# Patient Record
Sex: MALE | Race: BLACK OR AFRICAN AMERICAN | NOT HISPANIC OR LATINO | Employment: FULL TIME | URBAN - METROPOLITAN AREA
[De-identification: names, ages, dates, MRNs, and addresses within clinical notes are randomized per-mention and may not be internally consistent; named-entity substitution may affect disease eponyms.]

---

## 2017-01-09 ENCOUNTER — TELEPHONE (OUTPATIENT)
Dept: NEUROLOGY | Facility: CLINIC | Age: 42
End: 2017-01-09

## 2017-01-09 RX ORDER — CLOBAZAM 20 MG/1
TABLET ORAL
Qty: 360 TABLET | Refills: 0 | Status: SHIPPED | OUTPATIENT
Start: 2017-01-09 | End: 2017-01-12 | Stop reason: SDUPTHER

## 2017-01-09 RX ORDER — LACOSAMIDE 100 MG/1
TABLET, FILM COATED ORAL
Qty: 360 TABLET | Refills: 0 | Status: SHIPPED | OUTPATIENT
Start: 2017-01-09 | End: 2017-01-23 | Stop reason: SDUPTHER

## 2017-01-09 NOTE — TELEPHONE ENCOUNTER
----- Message from Yair Hernandez sent at 1/9/2017  9:33 AM CST -----  Contact: Self [phone]  Patient is calling to r/s the 1-24 appt, imani call

## 2017-01-13 RX ORDER — CLOBAZAM 20 MG/1
20 TABLET ORAL 2 TIMES DAILY
Qty: 360 TABLET | Refills: 0 | Status: SHIPPED | OUTPATIENT
Start: 2017-01-13 | End: 2017-01-13 | Stop reason: SDUPTHER

## 2017-01-13 NOTE — TELEPHONE ENCOUNTER
Patient is requesting a refill of his Fycompa be sent to [facility] and requests a 90 day supply.

## 2017-01-13 NOTE — TELEPHONE ENCOUNTER
----- Message from Alexandria Marley MA sent at 1/13/2017 10:31 AM CST -----  Contact: Pt [phone]      ----- Message -----     From: Jaida Andres     Sent: 1/13/2017  10:29 AM       To: Bridgett Lombardi Staff    Pt is calling to get a refill on his perampanel (FYCOMPA) 2 mg Tab and cloBAZam (ONFI) 20 mg Tab 360 tablet 0     [facility] ([facility] - [place], [place] - [address]   [address] [facility] [address]  Phone: [phone] Fax: [phone]    [facility] - [place] - [address]  [address]  [address]  [place] [address]  Phone: [phone] Fax: [phone]

## 2017-01-18 ENCOUNTER — TELEPHONE (OUTPATIENT)
Dept: NEUROLOGY | Facility: CLINIC | Age: 42
End: 2017-01-18

## 2017-01-18 RX ORDER — CLOBAZAM 20 MG/1
20 TABLET ORAL 2 TIMES DAILY
Qty: 360 TABLET | Refills: 0 | Status: SHIPPED | OUTPATIENT
Start: 2017-01-18 | End: 2017-01-23 | Stop reason: SDUPTHER

## 2017-01-23 ENCOUNTER — OFFICE VISIT (OUTPATIENT)
Dept: NEUROLOGY | Facility: CLINIC | Age: 42
End: 2017-01-23
Payer: COMMERCIAL

## 2017-01-23 VITALS
SYSTOLIC BLOOD PRESSURE: 122 MMHG | BODY MASS INDEX: 28.29 KG/M2 | HEART RATE: 84 BPM | WEIGHT: 202.81 LBS | DIASTOLIC BLOOD PRESSURE: 76 MMHG

## 2017-01-23 DIAGNOSIS — G40.219 LOCALIZATION-RELATED SYMPTOMATIC EPILEPSY AND EPILEPTIC SYNDROMES WITH COMPLEX PARTIAL SEIZURES, INTRACTABLE, WITHOUT STATUS EPILEPTICUS: ICD-10-CM

## 2017-01-23 PROCEDURE — 99999 PR PBB SHADOW E&M-EST. PATIENT-LVL II: CPT | Mod: PBBFAC,,, | Performed by: PSYCHIATRY & NEUROLOGY

## 2017-01-23 PROCEDURE — 99214 OFFICE O/P EST MOD 30 MIN: CPT | Mod: S$GLB,,, | Performed by: PSYCHIATRY & NEUROLOGY

## 2017-01-23 PROCEDURE — 1159F MED LIST DOCD IN RCRD: CPT | Mod: S$GLB,,, | Performed by: PSYCHIATRY & NEUROLOGY

## 2017-01-23 RX ORDER — LACOSAMIDE 100 MG/1
200 TABLET ORAL 2 TIMES DAILY
Qty: 360 TABLET | Refills: 1 | Status: SHIPPED | OUTPATIENT
Start: 2017-01-23 | End: 2017-03-06 | Stop reason: SDUPTHER

## 2017-01-23 RX ORDER — CLOBAZAM 20 MG/1
40 TABLET ORAL 2 TIMES DAILY
Qty: 360 TABLET | Refills: 1 | Status: SHIPPED | OUTPATIENT
Start: 2017-01-23 | End: 2017-02-22 | Stop reason: SDUPTHER

## 2017-01-23 RX ORDER — LAMOTRIGINE 200 MG/1
600 TABLET ORAL 2 TIMES DAILY
Qty: 540 TABLET | Refills: 1 | Status: SHIPPED | OUTPATIENT
Start: 2017-01-23 | End: 2017-02-21 | Stop reason: SDUPTHER

## 2017-01-23 NOTE — PROGRESS NOTES
Name: Georgette Nagel  MRN: [id number]   CSN: [id number]      Date: 01/23/2017    HISTORY OF PRESENT ILLNESS (HPI)  The patient is a 41 y.o. yo [facility]       2016/04/19   Since the last visit the patient has experienced one seizures (12 Jan 2017) .  Meds were increased last time and he is tolerating the new dosages well.  There was no obvious provocative reason.  He has a good strategy for compliance.  He had no concurrent medical problem and had not taken benadryl or other proconvulsive medication.      Results for GEORGETTE NAGEL (MRN [id number]) as of 1/23/2017 14:26   Ref. Range 4/1/2015 13:20 8/5/2015 14:05 8/21/2015 11:40   Lamotrigine Lvl Latest Ref Range: 2.0 - 15.0 ug/mL 13.4  17.3 (H)   MRI PREVIOUS Unknown  Rpt    Lacosamide Latest Ref Range: 1.0 - 10.0 mcg/mL 8.0         Current History:  pt has had the following episodes:  2015/4/8 - Pt had just finished a walk.  Eye blinking and stereotypic hand movements.  No generalized convulsions were noted. Didn't lose consciousness.  Lasted 30s to 1 minute.  2015/6/6 - Pt was in his room.  Came to his dad to give him his phone to record what was happening.  Said he has the feeling that he's about to have a seizure.  Says there is no aura per se.  Says a thought that he's about to have a seizure pops into his head, which usually precedes his events.  Said his speech got markedly worse during the event.  Eye blinking lasted 30s to 1 minute and resolved spontaneously.  Also had stereotypic hand movements and eye blinking.  Maintained consciousness throughout episode.  Pt had no memory of the seizure, which was relayed by his dad.    2015/6/8 - Pt was running around a track.  Said he overexerted himself over the last three laps.  Said he had another event immediately after exercise.  Again, this episode was brief (30s-1min) and was characterized by walking in circles, eye blinking, and stereotypic hand motions of rubbing his fingers. Had no intraictal recall.         Previous History (2015/4/1)   The patient was referred for consultation by Dr. Guerrero due to want to transfer care here due to long distance, patient is living [facility].  The patient was accompanied by his mother and father who provided some of the history.      Seizure surgery 2008 for epilepsy. Since then, less frequent and not as strong, still have seizure twice a month with current medication dosage. He is compliant with medication, tolerates medication well, no side effect noticed. On current regime for about 5 years, change to onfi 2-3 years ago in place of keppra.       Epilepsy History  ED visits: last year, feel someone talking to him  Episodes of SE  Change in meds    Seizure Seminology  Seizure Type 1  Classification:   Aura - feel it is coming, his mind told him that seizure is coming  Ictus  - Nonconv -  - Conv - whole body jerking more on the right side, after a couple of jerk, he is out, first started has tongue biting and incontinence  - Duration - 2 minutes  Post-ictal  - Symptoms: can not talk, sleepiness no headache  - Duration: 5-10 minutes  Age of onset : 6 year old  Current Seizure Frequency -  Initially once week,  Last Seizure: 10 years ago    Seizure Type 2: after seizure  Classification:   Aura - feel it is coming, but not as strong as before  Ictus  - Nonconv -  - Conv - blinking and body jerk more on the arm.   - Duration - 30 sec  Post-ictal  - Symptoms: no sleepiness  - Duration: back to baseline after ictus right away  Age of onset : 29  Current Seizure Frequency -  Last Seizure: a couple weeks ago    sz per month 2012 2013 2014 2015 2016 2017   Doug         Feb         Mar         Apr    1     May         Deny    2     Jul     1    Aug         Sep     1    Oct         Nov         Dec         Tot           Seizure Triggers:   Sleep Deprivation - None  Other medicaitons - None  Psych/stress - excitement can trigger jorge athletic event, when feel frustrated  Photic stimulation -  None  Hyperventilation - None  Medical Problems - None  Sensory Stimulation (light, sound, etc) - None  Missed dose of meds - Denies missing meds since last visit though has missed meds in the past which were closely followed by seizures.   Computer use may trigger- sometimes  Exercise/overexertion  may trigger- sometimes    AED Treatments  Present regimen  clobezam (Onfi or Frizium, CLB):  40 mg bid  lacosamide (Vimpat, LCS) :  200 mg bid  lamotrigine (Lamictal, LTG)   600 mg bid  Results for GEORGETTE MONCADA (MRN [id number]) as of 12/16/2015 14:24   Ref. Range 4/1/2015 13:20 8/5/2015 14:05 8/21/2015 11:40   Lamotrigine Lvl Latest Range: 2.0-15.0 ug/mL 13.4  17.3 (H)     Ativan PRN    Prior treatments  carbamazepine (Tegretol, CBZ):  tried in the past  ethosuximide (Zarontin, ESM):  tried in the past  felbamate (felbatol, FBM):   tried in the past  gabapentin (Neurontin, GPN):  tried in the past  levetiracetam (Keppra, LEV):   tried in the past  phenytoin (Dilantin, PHT):   tried in the past  primidone (Mysoline, PRM):   tried in the past  topiramate (Topamax, TPM):   tried in the past  valproic acid (Depakote, VPA):  tried in the past   clorazepate (Tranxene, CLZ):   Stopped previous visits.     Not tried  acetazolamide (Diamox, AZM)  amantadine  eslicarbazine (Aptiom, ESL)  methsuximide (Celontin, MSM)  methyphenytoin (Mesantion, MHT)  oxcarbazepine (Trileptal OXC)  perampanel (Fycompa, FCP)   phenobarbital (Pb)  pregabalin (Lyrica, PGB)  retigabine (Potiga, RTG)  rufinamide (Banzel, RUF)  tiagabine (Gabatril,  TGB)  viagabatrin, (Sabril, VGB)  vagal nerve stimulator (VNS)  zonisamide (Zonegran, ZNA)  Benzodiazepines  diazepam - rectal (Diastatl)  diazepam - oral (Valium, DZ)  clonazepam (Klonopin, CZP)  Brain Stimulation  Vagal Nerve Stimulation-n/a  DBS- n/a    Compliance method  Memory - yes  Mom or Spouse - Yes  Pill Box - no  Henrique calendar - no  Turn over medication bottle - no  Phone alarm - no    Seizure  Evaluation  EEG Routine -   EEG Ambulatory -   EEG\Video Monitoring - 2014  MRI/MRA - 2015  CT/CTA Scan -   PET Scan -   Neuropsychological evaluation -   DEXA Scan  Had continuous EEG in 2014 no find anything abnormal.  MRI brain 2015: no new changes.     Potential Epilepsy Risk Factors:   Pregnancy/Labor/Delivery - full term uncomplicated pregnancy labor and vaginal delivery  Febrile seizures - none  Head injury  - none  CNS infection - none     Stroke - none  Family Hx of Sz - none    PAST MEDICAL HISTORY:   Epilepsy     PAST SURGICAL HISTORY: epilepsy surgery 2008    FAMILY HISTORY:   Family History   Problem Relation Age of Onset    Diabetes Father     Cancer Father     Diabetes Sister     Cancer Paternal Aunt     Diabetes Paternal Uncle     Cancer Paternal Grandmother     Diabetes Paternal Grandfather     Cancer Paternal Grandfather            SOCIAL HISTORY:   Social History     Social History    Marital status: Single     Spouse name: N/A    Number of children: N/A    Years of education: N/A     Occupational History    Not on file.     Social History Main Topics    Smoking status: Never Smoker    Smokeless tobacco: Not on file    Alcohol use No    Drug use: No    Sexual activity: Not on file     Other Topics Concern    Not on file     Social History Narrative        SUBSTANCE USE:  Social History     Social History Main Topics    Smoking status: Never Smoker    Smokeless tobacco: Not on file    Alcohol use No    Drug use: No    Sexual activity: Not on file      Social History   Substance Use Topics    Smoking status: Never Smoker    Smokeless tobacco: Not on file    Alcohol use No        ALLERGIES: Review of patient's allergies indicates no known allergies.     Visit Vitals    /76    Pulse 84    Wt 92 kg (202 lb 13.2 oz)    BMI 28.29 kg/m2       Higher Cortical Function:    Patient is a well developed, pleasant, well groomed individual appearing their stated age  Oriented  "- intact to person, place and time and followed two step instruction correctly.    Spell WORLD - Patients response: forward - WORLD; backwards -   Subtraction of serial 7s from 100 - 3 steps performed correct  Memory - Patient recalled 3 of 3 objects after 5 minutes  Fund of knowledge was appropriate.    R-L Orientation - Intact - Impaired  Language - Speech showed impaired verbal fluency and expression.  Some perseveration of speech.  Agnosias, agraphesthesia, or astereognosis - not present.   Extinction with double simultaneous stimulation:        Proximal-distal stimulation - Not present        Right-left stimulation - Not present  Cranial Nerves II - XII:    EOMs were intact with normal smooth and a mild end gaze nystagmus.    PERRLA. D/C   Funduscopic exam - disc were flat with normal A/V ratio and no exudates or hemorrhages. Visual fields were full to confrontation.    Motor - facial movement was symmetrical and normal.    Facial sensory - Light touch and pin prick sensations were normal.    Hearing was normal to finger rub.  Palate moved well and was symmetrical with normal palatal and oral sensation.    Tongue movement was full & the patient could say "la la la" and "Ka Ka Ka" without  difficulty. Patient repeated Congregation and Pentecostal without difficulty. Normal power and bulk was found in the massiter and rotator muscles of the neck.  Motor: Power, bulk and tone were normal in all extremities.  Sensory: Light touch, pin prick, vibration and position senses were normal in all extremities.    Coordination:       Rapid alternating movements and rapid finger tapping - normal.       Finger to nose - nl.       Arm roll - symmetrical.    Gait:  Station, gait and tandem walking were done without difficulty and Romberg was negative.  Frontal release signs  Sign Left Right   Glabellar Mild    Snout absent absent   Root absent absent   Suck absent absent   Palmomental absent absent          Deep tendon reflexes:  "   Reflex L R   Bicpets 2+ 2+   Tricepts 2+ 2+   Brachio-radialis 2+ 2+   Knee 2+ 2+   Ankle 2+ 2+   Babinski No No     Tremor: resting, postural, intentional - none    Pulses    Carotids - strong without bruits    Peripheral - strong and symmetrical      IMPRESSION  1. Complex partial  2. GTC  3. Post Left frontal lobe resection  4. He has infrequent seizures and improved with last dosage change    DISPOSITION:   Obtain records from Dr Rawls at [facility]  2, Continue clobazam 80 mg per day   3. Continue lacosamide (Vimpat, LCS) : 200 mg 1 BID  4. Continue lamotrigine (Lamictal, LTG) 600 mg BID (3 tab BID)   5.  Continue Perampanel 2 mg but increase to 3 tabs QD   6.  RTC in 6 months

## 2017-01-23 NOTE — LETTER
January 23, 2017      Tad Tillman MD  [address]  [facility] [place] [address]           [facility]  [address]  [facility] [address]  Phone: [phone]  Fax: [phone]          Patient: Nicholas Nagel   MR Number: [id number]   YOB: 1975   Date of Visit: 1/23/2017       Dear Dr. Tad Tillman:    Thank you for referring Nicholas Nagel to me for evaluation. Attached you will find relevant portions of my assessment and plan of care.    If you have questions, please do not hesitate to call me. I look forward to following Nicholas Nagel along with you.    Sincerely,    MARIANA Urias MD    Enclosure  CC:  No Recipients    If you would like to receive this communication electronically, please contact [email or web address] or [phone] to request more information on [employer] Link access.    For providers and/or their staff who would like to refer a patient to [facility], please contact us through our one-stop-shop provider referral line, [facility], at [phone].    If you feel you have received this communication in error or would no longer like to receive these types of communications, please e-mail [email or web address]

## 2017-01-31 DIAGNOSIS — G40.219 LOCALIZATION-RELATED EPILEPSY WITH COMPLEX PARTIAL SEIZURES WITH INTRACTABLE EPILEPSY: ICD-10-CM

## 2017-02-02 ENCOUNTER — TELEPHONE (OUTPATIENT)
Dept: NEUROLOGY | Facility: CLINIC | Age: 42
End: 2017-02-02

## 2017-02-02 RX ORDER — LORAZEPAM 2 MG/1
TABLET ORAL
Qty: 10 TABLET | Refills: 0 | Status: SHIPPED | OUTPATIENT
Start: 2017-02-02 | End: 2017-06-27 | Stop reason: SDUPTHER

## 2017-02-02 NOTE — TELEPHONE ENCOUNTER
Called in a refill of Patient's Ativan to [facility] per order of Dr Urias.

## 2017-02-14 ENCOUNTER — TELEPHONE (OUTPATIENT)
Dept: NEUROLOGY | Facility: CLINIC | Age: 42
End: 2017-02-14

## 2017-02-14 NOTE — TELEPHONE ENCOUNTER
Prior Auth sent by [employer] that Patient's Onfi has been approved from 2/14/17-2/14/2018.

## 2017-02-15 NOTE — TELEPHONE ENCOUNTER
----- Message from Marilee Calloway sent at 2/15/2017 11:56 AM CST -----  Contact: [employer]  Rylie called wondering if we have gotten authorization from [facility] for this patient.    [phone]

## 2017-02-15 NOTE — TELEPHONE ENCOUNTER
Called and spoke to [facility] support.Letter from [employer] faxed to them.

## 2017-02-21 DIAGNOSIS — G40.219 LOCALIZATION-RELATED SYMPTOMATIC EPILEPSY AND EPILEPTIC SYNDROMES WITH COMPLEX PARTIAL SEIZURES, INTRACTABLE, WITHOUT STATUS EPILEPTICUS: ICD-10-CM

## 2017-02-22 RX ORDER — LAMOTRIGINE 200 MG/1
TABLET ORAL
Qty: 540 TABLET | Refills: 0 | Status: SHIPPED | OUTPATIENT
Start: 2017-02-22 | End: 2017-05-23 | Stop reason: SDUPTHER

## 2017-02-22 NOTE — TELEPHONE ENCOUNTER
Patient's father called back.He is speaking of Onfi,not Lamictal.He is requesting Onfi be sent to [facility] for Patient.

## 2017-02-22 NOTE — TELEPHONE ENCOUNTER
Returned Father's call.Informed him that Dr Urias has sent a refill with 540 tablets to [facility].He has Nurse's direct line to call if there is any problems.

## 2017-02-23 ENCOUNTER — TELEPHONE (OUTPATIENT)
Dept: NEUROLOGY | Facility: CLINIC | Age: 42
End: 2017-02-23

## 2017-02-23 RX ORDER — CLOBAZAM 20 MG/1
40 TABLET ORAL 2 TIMES DAILY
Qty: 360 TABLET | Refills: 1 | Status: SHIPPED | OUTPATIENT
Start: 2017-02-23 | End: 2017-07-07 | Stop reason: SDUPTHER

## 2017-03-09 RX ORDER — LACOSAMIDE 100 MG/1
TABLET, FILM COATED ORAL
Qty: 360 TABLET | Refills: 0 | Status: SHIPPED | OUTPATIENT
Start: 2017-03-09 | End: 2017-05-23 | Stop reason: SDUPTHER

## 2017-03-22 ENCOUNTER — TELEPHONE (OUTPATIENT)
Dept: NEUROLOGY | Facility: CLINIC | Age: 42
End: 2017-03-22

## 2017-03-29 ENCOUNTER — TELEPHONE (OUTPATIENT)
Dept: NEUROLOGY | Facility: CLINIC | Age: 42
End: 2017-03-29

## 2017-03-29 NOTE — TELEPHONE ENCOUNTER
After receiving refill request for ativan from pharmacy, I discussed this w/Dr. Clinton. He is very wary of reordering the ativan. Mr. Nagel' father states the patient rarely takes the medicine...only before planned exciting functions because they feel like the extra excitement will cause a seizure. He agreed to 0.5 mg po prn w/10 tablets only for 2 months. Mr. Nagel' father understands and agrees to call if needed. Prescription was faxed as above.

## 2017-05-23 DIAGNOSIS — G40.219 LOCALIZATION-RELATED SYMPTOMATIC EPILEPSY AND EPILEPTIC SYNDROMES WITH COMPLEX PARTIAL SEIZURES, INTRACTABLE, WITHOUT STATUS EPILEPTICUS: ICD-10-CM

## 2017-05-23 RX ORDER — LAMOTRIGINE 200 MG/1
TABLET ORAL
Qty: 540 TABLET | Refills: 0 | Status: SHIPPED | OUTPATIENT
Start: 2017-05-23 | End: 2018-01-26 | Stop reason: SDUPTHER

## 2017-05-23 RX ORDER — LACOSAMIDE 100 MG/1
TABLET, FILM COATED ORAL
Qty: 360 TABLET | Refills: 0 | Status: SHIPPED | OUTPATIENT
Start: 2017-05-23 | End: 2017-06-01 | Stop reason: SDUPTHER

## 2017-05-26 ENCOUNTER — TELEPHONE (OUTPATIENT)
Dept: NEUROLOGY | Facility: CLINIC | Age: 42
End: 2017-05-26

## 2017-06-01 RX ORDER — LACOSAMIDE 100 MG/1
1 TABLET ORAL 2 TIMES DAILY
Qty: 360 TABLET | Refills: 0 | Status: SHIPPED | OUTPATIENT
Start: 2017-06-01 | End: 2017-07-13 | Stop reason: SDUPTHER

## 2017-06-01 RX ORDER — LACOSAMIDE 100 MG/1
1 TABLET ORAL 2 TIMES DAILY
Qty: 360 TABLET | Refills: 0 | Status: CANCELLED | OUTPATIENT
Start: 2017-06-01

## 2017-06-06 ENCOUNTER — TELEPHONE (OUTPATIENT)
Dept: NEUROLOGY | Facility: CLINIC | Age: 42
End: 2017-06-06

## 2017-06-06 NOTE — TELEPHONE ENCOUNTER
----- Message from Jaida Andres sent at 6/6/2017 10:02 AM CDT -----  Contact: Patient [phone]  Patient states that the pharmacy below has sent over several request for a refill on his lacosamide (VIMPAT) 100 mg Tab, patient is requesting a call back to schedule his July appt. Please call patient         [facility] - [place] - [address]  [address]  [address]  [place] [address]  Phone: [phone] Fax: [phone]

## 2017-06-06 NOTE — TELEPHONE ENCOUNTER
Called in Vimpat 200 mg by mouth twice daily per order of Dr Urias.Informed Patient's father of above and that he would be receiving the 200 mg tablets.

## 2017-06-27 ENCOUNTER — TELEPHONE (OUTPATIENT)
Dept: NEUROLOGY | Facility: CLINIC | Age: 42
End: 2017-06-27

## 2017-06-27 DIAGNOSIS — G40.219 LOCALIZATION-RELATED EPILEPSY WITH COMPLEX PARTIAL SEIZURES WITH INTRACTABLE EPILEPSY: ICD-10-CM

## 2017-06-27 NOTE — TELEPHONE ENCOUNTER
called in per Dr. Urias the ativan 0.5mg one tab prn for 10 total. he is not to have refill for 3 months per Dr. Urias

## 2017-06-28 RX ORDER — LORAZEPAM 2 MG/1
2 TABLET ORAL DAILY PRN
Qty: 10 TABLET | Refills: 0 | Status: SHIPPED | OUTPATIENT
Start: 2017-06-28 | End: 2018-01-26

## 2017-07-10 RX ORDER — CLOBAZAM 20 MG/1
TABLET ORAL
Qty: 360 TABLET | Refills: 0 | Status: SHIPPED | OUTPATIENT
Start: 2017-07-10 | End: 2017-07-13 | Stop reason: SDUPTHER

## 2017-07-13 ENCOUNTER — OFFICE VISIT (OUTPATIENT)
Dept: NEUROLOGY | Facility: CLINIC | Age: 42
End: 2017-07-13
Payer: COMMERCIAL

## 2017-07-13 VITALS
HEIGHT: 71 IN | HEART RATE: 91 BPM | SYSTOLIC BLOOD PRESSURE: 129 MMHG | WEIGHT: 205.5 LBS | DIASTOLIC BLOOD PRESSURE: 83 MMHG | BODY MASS INDEX: 28.77 KG/M2

## 2017-07-13 DIAGNOSIS — F07.0 TEMPORAL LOBECTOMY BEHAVIOR SYNDROME: Primary | ICD-10-CM

## 2017-07-13 DIAGNOSIS — G40.219 LOCALIZATION-RELATED SYMPTOMATIC EPILEPSY AND EPILEPTIC SYNDROMES WITH COMPLEX PARTIAL SEIZURES, INTRACTABLE, WITHOUT STATUS EPILEPTICUS: ICD-10-CM

## 2017-07-13 PROCEDURE — 99214 OFFICE O/P EST MOD 30 MIN: CPT | Mod: S$GLB,,, | Performed by: PSYCHIATRY & NEUROLOGY

## 2017-07-13 PROCEDURE — 99999 PR PBB SHADOW E&M-EST. PATIENT-LVL III: CPT | Mod: PBBFAC,,, | Performed by: PSYCHIATRY & NEUROLOGY

## 2017-07-13 RX ORDER — CLOBAZAM 20 MG/1
20 TABLET ORAL 2 TIMES DAILY
Qty: 360 TABLET | Refills: 1 | Status: SHIPPED | OUTPATIENT
Start: 2017-07-13 | End: 2018-01-26 | Stop reason: SDUPTHER

## 2017-07-13 RX ORDER — LORAZEPAM 1 MG/1
1 TABLET ORAL EVERY 12 HOURS PRN
Qty: 15 TABLET | Refills: 1 | Status: SHIPPED | OUTPATIENT
Start: 2017-07-13 | End: 2017-09-27 | Stop reason: SDUPTHER

## 2017-07-13 RX ORDER — LACOSAMIDE 200 MG/1
200 TABLET, FILM COATED ORAL 2 TIMES DAILY
Qty: 180 TABLET | Refills: 1 | Status: SHIPPED | OUTPATIENT
Start: 2017-07-13 | End: 2018-01-26 | Stop reason: SDUPTHER

## 2017-07-13 RX ORDER — LACOSAMIDE 100 MG/1
1 TABLET ORAL 2 TIMES DAILY
Qty: 360 TABLET | Refills: 0 | Status: SHIPPED | OUTPATIENT
Start: 2017-07-13 | End: 2018-01-26

## 2017-07-13 RX ORDER — LORAZEPAM 0.5 MG/1
TABLET ORAL
Refills: 0 | COMMUNITY
Start: 2017-06-27 | End: 2017-07-13 | Stop reason: SDUPTHER

## 2017-07-13 RX ORDER — LACOSAMIDE 200 MG/1
TABLET, FILM COATED ORAL
Refills: 0 | COMMUNITY
Start: 2017-06-06 | End: 2017-07-13 | Stop reason: SDUPTHER

## 2017-07-13 RX ORDER — TERBINAFINE HYDROCHLORIDE 250 MG/1
TABLET ORAL
Refills: 0 | COMMUNITY
Start: 2017-04-25 | End: 2018-01-26

## 2017-07-13 NOTE — LETTER
July 13, 2017      Tad Tillman MD  [address] [facility] [place] [address]           [facility] - Neurology Epilepsy  [address]  [facility] [address]  Phone: [phone]  Fax: [phone]          Patient: Nicholas Nagel   MR Number: [id number]   YOB: 1975   Date of Visit: 7/13/2017       Dear Dr. Tad Tillman:    Thank you for referring Nicholas Nagel to me for evaluation. Attached you will find relevant portions of my assessment and plan of care.    If you have questions, please do not hesitate to call me. I look forward to following Nicholas Nagel along with you.    Sincerely,    MARIANA Urias MD    Enclosure  CC:  No Recipients    If you would like to receive this communication electronically, please contact [email or web address] or [phone] to request more information on [employer] Link access.    For providers and/or their staff who would like to refer a patient to [facility], please contact us through our one-stop-shop provider referral line, [facility], at [phone].    If you feel you have received this communication in error or would no longer like to receive these types of communications, please e-mail [email or web address]

## 2017-07-13 NOTE — PROGRESS NOTES
Name: Georgette Nagel  MRN: [id number]   CSN: [id number]      Date: 07/13/2017    HISTORY OF PRESENT ILLNESS (HPI)  [facility] 41 [facility]     2017/07/13  In the past 6 months the patient has experienced five seizures (about 1 per month).   The patient was having difficulty expressing himself - speech is halting and disconnected.  He has been taking ativan prior to social event or watching sports to prevent seizure.  He has been taking ativan about 2 times a month.      2016/04/19 and 2017/01/23   Since the last visit the patient has experienced one seizures (12 Jan 2017) .  Meds were increased last time and he is tolerating the new dosages well.  There was no obvious provocative reason.  He has a good strategy for compliance.  He had no concurrent medical problem and had not taken benadryl or other proconvulsive medication.      Results for GEORGETTE NAGEL (MRN [id number]) as of 1/23/2017 14:26   Ref. Range 4/1/2015 13:20 8/5/2015 14:05 8/21/2015 11:40   Lamotrigine Lvl Latest Ref Range: 2.0 - 15.0 ug/mL 13.4  17.3 (H)   MRI PREVIOUS Unknown  Rpt    Lacosamide Latest Ref Range: 1.0 - 10.0 mcg/mL 8.0           pt has had the following episodes:  2015/4/8 - Pt had just finished a walk.  Eye blinking and stereotypic hand movements.  No generalized convulsions were noted. Didn't lose consciousness.  Lasted 30s to 1 minute.  2015/6/6 - Pt was in his room.  Came to his dad to give him his phone to record what was happening.  Said he has the feeling that he's about to have a seizure.  Says there is no aura per se.  Says a thought that he's about to have a seizure pops into his head, which usually precedes his events.  Said his speech got markedly worse during the event.  Eye blinking lasted 30s to 1 minute and resolved spontaneously.  Also had stereotypic hand movements and eye blinking.  Maintained consciousness throughout episode.  Pt had no memory of the seizure, which was relayed by his  dad.    2015/6/8 - Pt was running around a track.  Said he overexerted himself over the last three laps.  Said he had another event immediately after exercise.  Again, this episode was brief (30s-1min) and was characterized by walking in circles, eye blinking, and stereotypic hand motions of rubbing his fingers. Had no intraictal recall.      Previous History (2015/4/1)   The patient was referred for consultation by Dr. Guerrero due to want to transfer care here due to long distance[facility]  The patient was accompanied by his mother and father who provided some of the history.      Seizure surgery 2008 for epilepsy. Since then, less frequent and not as strong, still have seizure twice a month with current medication dosage. He is compliant with medication, tolerates medication well, no side effect noticed. On current regime for about 5 years, change to onfi 2-3 years ago in place of keppra.       Epilepsy History  ED visits: last year, feel someone talking to him  Episodes of SE  Change in meds    Seizure Seminology  Seizure Type 1  Classification:   Aura - feel it is coming, his mind told him that seizure is coming  Ictus  - Nonconv -  - Conv - whole body jerking more on the right side, after a couple of jerk, he is out, first started has tongue biting and incontinence  - Duration - 2 minutes  Post-ictal  - Symptoms: can not talk, sleepiness no headache  - Duration: 5-10 minutes  Age of onset : 6 year old  Current Seizure Frequency -  Initially once week,  Last Seizure: 10 years ago    Seizure Type 2: after seizure  Classification:   Aura - feel it is coming, but not as strong as before  Ictus  - Nonconv -  - Conv - blinking and body jerk more on the arm.   - Duration - 30 sec  Post-ictal  - Symptoms: no sleepiness  - Duration: back to baseline after ictus right away  Age of onset : 29  Current Seizure Frequency -  Last Seizure: a couple weeks ago    sz per month 2012 2013 2014 2015 2016 2017    Doug         Feb         Mar         Apr    1     May         Deny    2     Jul     1    Aug         Sep     1    Oct         Nov         Dec         Tot           Seizure Triggers:   Sleep Deprivation - None  Other medicaitons - None  Psych/stress - excitement can trigger jorge athletic event, when feel frustrated  Photic stimulation - None  Hyperventilation - None  Medical Problems - None  Sensory Stimulation (light, sound, etc) - None  Missed dose of meds - Denies missing meds since last visit though has missed meds in the past which were closely followed by seizures.   Computer use may trigger- sometimes  Exercise/overexertion  may trigger- sometimes    AED Treatments  Present regimen  clobezam (Onfi or Frizium, CLB):  40 mg bid  lacosamide (Vimpat, LCS) :  200 mg bid  lamotrigine (Lamictal, LTG)   600 mg bid    Results for GEORGETTE MONCADA (MRN [id number]) as of 12/16/2015 14:24   Ref. Range 4/1/2015 13:20 8/5/2015 14:05 8/21/2015 11:40   Lamotrigine Lvl Latest Range: 2.0-15.0 ug/mL 13.4  17.3 (H)     Ativan PRN    Prior treatments  carbamazepine (Tegretol, CBZ):  tried in the past  ethosuximide (Zarontin, ESM):  tried in the past  felbamate (felbatol, FBM):   tried in the past  gabapentin (Neurontin, GPN):  tried in the past  levetiracetam (Keppra, LEV):   tried in the past  phenytoin (Dilantin, PHT):   tried in the past  primidone (Mysoline, PRM):   tried in the past  topiramate (Topamax, TPM):   tried in the past  valproic acid (Depakote, VPA):  tried in the past   clorazepate (Tranxene, CLZ):   Stopped previous visits.     Not tried  acetazolamide (Diamox, AZM)  amantadine  eslicarbazine (Aptiom, ESL)  methsuximide (Celontin, MSM)  methyphenytoin (Mesantion, MHT)  oxcarbazepine (Trileptal OXC)  perampanel (Fycompa, FCP)   phenobarbital (Pb)  pregabalin (Lyrica, PGB)  retigabine (Potiga, RTG)  rufinamide (Banzel, RUF)  tiagabine (Gabatril,  TGB)  viagabatrin, (Sabril, VGB)  vagal nerve stimulator  (VNS)  zonisamide (Zonegran, ZNA)  Benzodiazepines  diazepam - rectal (Diastatl)  diazepam - oral (Valium, DZ)  clonazepam (Klonopin, CZP)  Brain Stimulation  Vagal Nerve Stimulation-n/a  DBS- n/a    Compliance method  Memory - yes  Mom or Spouse - Yes  Pill Box - no  Henrique calendar - no  Turn over medication bottle - no  Phone alarm - no    Seizure Evaluation  EEG Routine -   EEG Ambulatory -   EEG\Video Monitoring - 2014  MRI/MRA - 2015  CT/CTA Scan -   PET Scan -   Neuropsychological evaluation -   DEXA Scan  Had continuous EEG in 2014 no find anything abnormal.  MRI brain 2015: no new changes.     Potential Epilepsy Risk Factors:   Pregnancy/Labor/Delivery - full term uncomplicated pregnancy labor and vaginal delivery  Febrile seizures - none  Head injury  - none  CNS infection - none     Stroke - none  Family Hx of Sz - none    PAST MEDICAL HISTORY:   Epilepsy     PAST SURGICAL HISTORY: epilepsy surgery 2008    FAMILY HISTORY:   Family History   Problem Relation Age of Onset    Diabetes Father     Cancer Father     Diabetes Sister     Cancer Paternal Aunt     Diabetes Paternal Uncle     Cancer Paternal Grandmother     Diabetes Paternal Grandfather     Cancer Paternal Grandfather            SOCIAL HISTORY:   Social History     Social History    Marital status: Single     Spouse name: N/A    Number of children: N/A    Years of education: N/A     Occupational History    Not on file.     Social History Main Topics    Smoking status: Never Smoker    Smokeless tobacco: Not on file    Alcohol use No    Drug use: No    Sexual activity: Not on file     Other Topics Concern    Not on file     Social History Narrative    No narrative on file        SUBSTANCE USE:  Social History     Social History Main Topics    Smoking status: Never Smoker    Smokeless tobacco: Not on file    Alcohol use No    Drug use: No    Sexual activity: Not on file      Social History   Substance Use Topics    Smoking  "status: Never Smoker    Smokeless tobacco: Not on file    Alcohol use No        ALLERGIES: Review of patient's allergies indicates no known allergies.     /83   Pulse 91   Ht 5' 11" (1.803 m)   Wt 93.2 kg (205 lb 7.5 oz)   BMI 28.66 kg/m²     Higher Cortical Function:    Patient is a well developed, pleasant, well groomed individual appearing their stated age  Oriented - intact to person, place and time and followed two step instruction correctly.    Spell WORLD - Patients response: forward - WORLD; backwards -   Subtraction of serial 7s from 100 - 3 steps performed correct  Memory - Patient recalled 3 of 3 objects after 5 minutes  Fund of knowledge was appropriate.    R-L Orientation - Intact - Impaired  Language - Speech showed impaired verbal fluency and expression.  Some perseveration of speech.  Agnosias, agraphesthesia, or astereognosis - not present.   Extinction with double simultaneous stimulation:        Proximal-distal stimulation - Not present        Right-left stimulation - Not present  Cranial Nerves II - XII:    EOMs were intact with normal smooth and a mild end gaze nystagmus.    PERRLA. D/C   Funduscopic exam - disc were flat with normal A/V ratio and no exudates or hemorrhages. Visual fields were full to confrontation.    Motor - facial movement was symmetrical and normal.    Facial sensory - Light touch and pin prick sensations were normal.    Hearing was normal to finger rub.  Palate moved well and was symmetrical with normal palatal and oral sensation.    Tongue movement was full & the patient could say "la la la" and "Ka Ka Ka" without  difficulty. Patient repeated Anabaptism and Jewish without difficulty. Normal power and bulk was found in the massiter and rotator muscles of the neck.  Motor: Power, bulk and tone were normal in all extremities.  Sensory: Light touch, pin prick, vibration and position senses were normal in all extremities.    Coordination:       Rapid alternating " movements and rapid finger tapping - normal.       Finger to nose - nl.       Arm roll - symmetrical.    Gait:  Station, gait and tandem walking were done without difficulty and Romberg was negative.  Frontal release signs  Sign Left Right   Glabellar Mild    Snout absent absent   Root absent absent   Suck absent absent   Palmomental absent absent          Deep tendon reflexes:    Reflex L R   Bicpets 2+ 2+   Tricepts 2+ 2+   Brachio-radialis 2+ 2+   Knee 2+ 2+   Ankle 2+ 2+   Babinski No No     Tremor: resting, postural, intentional - none    Pulses    Carotids - strong without bruits    Peripheral - strong and symmetrical      IMPRESSION  1. Complex partial  2. GTC  3. Post Left frontal lobe resection  4. He has infrequent seizures and improved with last dosage change    DISPOSITION:   1. Continue clobazam 80 mg per day   2. Continue lacosamide (Vimpat, LCS) : 200 mg 1 BID  3. Continue lamotrigine (Lamictal, LTG) 600 mg BID (3 tab BID)   4.  Continue Perampanel 2 mg but increase to 3 tabs QD   5. Blood levels of clobazam, lacosamide, lamictal and perampanel today - doses may be adjusted based on the blood level results.  6. I discussed VNS with patient and his Dad.  Literature was provided to patient.  We will discuss this further at the next visit  6.  RTC in 2-3 months

## 2017-09-27 NOTE — TELEPHONE ENCOUNTER
Patient's father called requesting a refill on Patient's Ativan.He states that the prescription was not given to him on last visit-and Patient uses medication as needed.Please advise.

## 2017-10-09 ENCOUNTER — TELEPHONE (OUTPATIENT)
Dept: NEUROLOGY | Facility: CLINIC | Age: 42
End: 2017-10-09

## 2017-10-09 RX ORDER — LORAZEPAM 1 MG/1
1 TABLET ORAL EVERY 12 HOURS PRN
Qty: 15 TABLET | Refills: 0 | Status: SHIPPED | OUTPATIENT
Start: 2017-10-09 | End: 2019-08-29 | Stop reason: SDUPTHER

## 2017-11-27 DIAGNOSIS — G40.219 LOCALIZATION-RELATED SYMPTOMATIC EPILEPSY AND EPILEPTIC SYNDROMES WITH COMPLEX PARTIAL SEIZURES, INTRACTABLE, WITHOUT STATUS EPILEPTICUS: ICD-10-CM

## 2017-11-27 RX ORDER — LACOSAMIDE 100 MG/1
1 TABLET ORAL 2 TIMES DAILY
Qty: 360 TABLET | Refills: 0 | Status: CANCELLED | OUTPATIENT
Start: 2017-11-27

## 2017-11-27 RX ORDER — CLOBAZAM 20 MG/1
20 TABLET ORAL 2 TIMES DAILY
Qty: 360 TABLET | Refills: 1 | Status: CANCELLED | OUTPATIENT
Start: 2017-11-27

## 2017-11-27 NOTE — TELEPHONE ENCOUNTER
Spoke to Harshad with [facility] and refilld Onfi 80mg and Vimpat 200mg as requested. 3 month supply with 1 additional refill per Dr Urias.

## 2017-11-27 NOTE — TELEPHONE ENCOUNTER
----- Message from Kenji Workman sent at 11/27/2017  9:50 AM CST -----  Contact: Self @ [phone]  Pt is asking for refill on VIMPAT 200 mg Tab and cloBAZam (ONFI) 20 mg Tab    [facility] - [place] - [address] AT [facility]  [address]  [facility] [address]  Phone: [phone] Fax: [phone] (PT SAID PHONE NUMBER  [phone])

## 2017-12-06 RX ORDER — LACOSAMIDE 200 MG/1
200 TABLET, FILM COATED ORAL 2 TIMES DAILY
Qty: 180 TABLET | Refills: 1 | Status: CANCELLED | OUTPATIENT
Start: 2017-12-06

## 2017-12-06 NOTE — TELEPHONE ENCOUNTER
Called in Vimpat per clinic notes of Dr Urias to [facility].Informed father.

## 2017-12-11 ENCOUNTER — TELEPHONE (OUTPATIENT)
Dept: NEUROLOGY | Facility: CLINIC | Age: 42
End: 2017-12-11

## 2017-12-11 NOTE — TELEPHONE ENCOUNTER
Spoke to Patient's father and informed him that there is no contra-indication that Patient should not take the flu shot.As always,instructed him to notify clinic for any questions,needs,or concerns.

## 2018-01-17 RX ORDER — PERAMPANEL 2 MG/1
TABLET ORAL
Qty: 90 TABLET | Refills: 0 | Status: SHIPPED | OUTPATIENT
Start: 2018-01-17 | End: 2018-01-18 | Stop reason: SDUPTHER

## 2018-01-19 RX ORDER — PERAMPANEL 2 MG/1
TABLET ORAL
Qty: 90 TABLET | Refills: 0 | Status: SHIPPED | OUTPATIENT
Start: 2018-01-19 | End: 2018-01-26 | Stop reason: SDUPTHER

## 2018-01-26 ENCOUNTER — OFFICE VISIT (OUTPATIENT)
Dept: NEUROLOGY | Facility: CLINIC | Age: 43
End: 2018-01-26
Payer: COMMERCIAL

## 2018-01-26 VITALS
DIASTOLIC BLOOD PRESSURE: 69 MMHG | HEIGHT: 71 IN | SYSTOLIC BLOOD PRESSURE: 111 MMHG | HEART RATE: 72 BPM | BODY MASS INDEX: 28.95 KG/M2 | WEIGHT: 206.81 LBS

## 2018-01-26 DIAGNOSIS — F07.0 TEMPORAL LOBECTOMY BEHAVIOR SYNDROME: Primary | ICD-10-CM

## 2018-01-26 DIAGNOSIS — G40.219 LOCALIZATION-RELATED SYMPTOMATIC EPILEPSY AND EPILEPTIC SYNDROMES WITH COMPLEX PARTIAL SEIZURES, INTRACTABLE, WITHOUT STATUS EPILEPTICUS: ICD-10-CM

## 2018-01-26 PROCEDURE — 99214 OFFICE O/P EST MOD 30 MIN: CPT | Mod: S$GLB,,, | Performed by: PSYCHIATRY & NEUROLOGY

## 2018-01-26 PROCEDURE — 99999 PR PBB SHADOW E&M-EST. PATIENT-LVL II: CPT | Mod: PBBFAC,,, | Performed by: PSYCHIATRY & NEUROLOGY

## 2018-01-26 RX ORDER — CLOBAZAM 20 MG/1
40 TABLET ORAL 2 TIMES DAILY
Qty: 360 TABLET | Refills: 2 | Status: SHIPPED | OUTPATIENT
Start: 2018-01-26 | End: 2018-09-13 | Stop reason: SDUPTHER

## 2018-01-26 RX ORDER — LACOSAMIDE 200 MG/1
300 TABLET, FILM COATED ORAL 2 TIMES DAILY
Qty: 270 TABLET | Refills: 2 | Status: SHIPPED | OUTPATIENT
Start: 2018-01-26 | End: 2018-02-26 | Stop reason: SDUPTHER

## 2018-01-26 RX ORDER — LAMOTRIGINE 200 MG/1
300 TABLET ORAL 2 TIMES DAILY
Qty: 270 TABLET | Refills: 3 | Status: SHIPPED | OUTPATIENT
Start: 2018-01-26 | End: 2018-09-13 | Stop reason: SDUPTHER

## 2018-01-26 NOTE — LETTER
January 26, 2018      Tad Tillman MD  [address] [facility] [address] [place] [address]           [facility] - Neurology Epilepsy  [address]  [facility] [address]  Phone: [phone]  Fax: [phone]          Patient: Nicholas Nagel   MR Number: [id number]   YOB: 1975   Date of Visit: 1/26/2018       Dear Dr. Tad Tillman:    Thank you for referring Nicholas Nagel to me for evaluation. Attached you will find relevant portions of my assessment and plan of care.    If you have questions, please do not hesitate to call me. I look forward to following Nicholas Nagel along with you.    Sincerely,    MARIANA Urias MD    Enclosure  CC:  No Recipients    If you would like to receive this communication electronically, please contact [email or web address] or [phone] to request more information on [facility] access.    For providers and/or their staff who would like to refer a patient to [facility], please contact us through our one-stop-shop provider referral line, [facility], at [phone].    If you feel you have received this communication in error or would no longer like to receive these types of communications, please e-mail [email or web address]

## 2018-01-26 NOTE — PROGRESS NOTES
Name: Georgette Nagel  MRN: [id number]   CSN: [id number]      Date: 01/26/2018    HISTORY OF PRESENT ILLNESS (HPI)  The [facility] 42 [facility]   2018/01/26  The patient reports 3 sz since the last visit.  The most recent may have resulted from missed dose.  No triggers evident for the other 2 sz.  The patient reports he has an aura but can not describe what he perceives.      2017/07/13  In the past 6 months the patient has experienced five seizures (about 1 per month).   The patient was having difficulty expressing himself - speech is halting and disconnected.  He has been taking ativan prior to social event or watching sports to prevent seizure.  He has been taking ativan about 2 times a month.      2016/04/19 and 2017/01/23   Since the last visit the patient has experienced one seizures (12 Jan 2017) .  Meds were increased last time and he is tolerating the new dosages well.  There was no obvious provocative reason.  He has a good strategy for compliance.  He had no concurrent medical problem and had not taken benadryl or other proconvulsive medication.      Results for GEORGETTE NAGEL (MRN [id number]) as of 1/23/2017 14:26   Ref. Range 4/1/2015 13:20 8/5/2015 14:05 8/21/2015 11:40   Lamotrigine Lvl Latest Ref Range: 2.0 - 15.0 ug/mL 13.4  17.3 (H)   MRI PREVIOUS Unknown  Rpt    Lacosamide Latest Ref Range: 1.0 - 10.0 mcg/mL 8.0           pt has had the following episodes:  2015/4/8 - Pt had just finished a walk.  Eye blinking and stereotypic hand movements.  No generalized convulsions were noted. Didn't lose consciousness.  Lasted 30s to 1 minute.  2015/6/6 - Pt was in his room.  Came to his dad to give him his phone to record what was happening.  Said he has the feeling that he's about to have a seizure.  Says there is no aura per se.  Says a thought that he's about to have a seizure pops into his head, which usually precedes his events.  Said his speech got markedly worse during the event.  Eye  blinking lasted 30s to 1 minute and resolved spontaneously.  Also had stereotypic hand movements and eye blinking.  Maintained consciousness throughout episode.  Pt had no memory of the seizure, which was relayed by his dad.    2015/6/8 - Pt was running around a track.  Said he overexerted himself over the last three laps.  Said he had another event immediately after exercise.  Again, this episode was brief (30s-1min) and was characterized by walking in circles, eye blinking, and stereotypic hand motions of rubbing his fingers. Had no intraictal recall.      Previous History (2015/4/1)   The patient was referred for consultation by Dr. Guerrero due to want to transfer care here due to long distance, patient is living [facility].  The patient was accompanied by his mother and father who provided some of the history.      Seizure surgery 2008 for epilepsy. Since then, less frequent and not as strong, still have seizure twice a month with current medication dosage. He is compliant with medication, tolerates medication well, no side effect noticed. On current regime for about 5 years, change to onfi 2-3 years ago in place of keppra.       Epilepsy History  ED visits: last year, feel someone talking to him  Episodes of SE  Change in meds    Seizure Seminology  Seizure Type 1  Classification:   Aura - feel it is coming, his mind told him that seizure is coming  Ictus  - Nonconv -  - Conv - whole body jerking more on the right side, after a couple of jerk, he is out, first started has tongue biting and incontinence  - Duration - 2 minutes  Post-ictal  - Symptoms: can not talk, sleepiness no headache  - Duration: 5-10 minutes  Age of onset : 6 year old  Current Seizure Frequency -  Initially once week,  Last Seizure: 10 years ago    Seizure Type 2: after seizure  Classification:   Aura - feel it is coming, but not as strong as before  Ictus  - Nonconv -  - Conv - blinking and body jerk more on the arm.   - Duration - 30  sec  Post-ictal  - Symptoms: no sleepiness  - Duration: back to baseline after ictus right away  Age of onset : 29  Current Seizure Frequency -  Last Seizure: a couple weeks ago    sz per month 2012 2013 2014 2015 2016 2017   Doug         Feb         Mar         Apr    1     May         Deny    2     Jul     1    Aug         Sep     1    Oct         Nov         Dec         Tot           Seizure Triggers:   Sleep Deprivation - None  Other medicaitons - None  Psych/stress - excitement can trigger jorge athletic event, when feel frustrated  Photic stimulation - None  Hyperventilation - None  Medical Problems - None  Sensory Stimulation (light, sound, etc) - None  Missed dose of meds - Denies missing meds since last visit though has missed meds in the past which were closely followed by seizures.   Computer use may trigger- sometimes  Exercise/overexertion  may trigger- sometimes    AED Treatments  Present regimen  clobezam (Onfi or Frizium, CLB):  40 mg bid  lacosamide (Vimpat, LCS) :  200 mg bid  lamotrigine (Lamictal, LTG)   600 mg bid    Results for GEORGETTE MONCADA (MRN [id number]) as of 12/16/2015 14:24   Ref. Range 4/1/2015 13:20 8/5/2015 14:05 8/21/2015 11:40   Lamotrigine Lvl Latest Range: 2.0-15.0 ug/mL 13.4  17.3 (H)     Ativan PRN    Prior treatments  carbamazepine (Tegretol, CBZ):  tried in the past  ethosuximide (Zarontin, ESM):  tried in the past  felbamate (felbatol, FBM):   tried in the past  gabapentin (Neurontin, GPN):  tried in the past  levetiracetam (Keppra, LEV):   tried in the past  phenytoin (Dilantin, PHT):   tried in the past  primidone (Mysoline, PRM):   tried in the past  topiramate (Topamax, TPM):   tried in the past  valproic acid (Depakote, VPA):  tried in the past   clorazepate (Tranxene, CLZ):   Stopped previous visits.     Not tried  acetazolamide (Diamox, AZM)  amantadine  eslicarbazine (Aptiom, ESL)  methsuximide (Celontin, MSM)  methyphenytoin (Mesantion, MHT)  oxcarbazepine (Trileptal  OXC)  perampanel (Fycompa, FCP)   phenobarbital (Pb)  pregabalin (Lyrica, PGB)  retigabine (Potiga, RTG)  rufinamide (Banzel, RUF)  tiagabine (Gabatril,  TGB)  viagabatrin, (Sabril, VGB)  vagal nerve stimulator (VNS)  zonisamide (Zonegran, ZNA)  Benzodiazepines  diazepam - rectal (Diastatl)  diazepam - oral (Valium, DZ)  clonazepam (Klonopin, CZP)  Brain Stimulation  Vagal Nerve Stimulation-n/a  DBS- n/a    Compliance method  Memory - yes  Mom or Spouse - Yes  Pill Box - no  Henrique calendar - no  Turn over medication bottle - no  Phone alarm - no    Seizure Evaluation  EEG Routine -   EEG Ambulatory -   EEG\Video Monitoring - 2014  MRI/MRA - 2015  CT/CTA Scan -   PET Scan -   Neuropsychological evaluation -   DEXA Scan  Had continuous EEG in 2014 no find anything abnormal.  MRI brain 2015: no new changes.     Potential Epilepsy Risk Factors:   Pregnancy/Labor/Delivery - full term uncomplicated pregnancy labor and vaginal delivery  Febrile seizures - none  Head injury  - none  CNS infection - none     Stroke - none  Family Hx of Sz - none    PAST MEDICAL HISTORY:   Epilepsy     PAST SURGICAL HISTORY: epilepsy surgery 2008    FAMILY HISTORY:   Family History   Problem Relation Age of Onset    Diabetes Father     Cancer Father     Diabetes Sister     Cancer Paternal Aunt     Diabetes Paternal Uncle     Cancer Paternal Grandmother     Diabetes Paternal Grandfather     Cancer Paternal Grandfather            SOCIAL HISTORY:   Social History     Social History    Marital status: Single     Spouse name: N/A    Number of children: N/A    Years of education: N/A     Occupational History    Not on file.     Social History Main Topics    Smoking status: Never Smoker    Smokeless tobacco: Not on file    Alcohol use No    Drug use: No    Sexual activity: Not on file     Other Topics Concern    Not on file     Social History Narrative    No narrative on file        SUBSTANCE USE:  Social History     Social History  "Main Topics    Smoking status: Never Smoker    Smokeless tobacco: Not on file    Alcohol use No    Drug use: No    Sexual activity: Not on file      Social History   Substance Use Topics    Smoking status: Never Smoker    Smokeless tobacco: Not on file    Alcohol use No        ALLERGIES: Patient has no known allergies.     /69   Pulse 72   Ht 5' 11" (1.803 m)   Wt 93.8 kg (206 lb 12.7 oz)   BMI 28.84 kg/m²     Higher Cortical Function:    Patient is a well developed, pleasant, well groomed individual appearing their stated age  Oriented - intact to person, place and time and followed two step instruction correctly.    Spell WORLD - Patients response: forward - WORLD; backwards -   Subtraction of serial 7s from 100 - 3 steps performed correct  Memory - Patient recalled 3 of 3 objects after 5 minutes  Fund of knowledge was appropriate.    R-L Orientation - Intact - Impaired  Language - Speech showed impaired verbal fluency and expression.  Some perseveration of speech.  Agnosias, agraphesthesia, or astereognosis - not present.   Extinction with double simultaneous stimulation:        Proximal-distal stimulation - Not present        Right-left stimulation - Not present  Cranial Nerves II - XII:    EOMs were intact with normal smooth and a mild end gaze nystagmus.    PERRLA. D/C   Funduscopic exam - disc were flat with normal A/V ratio and no exudates or hemorrhages. Visual fields were full to confrontation.    Motor - facial movement was symmetrical and normal.    Facial sensory - Light touch and pin prick sensations were normal.    Hearing was normal to finger rub.  Palate moved well and was symmetrical with normal palatal and oral sensation.    Tongue movement was full & the patient could say "la la la" and "Ka Ka Ka" without  difficulty. Patient repeated Christian and Pentec[employer]al without difficulty. Normal power and bulk was found in the massiter and rotator muscles of the neck.  Motor: Power, bulk " and tone were normal in all extremities.  Sensory: Light touch, pin prick, vibration and position senses were normal in all extremities.    Coordination:       Rapid alternating movements and rapid finger tapping - normal.       Finger to nose - nl.       Arm roll - symmetrical.    Gait:  Station, gait and tandem walking were done without difficulty and Romberg was negative.  Frontal release signs  Sign Left Right   Glabellar Mild    Snout absent absent   Root absent absent   Suck absent absent   Palmomental absent absent          Deep tendon reflexes:    Reflex L R   Bicpets 2+ 2+   Tricepts 2+ 2+   Brachio-radialis 2+ 2+   Knee 2+ 2+   Ankle 2+ 2+   Babinski No No     Tremor: resting, postural, intentional - none    Pulses    Carotids - strong without bruits    Peripheral - strong and symmetrical      IMPRESSION  1. Complex partial  2. GTC  3. Post Left temporal lobe resection  4. He has infrequent seizures and improved with last dosage change    DISPOSITION:   1. Continue clobazam 80 mg per day   2. Continue lacosamide (Vimpat, LCS) : 200 mg 1 BID  3. Continue lamotrigine (Lamictal, LTG) 600 mg BID (3 tab BID)   4.  Continue Perampanel 2 mg but increase to 3 tabs QD   5. I discussed VNS with patient and his Dad.  Literature was provided to patient.  We will discuss this further at the next visit  6.  RTC in 4 months

## 2018-02-22 ENCOUNTER — TELEPHONE (OUTPATIENT)
Dept: NEUROLOGY | Facility: CLINIC | Age: 43
End: 2018-02-22

## 2018-02-26 RX ORDER — LACOSAMIDE 200 MG/1
300 TABLET, FILM COATED ORAL 2 TIMES DAILY
Qty: 270 TABLET | Refills: 2 | Status: SHIPPED | OUTPATIENT
Start: 2018-02-26 | End: 2018-09-13 | Stop reason: SDUPTHER

## 2018-02-27 NOTE — TELEPHONE ENCOUNTER
Patient's Onfi does not require a Prior Authorization.Notice faxed to [facility]'s mail order Pharmacy as well as face sheet.

## 2018-03-23 ENCOUNTER — TELEPHONE (OUTPATIENT)
Dept: NEUROLOGY | Facility: CLINIC | Age: 43
End: 2018-03-23

## 2018-03-23 NOTE — TELEPHONE ENCOUNTER
----- Message from Keli Rome RN sent at 3/23/2018  9:19 AM CDT -----  Can you schedule for Bridgett in May or around then?

## 2018-05-30 ENCOUNTER — TELEPHONE (OUTPATIENT)
Dept: NEUROLOGY | Facility: CLINIC | Age: 43
End: 2018-05-30

## 2018-05-30 NOTE — TELEPHONE ENCOUNTER
----- Message from Yair Hernandez sent at 5/30/2018  9:45 AM CDT -----    Patient is calling to inform the staff to be on the lookout for the refill request coming from [facility] for the (cloBAZam (ONFI) 20 mg Tab  )

## 2018-07-06 ENCOUNTER — TELEPHONE (OUTPATIENT)
Dept: NEUROLOGY | Facility: CLINIC | Age: 43
End: 2018-07-06

## 2018-07-06 NOTE — TELEPHONE ENCOUNTER
----- Message from Kenji Workman sent at 7/6/2018 10:51 AM CDT -----  Needs Advice    Reason for call: Pt states [facility] is faxing over refill request lamoTRIgine (LAMICTAL) 200 MG tablet     Communication Preference: [phone]  Additional Information:

## 2018-07-30 ENCOUNTER — TELEPHONE (OUTPATIENT)
Dept: NEUROLOGY | Facility: CLINIC | Age: 43
End: 2018-07-30

## 2018-07-30 NOTE — TELEPHONE ENCOUNTER
----- Message from Kenji Workman sent at 7/30/2018  9:09 AM CDT -----  Rx Refill/Request     Is this a Refill or New Rx: Refill   Rx Name and Strength:  perampanel (FYCOMPA) 2 mg Tab  Preferred Pharmacy with phone number: See below  Communication Preference: [phone]  Additional Information: Pt states he needs this completed asap bc he's running out of the medication    ,  [facility] [address] [place] [address], [place] - [address] [facility]  [address] [facility] [address]  Phone: [phone] Fax: [phone]

## 2018-09-13 ENCOUNTER — OFFICE VISIT (OUTPATIENT)
Dept: NEUROLOGY | Facility: CLINIC | Age: 43
End: 2018-09-13
Payer: COMMERCIAL

## 2018-09-13 VITALS
BODY MASS INDEX: 28.7 KG/M2 | HEART RATE: 78 BPM | WEIGHT: 205 LBS | SYSTOLIC BLOOD PRESSURE: 114 MMHG | DIASTOLIC BLOOD PRESSURE: 73 MMHG | HEIGHT: 71 IN

## 2018-09-13 DIAGNOSIS — F07.0 TEMPORAL LOBECTOMY BEHAVIOR SYNDROME: Primary | ICD-10-CM

## 2018-09-13 DIAGNOSIS — G40.219 LOCALIZATION-RELATED SYMPTOMATIC EPILEPSY AND EPILEPTIC SYNDROMES WITH COMPLEX PARTIAL SEIZURES, INTRACTABLE, WITHOUT STATUS EPILEPTICUS: ICD-10-CM

## 2018-09-13 DIAGNOSIS — J30.89 ENVIRONMENTAL AND SEASONAL ALLERGIES: ICD-10-CM

## 2018-09-13 PROCEDURE — 99999 PR PBB SHADOW E&M-EST. PATIENT-LVL III: CPT | Mod: PBBFAC,,, | Performed by: PSYCHIATRY & NEUROLOGY

## 2018-09-13 PROCEDURE — 99214 OFFICE O/P EST MOD 30 MIN: CPT | Mod: S$GLB,,, | Performed by: PSYCHIATRY & NEUROLOGY

## 2018-09-13 RX ORDER — CLOBAZAM 20 MG/1
40 TABLET ORAL 2 TIMES DAILY
Qty: 360 TABLET | Refills: 2 | Status: SHIPPED | OUTPATIENT
Start: 2018-09-13 | End: 2018-11-30 | Stop reason: SDUPTHER

## 2018-09-13 RX ORDER — LACOSAMIDE 200 MG/1
300 TABLET, FILM COATED ORAL 2 TIMES DAILY
Qty: 270 TABLET | Refills: 2 | Status: SHIPPED | OUTPATIENT
Start: 2018-09-13 | End: 2018-11-30 | Stop reason: SDUPTHER

## 2018-09-13 RX ORDER — LAMOTRIGINE 200 MG/1
300 TABLET ORAL 2 TIMES DAILY
Qty: 270 TABLET | Refills: 3 | Status: SHIPPED | OUTPATIENT
Start: 2018-09-13 | End: 2019-08-29 | Stop reason: SDUPTHER

## 2018-09-13 NOTE — PROGRESS NOTES
Name: Georgette Nagel  MRN: [id number]   CSN: [id number]      Date: 09/13/2018    HISTORY OF PRESENT ILLNESS (HPI)  The [facility] is [facility] 42 [facility]     2018/09/13  Patient reports 3 mild CPS since the last visit.  Last visit I gave him a schedule to increase lacosamide in two steps to 200 mg 1½ BID.  When he reached this dose he became dizzy and reduced the morning dose to 1 tab with resolution of the dizzinesss.  Last visit I discussed VNS treatment and today the patient wants to proceed with implanting the device.  He has no other medical problems except allergies.  He does take loratadine which works on the H1 reception similar to Benadryl but has not been been implicated in lowering seizure threshhold.      2018/01/26  The patient reports 3 sz since the last visit.  The most recent may have resulted from missed dose.  No triggers evident for the other 2 sz.  The patient reports he has an aura but can not describe what he perceives.      2017/07/13  In the past 6 months the patient has experienced five seizures (about 1 per month).   The patient was having difficulty expressing himself - speech is halting and disconnected.  He has been taking ativan prior to social event or watching sports to prevent seizure.  He has been taking ativan about 2 times a month.      2016/04/19 and 2017/01/23   Since the last visit the patient has experienced one seizures (12 Jan 2017) .  Meds were increased last time and he is tolerating the new dosages well.  There was no obvious provocative reason.  He has a good strategy for compliance.  He had no concurrent medical problem and had not taken benadryl or other proconvulsive medication.      Results for GEORGETTE NAGEL (MRN [id number]) as of 1/23/2017 14:26   Ref. Range 4/1/2015 13:20 8/5/2015 14:05 8/21/2015 11:40   Lamotrigine Lvl Latest Ref Range: 2.0 - 15.0 ug/mL 13.4  17.3 (H)   MRI PREVIOUS Unknown  Rpt    Lacosamide Latest Ref Range: 1.0 - 10.0 mcg/mL 8.0            pt has had the following episodes:  2015/4/8 - Pt had just finished a walk.  Eye blinking and stereotypic hand movements.  No generalized convulsions were noted. Didn't lose consciousness.  Lasted 30s to 1 minute.  2015/6/6 - Pt was in his room.  Came to his dad to give him his phone to record what was happening.  Said he has the feeling that he's about to have a seizure.  Says there is no aura per se.  Says a thought that he's about to have a seizure pops into his head, which usually precedes his events.  Said his speech got markedly worse during the event.  Eye blinking lasted 30s to 1 minute and resolved spontaneously.  Also had stereotypic hand movements and eye blinking.  Maintained consciousness throughout episode.  Pt had no memory of the seizure, which was relayed by his dad.    2015/6/8 - Pt was running around a track.  Said he overexerted himself over the last three laps.  Said he had another event immediately after exercise.  Again, this episode was brief (30s-1min) and was characterized by walking in circles, eye blinking, and stereotypic hand motions of rubbing his fingers. Had no intraictal recall.      Previous History (2015/4/1)   The patient was referred for consultation by Dr. Guerrero due to want to transfer care here due to long distance, patient is living [facility].  The patient was accompanied by his mother and father who provided some of the history.      Seizure surgery 2008 for epilepsy. Since then, less frequent and not as strong, still have seizure twice a month with current medication dosage. He is compliant with medication, tolerates medication well, no side effect noticed. On current regime for about 5 years, change to onfi 2-3 years ago in place of keppra.       Epilepsy History  ED visits: last year, feel someone talking to him  Episodes of SE  Change in meds    Seizure Seminology  Seizure Type 1  Classification:   Aura - feel it is coming, his mind told him that seizure is  coming  Ictus  - Nonconv -  - Conv - whole body jerking more on the right side, after a couple of jerk, he is out, first started has tongue biting and incontinence  - Duration - 2 minutes  Post-ictal  - Symptoms: can not talk, sleepiness no headache  - Duration: 5-10 minutes  Age of onset : 6 year old  Current Seizure Frequency -  Initially once week,  Last Seizure: 10 years ago    Seizure Type 2: after seizure  Classification:   Aura - feel it is coming, but not as strong as before  Ictus  - Nonconv -  - Conv - blinking and body jerk more on the arm.   - Duration - 30 sec  Post-ictal  - Symptoms: no sleepiness  - Duration: back to baseline after ictus right away  Age of onset : 29  Current Seizure Frequency -  Last Seizure: a couple weeks ago    sz per month 2012 2013 2014 2015 2016 2017   Doug         Feb         Mar         Apr    1     May         Deny    2     Jul     1    Aug         Sep     1    Oct         Nov         Dec         Tot           Seizure Triggers:   Sleep Deprivation - None  Other medicaitons - None  Psych/stress - excitement can trigger jorge athletic event, when feel frustrated  Photic stimulation - None  Hyperventilation - None  Medical Problems - None  Sensory Stimulation (light, sound, etc) - None  Missed dose of meds - Denies missing meds since last visit though has missed meds in the past which were closely followed by seizures.   Computer use may trigger- sometimes  Exercise/overexertion  may trigger- sometimes    AED Treatments  Present regimen  clobezam (Onfi or Frizium, CLB):  40 mg bid  lacosamide (Vimpat, LCS) :  200 mg bid  lamotrigine (Lamictal, LTG)   600 mg bid    Results for GEORGETTE MONCADA (MRN [id number]) as of 12/16/2015 14:24   Ref. Range 4/1/2015 13:20 8/5/2015 14:05 8/21/2015 11:40   Lamotrigine Lvl Latest Range: 2.0-15.0 ug/mL 13.4  17.3 (H)     Ativan PRN    Prior treatments  carbamazepine (Tegretol, CBZ):  tried in the past  ethosuximide (Zarontin, ESM):  tried in the  past  felbamate (felbatol, FBM):   tried in the past  gabapentin (Neurontin, GPN):  tried in the past  levetiracetam (Keppra, LEV):   tried in the past  phenytoin (Dilantin, PHT):   tried in the past  primidone (Mysoline, PRM):   tried in the past  topiramate (Topamax, TPM):   tried in the past  valproic acid (Depakote, VPA):  tried in the past   clorazepate (Tranxene, CLZ):   Stopped previous visits.     Not tried  acetazolamide (Diamox, AZM)  amantadine  eslicarbazine (Aptiom, ESL)  methsuximide (Celontin, MSM)  methyphenytoin (Mesantion, MHT)  oxcarbazepine (Trileptal OXC)  perampanel (Fycompa, FCP)   phenobarbital (Pb)  pregabalin (Lyrica, PGB)  retigabine (Potiga, RTG)  rufinamide (Banzel, RUF)  tiagabine (Gabatril,  TGB)  viagabatrin, (Sabril, VGB)  vagal nerve stimulator (VNS)  zonisamide (Zonegran, ZNA)  Benzodiazepines  diazepam - rectal (Diastatl)  diazepam - oral (Valium, DZ)  clonazepam (Klonopin, CZP)  Brain Stimulation  Vagal Nerve Stimulation-n/a  DBS- n/a    Compliance method  Memory - yes  Mom or Spouse - Yes  Pill Box - no  Henrique calendar - no  Turn over medication bottle - no  Phone alarm - no    Seizure Evaluation  EEG Routine -   EEG Ambulatory -   EEG\Video Monitoring - 2014  MRI/MRA - 2015  CT/CTA Scan -   PET Scan -   Neuropsychological evaluation -   DEXA Scan  Had continuous EEG in 2014 no find anything abnormal.  MRI brain 2015: no new changes.     Potential Epilepsy Risk Factors:   Pregnancy/Labor/Delivery - full term uncomplicated pregnancy labor and vaginal delivery  Febrile seizures - none  Head injury  - none  CNS infection - none     Stroke - none  Family Hx of Sz - none    PAST MEDICAL HISTORY:   Epilepsy     PAST SURGICAL HISTORY: epilepsy surgery 2008    FAMILY HISTORY:   Family History   Problem Relation Age of Onset    Diabetes Father     Cancer Father     Diabetes Sister     Cancer Paternal Aunt     Diabetes Paternal Uncle     Cancer Paternal Grandmother     Diabetes  "Paternal Grandfather     Cancer Paternal Grandfather            SOCIAL HISTORY:   Social History     Socioeconomic History    Marital status: Single     Spouse name: Not on file    Number of children: Not on file    Years of education: Not on file    Highest education level: Not on file   Social Needs    Financial resource strain: Not on file    Food insecurity - worry: Not on file    Food insecurity - inability: Not on file    Transportation needs - medical: Not on file    Transportation needs - non-medical: Not on file   Occupational History    Not on file   Tobacco Use    Smoking status: Never Smoker   Substance and Sexual Activity    Alcohol use: No    Drug use: No    Sexual activity: Not on file   Other Topics Concern    Not on file   Social History Narrative    Not on file        SUBSTANCE USE:  Social History     Tobacco Use    Smoking status: Never Smoker   Substance and Sexual Activity    Alcohol use: No    Drug use: No    Sexual activity: Not on file      Social History     Tobacco Use    Smoking status: Never Smoker   Substance Use Topics    Alcohol use: No        ALLERGIES: Patient has no known allergies.     /73   Pulse 78   Ht 5' 11" (1.803 m)   Wt 93 kg (205 lb 0.4 oz)   BMI 28.60 kg/m²     Higher Cortical Function:    Patient is a well developed, pleasant, well groomed individual appearing their stated age  Oriented - intact to person, place and time and followed two step instruction correctly.    Spell WORLD - Patients response: forward - WORLD; backwards -   Subtraction of serial 7s from 100 - 3 steps performed correct  Memory - Patient recalled 3 of 3 objects after 5 minutes  Fund of knowledge was appropriate.    R-L Orientation - Intact - Impaired  Language - Speech showed impaired verbal fluency and expression.  Some perseveration of speech.  Agnosias, agraphesthesia, or astereognosis - not present.   Extinction with double simultaneous stimulation:        " "Proximal-distal stimulation - Not present        Right-left stimulation - Not present  Cranial Nerves II - XII:    EOMs were intact with normal smooth and a mild end gaze nystagmus.    PERRLA. D/C   Funduscopic exam - disc were flat with normal A/V ratio and no exudates or hemorrhages. Visual fields were full to confrontation.    Motor - facial movement was symmetrical and normal.    Facial sensory - Light touch and pin prick sensations were normal.    Hearing was normal to finger rub.  Palate moved well and was symmetrical with normal palatal and oral sensation.    Tongue movement was full & the patient could say "la la la" and "Ka Ka Ka" without  difficulty. Patient repeated Shinto and Restorationism without difficulty. Normal power and bulk was found in the massiter and rotator muscles of the neck.  Motor: Power, bulk and tone were normal in all extremities.  Sensory: Light touch, pin prick, vibration and position senses were normal in all extremities.    Coordination:       Rapid alternating movements and rapid finger tapping - normal.       Finger to nose - nl.       Arm roll - symmetrical.    Gait:  Station, gait and tandem walking were done without difficulty and Romberg was negative.  Frontal release signs  Sign Left Right   Glabellar Mild    Snout absent absent   Root absent absent   Suck absent absent   Palmomental absent absent          Deep tendon reflexes:    Reflex L R   Bicpets 2+ 2+   Tricepts 2+ 2+   Brachio-radialis 2+ 2+   Knee 2+ 2+   Ankle 2+ 2+   Babinski No No     Tremor: resting, postural, intentional - none    Pulses    Carotids - strong without bruits    Peripheral - strong and symmetrical      IMPRESSION  1. Complex partial  2. GTC  3. Post Left temporal lobe resection  4. He has infrequent seizures and improved with last dosage change    DISPOSITION:   1. Continue clobazam 80 mg per day   2. Continue lacosamide (Vimpat, LCS) : 200 mg 1 BID  3. Continue lamotrigine (Lamictal, LTG) 600 mg BID " (3 tab BID)   4.  Continue Perampanel 2 mg but increase to 3 tabs QD   5. I discussed VNS with patient and his Dad.  The patient is a good candidate for this therapy and I will refer pt to Dr Saldana.   6.  RTC after the VNS implanted.

## 2018-09-18 ENCOUNTER — TELEPHONE (OUTPATIENT)
Dept: NEUROLOGY | Facility: CLINIC | Age: 43
End: 2018-09-18

## 2018-09-18 RX ORDER — LORAZEPAM 1 MG/1
TABLET ORAL
Qty: 15 TABLET | Refills: 0 | OUTPATIENT
Start: 2018-09-18

## 2018-09-18 NOTE — TELEPHONE ENCOUNTER
----- Message from Radha Roca sent at 9/18/2018  9:51 AM CDT -----  Contact: pt @ [phone]  Calling regarding medication: Ativan, says that he just received a call from the pharmacy saying that the medication was denied. Please call.

## 2018-09-26 ENCOUNTER — TELEPHONE (OUTPATIENT)
Dept: NEUROLOGY | Facility: CLINIC | Age: 43
End: 2018-09-26

## 2018-11-07 ENCOUNTER — TELEPHONE (OUTPATIENT)
Dept: NEUROSURGERY | Facility: CLINIC | Age: 43
End: 2018-11-07

## 2018-11-07 NOTE — TELEPHONE ENCOUNTER
JANETTM informing the Pt of the rescheduling of his appointment to 11/30/18 due to change in  Schedule

## 2018-11-30 NOTE — TELEPHONE ENCOUNTER
----- Message from Rohini Castro sent at 11/30/2018 11:31 AM CST -----  Contact: Susan Duron    [phone]  Calling to request a refill for cloBAZam (ONFI) 20 mg Tab and VIMPAT 200 mg Tab tablet.    [facility] - [address] AT [facility]             [phone] (Phone)            [phone] (Fax)

## 2018-12-04 ENCOUNTER — TELEPHONE (OUTPATIENT)
Dept: NEUROLOGY | Facility: CLINIC | Age: 43
End: 2018-12-04

## 2018-12-04 RX ORDER — CLOBAZAM 20 MG/1
40 TABLET ORAL 2 TIMES DAILY
Qty: 360 TABLET | Refills: 2 | Status: SHIPPED | OUTPATIENT
Start: 2018-12-04 | End: 2019-08-29 | Stop reason: SDUPTHER

## 2018-12-04 RX ORDER — LACOSAMIDE 200 MG/1
300 TABLET, FILM COATED ORAL 2 TIMES DAILY
Qty: 270 TABLET | Refills: 2 | Status: SHIPPED | OUTPATIENT
Start: 2018-12-04 | End: 2019-08-29 | Stop reason: SDUPTHER

## 2019-01-04 ENCOUNTER — OFFICE VISIT (OUTPATIENT)
Dept: NEUROSURGERY | Facility: CLINIC | Age: 44
End: 2019-01-04
Attending: NEUROLOGICAL SURGERY
Payer: COMMERCIAL

## 2019-01-04 VITALS
DIASTOLIC BLOOD PRESSURE: 79 MMHG | HEART RATE: 84 BPM | BODY MASS INDEX: 28.15 KG/M2 | TEMPERATURE: 97 F | WEIGHT: 201.81 LBS | SYSTOLIC BLOOD PRESSURE: 131 MMHG

## 2019-01-04 DIAGNOSIS — G40.219 LOCALIZATION-RELATED SYMPTOMATIC EPILEPSY AND EPILEPTIC SYNDROMES WITH COMPLEX PARTIAL SEIZURES, INTRACTABLE, WITHOUT STATUS EPILEPTICUS: Primary | ICD-10-CM

## 2019-01-04 DIAGNOSIS — F07.0 TEMPORAL LOBECTOMY BEHAVIOR SYNDROME: ICD-10-CM

## 2019-01-04 PROCEDURE — 99205 PR OFFICE/OUTPT VISIT, NEW, LEVL V, 60-74 MIN: ICD-10-PCS | Mod: S$GLB,,, | Performed by: PHYSICIAN ASSISTANT

## 2019-01-04 PROCEDURE — 99999 PR PBB SHADOW E&M-EST. PATIENT-LVL III: CPT | Mod: PBBFAC,,, | Performed by: PHYSICIAN ASSISTANT

## 2019-01-04 PROCEDURE — 99205 OFFICE O/P NEW HI 60 MIN: CPT | Mod: S$GLB,,, | Performed by: PHYSICIAN ASSISTANT

## 2019-01-04 PROCEDURE — 3008F PR BODY MASS INDEX (BMI) DOCUMENTED: ICD-10-PCS | Mod: S$GLB,,, | Performed by: PHYSICIAN ASSISTANT

## 2019-01-04 PROCEDURE — 3008F BODY MASS INDEX DOCD: CPT | Mod: S$GLB,,, | Performed by: PHYSICIAN ASSISTANT

## 2019-01-04 PROCEDURE — 99999 PR PBB SHADOW E&M-EST. PATIENT-LVL III: ICD-10-PCS | Mod: PBBFAC,,, | Performed by: PHYSICIAN ASSISTANT

## 2019-01-04 NOTE — PROGRESS NOTES
[facility] - [facility] 7th Fl  Neurosurgery    SUBJECTIVE:     History of Present Illness:  Nicholas Nagel is a 43 y.o. male with complex partial epilepsy who presents as a referral from Dr. Urias for surgical consultation for vagal nerve stimulator placement. He is s/p left temporal lobe resection. Increased seizure frequency of CPS noted in Dr. Urias's note in September.    EEG Results:  Had continuous EEG in 2014 nothing abnormal.    Current medications:  clobezam (Onfi or Frizium, CLB):  40 mg bid  lacosamide (Vimpat, LCS) :  200 mg bid  lamotrigine (Lamictal, LTG)   600 mg bid    Failed medications:  carbamazepine (Tegretol, CBZ):        tried in the past  ethosuximide (Zarontin, ESM):           tried in the past  felbamate (felbatol, FBM):                  tried in the past  gabapentin (Neurontin, GPN):            tried in the past  levetiracetam (Keppra, LEV):                         tried in the past  phenytoin (Dilantin, PHT):                  tried in the past  primidone (Mysoline, PRM):               tried in the past  topiramate (Topamax, TPM):              tried in the past  valproic acid (Depakote, VPA):          tried in the past   clorazepate (Tranxene, CLZ):            Stopped previous visits.         Review of patient's allergies indicates:  No Known Allergies    Past Medical History:   Diagnosis Date    Epilepsy 1981       Past Surgical History:   Procedure Laterality Date    epilepsy surgery Left 2008        Family History   Problem Relation Age of Onset    Diabetes Father     Cancer Father     Diabetes Sister     Cancer Paternal Aunt     Diabetes Paternal Uncle     Cancer Paternal Grandmother     Diabetes Paternal Grandfather     Cancer Paternal Grandfather        Social History     Socioeconomic History    Marital status: Single     Spouse name: Not on file    Number of children: Not on file    Years of education: Not on file    Highest education level: Not on file    Social Needs    Financial resource strain: Not on file    Food insecurity - worry: Not on file    Food insecurity - inability: Not on file    Transportation needs - medical: Not on file    Transportation needs - non-medical: Not on file   Occupational History    Not on file   Tobacco Use    Smoking status: Never Smoker   Substance and Sexual Activity    Alcohol use: No    Drug use: No    Sexual activity: Not on file   Other Topics Concern    Not on file   Social History Narrative    Not on file       Review of Systems:  Constitutional: no fever, chills or night sweats. No changes in weight   Eyes: no visual changes   ENT: no nasal congestion or sore throat   Respiratory: no cough or shortness of breath   Cardiovascular: no chest pain or palpitations   Gastrointestinal: no nausea or vomiting   Genitourinary: no hematuria or dysuria   Integument/Breast: no rash or pruritis   Hematologic/Lymphatic: no easy bruising or lymphadenopathy   Musculoskeletal: no arthralgias or myalgias.   Neurological: no seizures or tremors   Behavioral/Psych: no auditory or visual hallucinations   Endocrine: no heat or cold intolerance     OBJECTIVE:     Vital Signs (Most Recent):  Vitals:    01/04/19 0946   BP: 131/79   Pulse: 84   Temp: 97.4 °F (36.3 °C)   TempSrc: Oral   Weight: 91.5 kg (201 lb 12.8 oz)       Physical Exam:  General: well developed, well nourished, no distress.   Head: normocephalic, atraumatic  Neurologic: Alert and oriented. Thought content appropriate.  GCS: Motor: 6/Verbal: 5/Eyes: 4 GCS Total: 15  Mental Status: Awake, Alert, Oriented x 4  Language: No aphasia  Speech: No dysarthria  Cranial nerves: face symmetric, tongue midline, CN II-XII grossly intact.   Eyes: pupils equal, round, reactive to light with accomodation, EOMI.  Pulmonary: normal respirations, no signs of respiratory distress  Abdomen: soft, non-distended, not tender to palpation  Sensory: intact to light touch throughout    Motor  Strength: Moves all extremities spontaneously with good tone.  Full strength upper and lower extremities. No abnormal movements seen.     DTR's: 2 + and symmetric in UE and LE  Pronator Drift: no drift noted  Finger-to-nose: Intact bilaterally  Babinski: absent  Pulses: 2+ and symmetric radial and dorsalis pedis.  Skin: Skin is warm, dry and intact.    Diagnostic Results:  None.    ASSESSMENT/PLAN:     Nicholas Nagel is a 43 y.o. male who presents for surgical consultation for vagal nerve stimulator placement. Risks, benefits, indications, and alternatives were discussed with the patient. He and his family wish to further discuss the procedure with Dr. Saldana before proceeding with surgery. Will schedule.      Kim Escamilla PA-C  Neurosurgery

## 2019-01-04 NOTE — LETTER
January 8, 2019      MARIANA Urias MD  [address]  [facility] [address]           [facility]  [address]  [facility] [address]  Phone: [phone]          Patient: Nicholas Nagel   MR Number: [id number]   YOB: 1975   Date of Visit: 1/4/2019       Dear Dr. MARIANA Urias:    Thank you for referring Nicholas Nagel to me for evaluation. Attached you will find relevant portions of my assessment and plan of care.    If you have questions, please do not hesitate to call me. I look forward to following Nicholas Nagel along with you.    Sincerely,    Kim Escamilla PA-C    Enclosure  CC:  No Recipients    If you would like to receive this communication electronically, please contact [email or web address] or [phone] to request more information on [facility] [employer] access.    For providers and/or their staff who would like to refer a patient to [facility], please contact us through our one-stop-shop provider referral line, [facility], at [phone].    If you feel you have received this communication in error or would no longer like to receive these types of communications, please e-mail [email or web address]

## 2019-01-11 ENCOUNTER — TELEPHONE (OUTPATIENT)
Dept: NEUROSURGERY | Facility: CLINIC | Age: 44
End: 2019-01-11

## 2019-01-11 DIAGNOSIS — G40.219 LOCALIZATION-RELATED (FOCAL) (PARTIAL) SYMPTOMATIC EPILEPSY AND EPILEPTIC SYNDROMES WITH COMPLEX PARTIAL SEIZURES, INTRACTABLE, WITHOUT STATUS EPILEPTICUS: Primary | ICD-10-CM

## 2019-01-14 ENCOUNTER — ANESTHESIA EVENT (OUTPATIENT)
Dept: SURGERY | Facility: HOSPITAL | Age: 44
End: 2019-01-14
Payer: COMMERCIAL

## 2019-01-14 ENCOUNTER — TELEPHONE (OUTPATIENT)
Dept: PREADMISSION TESTING | Facility: HOSPITAL | Age: 44
End: 2019-01-14

## 2019-01-14 DIAGNOSIS — Z01.818 PREOPERATIVE TESTING: Primary | ICD-10-CM

## 2019-01-14 RX ORDER — MULTIVITAMIN
1 TABLET ORAL DAILY
COMMUNITY

## 2019-01-14 NOTE — PRE ADMISSION SCREENING
Anesthesia Assessment: Preoperative EQUATION    Planned Procedure: Procedure(s) (LRB):  INSERTION, NEUROSTIMULATOR, VAGAL (Left)  Requested Anesthesia Type:General  Surgeon: Teddy Saldana MD  Service: Neurosurgery  Known or anticipated Date of Surgery:2/21/2019    Surgeon notes: reviewed    Electronic QUestionnaire Assessment completed via nurse interview with patient.    NO AQ    Triage considerations:     The patient has no apparent active cardiac condition (No unstable coronary Syndrome such as severe unstable angina or recent [<1 month] myocardial infarction, decompensated CHF, severe valvular   disease or significant arrhythmia)    Previous anesthesia records:No problems and Not available    [facility] months [facility]   Subspecialty notes: Neurology, NEUROSURGERY    Other important co-morbidities: EPILEPSY     Tests already available:  No recent tests.            Instructions given. (See in Nurse's note)    Optimization:  Anesthesia Preop Clinic Assessment not  Indicated   Plan:    Testing:  Hemoglobin, BMP and hematocrit       Patient  has previously scheduled Medical Appointment:2/19 Dr. Saldana    Navigation: Tests Scheduled. TBD                          Results will be tracked by Preop Clinic.                 1/14 Discussed case with Dr. Mendoza for risk classification, treat as Maxime 1, and for preop testing recommended(bmp and h/h).

## 2019-01-14 NOTE — TELEPHONE ENCOUNTER
----- Message from Lesly Razo RN sent at 1/14/2019 11:01 AM CST -----  Please schedule labs. Try 2/19 same day as Dr. Les murdockt. Patient lives out of town. Thanks!

## 2019-01-14 NOTE — ANESTHESIA PREPROCEDURE EVALUATION
Anesthesia Assessment: Preoperative EQUATION     Planned Procedure: Procedure(s) (LRB):  INSERTION, NEUROSTIMULATOR, VAGAL (Left)  Requested Anesthesia Type:General  Surgeon: Teddy Saldana MD  Service: Neurosurgery  Known or anticipated Date of Surgery:2/21/2019     Surgeon notes: reviewed     Electronic QUestionnaire Assessment completed via nurse interview with patient.    NO AQ     Triage considerations:      The patient has no apparent active cardiac condition (No unstable coronary Syndrome such as severe unstable angina or recent [<1 month] myocardial infarction, decompensated CHF, severe valvular   disease or significant arrhythmia)     Previous anesthesia records:No problems and Not available     Last PCP note: 6-12 months ago , within [facility]   Subspecialty notes: Neurology, NEUROSURGERY     Other important co-morbidities: EPILEPSY     Tests already available:  No recent tests.                            Instructions given. (See in Nurse's note)     Optimization:  Anesthesia Preop Clinic Assessment not  Indicated   Plan:               Testing:  Hemoglobin, BMP and hematocrit                                                   Patient  has previously scheduled Medical Appointment:2/19 Dr. Saldana     Navigation: Tests Scheduled. TBD                                                Results will be tracked by Preop Clinic.                           1/14 Discussed case with Dr. Mendoza for risk classification, treat as Maxime 1, and for preop testing recommended(bmp and h/h).           Electronically signed by Lesly Razo RN at 1/14/2019 10:47 AM       Pre-admit on 2/21/2019            Detailed Report    2/19 Labs resulted and noted.  Lesly Razo RN BSN  Preop Center                                                                                                                      01/14/2019  Nicholas Nagel is a 43 y.o., male.    Anesthesia Evaluation    I have reviewed the Patient Summary Reports.     I  have reviewed the Medications.     Review of Systems  Anesthesia Hx:  No problems with previous Anesthesia  History of prior surgery of interest to airway management or planning: Previous anesthesia: General Denies Family Hx of Anesthesia complications.   Denies Personal Hx of Anesthesia complications.   Social:  Non-Smoker, No Alcohol Use    Hematology/Oncology:  Hematology Normal   Oncology Normal     EENT/Dental:   Nasal Symptoms: (envionmental and seasonal allergies)   Cardiovascular:    Denies Angina.  Functional Capacity 4 METS, can climb 2 flights of stairs    Pulmonary:  Pulmonary Normal  Denies Shortness of breath.  Denies Recent URI.    Renal/:  Renal/ Normal     Hepatic/GI:  Hepatic/GI Normal    Musculoskeletal:  Musculoskeletal General/Symptoms: Functional capacity is ambulatory without assistance.    Neurological:   Seizures Complex partial seizures. Appx 1 per month - usually triggered by stress or physical overexertion     - Pt has expressive aphasia post left temporal lobectomy (2007) Neuro Symptoms Temporal lobectomy behavior syndrome. Difficulty in expressing selfSeizure Disorder, Chronic Epilepsy 7/2017 Seizure  Medication levels within therapeutic range.   Endocrine:  Endocrine Normal    Psych:  Psychiatric Normal           Physical Exam  General:  Well nourished    Airway/Jaw/Neck:  Airway Findings: Mouth Opening: Normal Tongue: Normal  General Airway Assessment: Adult  Mallampati: II  TM Distance: Normal, at least 6 cm        Eyes/Ears/Nose:  EYES/EARS/NOSE FINDINGS: Normal   Dental:  DENTAL FINDINGS: Normal    Heart/Vascular:  Heart Findings: Rate: Normal  Rhythm: Regular Rhythm        Mental Status:  Mental Status Findings:  Cooperative, Alert and Oriented         Anesthesia Plan  Type of Anesthesia, risks & benefits discussed:  Anesthesia Type:  general  Patient's Preference:   Intra-op Monitoring Plan: standard ASA monitors  Intra-op Monitoring Plan Comments:   Post Op Pain Control  Plan:   Post Op Pain Control Plan Comments:   Induction:   IV  Beta Blocker:  Patient is not currently on a Beta-Blocker (No further documentation required).       Informed Consent: Patient understands risks and agrees with Anesthesia plan.  Questions answered. Anesthesia consent signed with patient.  ASA Score: 3     Day of Surgery Review of History & Physical:    H&P update referred to the surgeon.         Ready For Surgery From Anesthesia Perspective.

## 2019-01-14 NOTE — PRE-PROCEDURE INSTRUCTIONS
Patient stated that has not had any problems with anesthesia in the past. Will need labs prior to surgery.  Our  will call to set up this appt. Preop instructions given. Hold asa, asa containing products, nsaids, vitamins and supplements one week prior to surgery.   Shower the night before surgery and the morning of surgery with an antibacterial soap( hibiclens or dial antibacterial soap).  Nothing on the skin once shower. Do not apply any deodorant, lotion, powder, perfume,or aftershave.  No jewelry going to surgery. May have solid foods, gum, and hard candy until 8 hours before surgery/procedure time.  May have clear liquids( water, gatorade, powerade or apple juice) until 2 hours prior to surgery/procedure time.  No red or orange drinks. If in doubt , drink water. Nothing to drink 2 hours before arrival time for surgery/procedure. If you are told to take medication in the morning of surgery, it may be taken with a sip of water. If your doctor tells you something different pertaining to when to stop eating or drinking, follow your doctor's instructions. Call for changes in status or questions.  Verbalizes understanding.

## 2019-01-16 ENCOUNTER — TELEPHONE (OUTPATIENT)
Dept: NEUROLOGY | Facility: CLINIC | Age: 44
End: 2019-01-16

## 2019-01-16 NOTE — TELEPHONE ENCOUNTER
Left message for pt explaining we will see him sometime in March following his Post op with Dr Saldana

## 2019-01-16 NOTE — TELEPHONE ENCOUNTER
----- Message from Rohini Castro sent at 1/16/2019  9:48 AM CST -----  Contact: self @ [phone]  Pt is calling to inform Dr Urias that Dr Saldana scheduled his VNS surgery on 2-21-19.  Pt would like to know how his f/u appts will be schedule.  Pls call.

## 2019-01-30 ENCOUNTER — TELEPHONE (OUTPATIENT)
Dept: NEUROLOGY | Facility: CLINIC | Age: 44
End: 2019-01-30

## 2019-02-07 ENCOUNTER — TELEPHONE (OUTPATIENT)
Dept: NEUROLOGY | Facility: CLINIC | Age: 44
End: 2019-02-07

## 2019-02-07 RX ORDER — LACOSAMIDE 200 MG/1
200 TABLET, FILM COATED ORAL 2 TIMES DAILY
Qty: 270 TABLET | Refills: 2 | Status: CANCELLED | OUTPATIENT
Start: 2019-02-07

## 2019-02-07 RX ORDER — CLOBAZAM 20 MG/1
40 TABLET ORAL 2 TIMES DAILY
Qty: 360 TABLET | Refills: 2 | Status: CANCELLED | OUTPATIENT
Start: 2019-02-07

## 2019-02-07 NOTE — TELEPHONE ENCOUNTER
----- Message from Rohini Castro sent at 2/7/2019 11:21 AM CST -----  Contact: self @ [phone]  Pt would like to speak with someone about updating his medication list.  Pls call.

## 2019-02-19 ENCOUNTER — LAB VISIT (OUTPATIENT)
Dept: LAB | Facility: HOSPITAL | Age: 44
End: 2019-02-19
Attending: ANESTHESIOLOGY
Payer: COMMERCIAL

## 2019-02-19 ENCOUNTER — OFFICE VISIT (OUTPATIENT)
Dept: NEUROSURGERY | Facility: CLINIC | Age: 44
End: 2019-02-19
Payer: COMMERCIAL

## 2019-02-19 VITALS — SYSTOLIC BLOOD PRESSURE: 131 MMHG | HEART RATE: 86 BPM | DIASTOLIC BLOOD PRESSURE: 81 MMHG | TEMPERATURE: 98 F

## 2019-02-19 DIAGNOSIS — G40.219 LOCALIZATION-RELATED SYMPTOMATIC EPILEPSY AND EPILEPTIC SYNDROMES WITH COMPLEX PARTIAL SEIZURES, INTRACTABLE, WITHOUT STATUS EPILEPTICUS: Primary | ICD-10-CM

## 2019-02-19 DIAGNOSIS — F07.0 TEMPORAL LOBECTOMY BEHAVIOR SYNDROME: ICD-10-CM

## 2019-02-19 DIAGNOSIS — Z01.818 PREOPERATIVE TESTING: ICD-10-CM

## 2019-02-19 LAB
ANION GAP SERPL CALC-SCNC: 9 MMOL/L
BUN SERPL-MCNC: 12 MG/DL
CALCIUM SERPL-MCNC: 10.2 MG/DL
CHLORIDE SERPL-SCNC: 103 MMOL/L
CO2 SERPL-SCNC: 32 MMOL/L
CREAT SERPL-MCNC: 1 MG/DL
EST. GFR  (AFRICAN AMERICAN): >60 ML/MIN/1.73 M^2
EST. GFR  (NON AFRICAN AMERICAN): >60 ML/MIN/1.73 M^2
GLUCOSE SERPL-MCNC: 89 MG/DL
HCT VFR BLD AUTO: 49.1 %
HGB BLD-MCNC: 15.9 G/DL
POTASSIUM SERPL-SCNC: 4.5 MMOL/L
SODIUM SERPL-SCNC: 144 MMOL/L

## 2019-02-19 PROCEDURE — 99214 OFFICE O/P EST MOD 30 MIN: CPT | Mod: S$GLB,,, | Performed by: NEUROLOGICAL SURGERY

## 2019-02-19 PROCEDURE — 99999 PR PBB SHADOW E&M-EST. PATIENT-LVL II: CPT | Mod: PBBFAC,,, | Performed by: NEUROLOGICAL SURGERY

## 2019-02-19 PROCEDURE — 80048 BASIC METABOLIC PNL TOTAL CA: CPT

## 2019-02-19 PROCEDURE — 36415 COLL VENOUS BLD VENIPUNCTURE: CPT

## 2019-02-19 PROCEDURE — 99214 PR OFFICE/OUTPT VISIT, EST, LEVL IV, 30-39 MIN: ICD-10-PCS | Mod: S$GLB,,, | Performed by: NEUROLOGICAL SURGERY

## 2019-02-19 PROCEDURE — 99999 PR PBB SHADOW E&M-EST. PATIENT-LVL II: ICD-10-PCS | Mod: PBBFAC,,, | Performed by: NEUROLOGICAL SURGERY

## 2019-02-19 PROCEDURE — 85018 HEMOGLOBIN: CPT

## 2019-02-19 PROCEDURE — 85014 HEMATOCRIT: CPT

## 2019-02-19 RX ORDER — LORATADINE 10 MG/1
10 TABLET ORAL
COMMUNITY
Start: 2018-08-20 | End: 2020-06-10

## 2019-02-19 RX ORDER — CLOBAZAM 20 MG/1
20 TABLET ORAL
COMMUNITY
End: 2019-03-27

## 2019-02-19 RX ORDER — LACOSAMIDE 200 MG/1
200 TABLET ORAL
COMMUNITY
End: 2019-03-27

## 2019-02-19 RX ORDER — LAMOTRIGINE 200 MG/1
200 TABLET ORAL
COMMUNITY
End: 2019-03-27

## 2019-02-19 RX ORDER — LORAZEPAM 1 MG/1
1 TABLET ORAL
COMMUNITY
End: 2019-03-27

## 2019-02-19 NOTE — PROGRESS NOTES
Subjective:    I, Danielle Frey, attest that this documentation has been prepared under the direction and in the presence of YVETTE Saldana MD.     Patient ID: Nicholas Nagel is a 43 y.o. male.    Chief Complaint: No chief complaint on file.    HPI   Pt is a 43 y.o. male who presents with history of intractable epilepsy that had a previous left temporal lobectomy. Pt has failed 9 previous medications and is here to see us for planned placement of VNS, dated 2/21/2019. Pt states that he endures about 1 seizure/ month. Pt being followed by Dr. Urias.     Review of Systems   Constitutional: Negative for chills, diaphoresis, fatigue and fever.   HENT: Negative for congestion, rhinorrhea, sinus pressure, sneezing, sore throat and trouble swallowing.    Eyes: Negative.  Negative for visual disturbance.   Respiratory: Negative for cough, choking, chest tightness and shortness of breath.    Cardiovascular: Negative for chest pain.   Gastrointestinal: Negative for abdominal pain, diarrhea, nausea and vomiting.   Endocrine: Negative.    Genitourinary: Negative for dysuria.   Skin: Negative for color change, pallor, rash and wound.   Neurological: Positive for seizures. Negative for syncope.   Hematological: Does not bruise/bleed easily.   Psychiatric/Behavioral: Negative for confusion.       Objective:      Physical Exam:  Nursing note and vitals reviewed.    Constitutional: He appears well-developed.     Eyes: Pupils are equal, round, and reactive to light. Conjunctivae and EOM are normal.     Cardiovascular: Normal rate, regular rhythm, normal pulses and intact distal pulses.     Abdominal: Soft.     Psych/Behavior: He is alert. He is oriented to person, place, and time. He has a normal mood and affect.     Musculoskeletal: Gait is normal.        Neck: Range of motion is full. There is no tenderness. Muscle strength is 5/5. Tone is normal.        Back: Range of motion is full. There is no tenderness. Muscle strength is  5/5. Tone is normal.        Right Upper Extremities: Range of motion is full. There is no tenderness. Muscle strength is 5/5. Tone is normal.        Left Upper Extremities: Range of motion is full. There is no tenderness. Muscle strength is 5/5. Tone is normal.       Right Lower Extremities: Range of motion is full. There is no tenderness. Muscle strength is 5/5. Tone is normal.        Left Lower Extremities: Range of motion is full. There is no tenderness. Muscle strength is 5/5. Tone is normal.     Neurological:        Coordination: He has a normal Romberg Test, normal finger to nose coordination, normal heel to shin coordination and normal tandem walking coordination.        DTRs: DTRs are normal. Tricep reflexes are 2+ on the right side and 2+ on the left side. Bicep reflexes are 2+ on the right side and 2+ on the left side. Brachioradialis reflexes are 2+ on the right side and 2+ on the left side. Patellar reflexes are 2+ on the right side and 2+ on the left side. Achilles reflexes are 2+ on the right side and 2+ on the left side.        Cranial nerves: Cranial nerve(s) II, III, IV, V, VI, VII, VIII, IX, X, XI and XII are intact.       Pt is awake, alert, and appropriate.  Left temporal incision well healed.   No focal deficits.   No drift, no lag.  No dysmetria.     Imaging:   MRI Brain, date 2015 shows pretty significant left sided temporal lobectomy. Some gliosis around resection bed, but no other lesions seen.     IYVETTE MD, personally reviewed the imaging and interpreted independent of the radiology report.    Assessment/Plan:   Pt with hx of intractable epilepsy that has failed conservative management so far and has failed surgical temporal lobectomy. I think it is reasonable to try VNS. Family understands this is not a cure.    I have discussed the risks/benefits, indications, and alternatives for the proposed procedure in detail. I have answered all of their questions and patient wishes to proceed  with surgery. We will schedule patient.     I, YVETTE Saldana MD, personally performed the services described in this documentation. All medical record entries made by the scribe, Danielle Frey, were at my direction and in my presence.  I have reviewed the chart and agree that the record reflects my personal performance and is accurate and complete.

## 2019-02-21 ENCOUNTER — ANESTHESIA (OUTPATIENT)
Dept: SURGERY | Facility: HOSPITAL | Age: 44
End: 2019-02-21
Payer: COMMERCIAL

## 2019-02-21 ENCOUNTER — HOSPITAL ENCOUNTER (OUTPATIENT)
Facility: HOSPITAL | Age: 44
Discharge: HOME | End: 2019-02-21
Attending: NEUROLOGICAL SURGERY | Admitting: NEUROLOGICAL SURGERY
Payer: COMMERCIAL

## 2019-02-21 VITALS
SYSTOLIC BLOOD PRESSURE: 133 MMHG | RESPIRATION RATE: 20 BRPM | BODY MASS INDEX: 27.86 KG/M2 | HEART RATE: 82 BPM | TEMPERATURE: 98 F | DIASTOLIC BLOOD PRESSURE: 88 MMHG | WEIGHT: 199 LBS | OXYGEN SATURATION: 100 % | HEIGHT: 71 IN

## 2019-02-21 DIAGNOSIS — G40.909 EPILEPSY: Primary | ICD-10-CM

## 2019-02-21 LAB
ABO + RH BLD: NORMAL
ANION GAP SERPL CALC-SCNC: 12 MMOL/L
BASOPHILS # BLD AUTO: 0.03 K/UL
BASOPHILS NFR BLD: 0.7 %
BLD GP AB SCN CELLS X3 SERPL QL: NORMAL
BUN SERPL-MCNC: 13 MG/DL
CALCIUM SERPL-MCNC: 10.1 MG/DL
CHLORIDE SERPL-SCNC: 104 MMOL/L
CO2 SERPL-SCNC: 26 MMOL/L
CREAT SERPL-MCNC: 1 MG/DL
DIFFERENTIAL METHOD: ABNORMAL
EOSINOPHIL # BLD AUTO: 0.1 K/UL
EOSINOPHIL NFR BLD: 1.3 %
ERYTHROCYTE [DISTWIDTH] IN BLOOD BY AUTOMATED COUNT: 12.7 %
EST. GFR  (AFRICAN AMERICAN): >60 ML/MIN/1.73 M^2
EST. GFR  (NON AFRICAN AMERICAN): >60 ML/MIN/1.73 M^2
GLUCOSE SERPL-MCNC: 88 MG/DL
HCT VFR BLD AUTO: 46.4 %
HGB BLD-MCNC: 15.6 G/DL
IMM GRANULOCYTES # BLD AUTO: 0.03 K/UL
IMM GRANULOCYTES NFR BLD AUTO: 0.7 %
INR PPP: 1.1
LYMPHOCYTES # BLD AUTO: 1.2 K/UL
LYMPHOCYTES NFR BLD: 26.4 %
MCH RBC QN AUTO: 30.5 PG
MCHC RBC AUTO-ENTMCNC: 33.6 G/DL
MCV RBC AUTO: 91 FL
MONOCYTES # BLD AUTO: 0.3 K/UL
MONOCYTES NFR BLD: 7.1 %
NEUTROPHILS # BLD AUTO: 2.9 K/UL
NEUTROPHILS NFR BLD: 63.8 %
NRBC BLD-RTO: 0 /100 WBC
PLATELET # BLD AUTO: 191 K/UL
PMV BLD AUTO: 10.7 FL
POTASSIUM SERPL-SCNC: 4.3 MMOL/L
PROTHROMBIN TIME: 11.4 SEC
RBC # BLD AUTO: 5.12 M/UL
SODIUM SERPL-SCNC: 142 MMOL/L
WBC # BLD AUTO: 4.51 K/UL

## 2019-02-21 PROCEDURE — 63600175 PHARM REV CODE 636 W HCPCS: Performed by: NURSE ANESTHETIST, CERTIFIED REGISTERED

## 2019-02-21 PROCEDURE — 64568 PR IMPLANTATION, NEUROSTIM ELECT ARRAY & PULSE GEN, OPEN, CRANIAL NERVE: ICD-10-PCS | Mod: LT,,, | Performed by: NEUROLOGICAL SURGERY

## 2019-02-21 PROCEDURE — 80048 BASIC METABOLIC PNL TOTAL CA: CPT

## 2019-02-21 PROCEDURE — 71000039 HC RECOVERY, EACH ADD'L HOUR: Performed by: NEUROLOGICAL SURGERY

## 2019-02-21 PROCEDURE — 64568 OPN IMPLTJ CRNL NRV NEA&PG: CPT | Mod: LT,,, | Performed by: NEUROLOGICAL SURGERY

## 2019-02-21 PROCEDURE — 85025 COMPLETE CBC W/AUTO DIFF WBC: CPT

## 2019-02-21 PROCEDURE — 36000707: Performed by: NEUROLOGICAL SURGERY

## 2019-02-21 PROCEDURE — 85610 PROTHROMBIN TIME: CPT

## 2019-02-21 PROCEDURE — 27201423 OPTIME MED/SURG SUP & DEVICES STERILE SUPPLY: Performed by: NEUROLOGICAL SURGERY

## 2019-02-21 PROCEDURE — 25000003 PHARM REV CODE 250: Performed by: NURSE ANESTHETIST, CERTIFIED REGISTERED

## 2019-02-21 PROCEDURE — 94761 N-INVAS EAR/PLS OXIMETRY MLT: CPT

## 2019-02-21 PROCEDURE — 71000016 HC POSTOP RECOV ADDL HR: Performed by: NEUROLOGICAL SURGERY

## 2019-02-21 PROCEDURE — 63600175 PHARM REV CODE 636 W HCPCS: Performed by: ANESTHESIOLOGY

## 2019-02-21 PROCEDURE — 37000009 HC ANESTHESIA EA ADD 15 MINS: Performed by: NEUROLOGICAL SURGERY

## 2019-02-21 PROCEDURE — 37000008 HC ANESTHESIA 1ST 15 MINUTES: Performed by: NEUROLOGICAL SURGERY

## 2019-02-21 PROCEDURE — 63600175 PHARM REV CODE 636 W HCPCS: Performed by: STUDENT IN AN ORGANIZED HEALTH CARE EDUCATION/TRAINING PROGRAM

## 2019-02-21 PROCEDURE — D9220A PRA ANESTHESIA: Mod: ,,, | Performed by: ANESTHESIOLOGY

## 2019-02-21 PROCEDURE — 25000003 PHARM REV CODE 250: Performed by: NEUROLOGICAL SURGERY

## 2019-02-21 PROCEDURE — 71000015 HC POSTOP RECOV 1ST HR: Performed by: NEUROLOGICAL SURGERY

## 2019-02-21 PROCEDURE — 71000033 HC RECOVERY, INTIAL HOUR: Performed by: NEUROLOGICAL SURGERY

## 2019-02-21 PROCEDURE — 36000706: Performed by: NEUROLOGICAL SURGERY

## 2019-02-21 PROCEDURE — C1778 LEAD, NEUROSTIMULATOR: HCPCS | Performed by: NEUROLOGICAL SURGERY

## 2019-02-21 PROCEDURE — 86901 BLOOD TYPING SEROLOGIC RH(D): CPT

## 2019-02-21 PROCEDURE — 25000003 PHARM REV CODE 250: Performed by: STUDENT IN AN ORGANIZED HEALTH CARE EDUCATION/TRAINING PROGRAM

## 2019-02-21 PROCEDURE — D9220A PRA ANESTHESIA: ICD-10-PCS | Mod: ,,, | Performed by: ANESTHESIOLOGY

## 2019-02-21 PROCEDURE — C1767 GENERATOR, NEURO NON-RECHARG: HCPCS | Performed by: NEUROLOGICAL SURGERY

## 2019-02-21 DEVICE — LEAD PERENNIALFLEX 2MM 43CM: Type: IMPLANTABLE DEVICE | Site: NECK | Status: FUNCTIONAL

## 2019-02-21 DEVICE — GENERATOR ASPIRE VNS: Type: IMPLANTABLE DEVICE | Site: NECK | Status: FUNCTIONAL

## 2019-02-21 RX ORDER — HYDROCODONE BITARTRATE AND ACETAMINOPHEN 5; 325 MG/1; MG/1
1 TABLET ORAL EVERY 6 HOURS PRN
Qty: 30 TABLET | Refills: 0 | Status: SHIPPED | OUTPATIENT
Start: 2019-02-21 | End: 2020-06-10

## 2019-02-21 RX ORDER — ACETAMINOPHEN 10 MG/ML
INJECTION, SOLUTION INTRAVENOUS
Status: DISCONTINUED | OUTPATIENT
Start: 2019-02-21 | End: 2019-02-21

## 2019-02-21 RX ORDER — HYDROCODONE BITARTRATE AND ACETAMINOPHEN 5; 325 MG/1; MG/1
1 TABLET ORAL EVERY 4 HOURS PRN
Status: DISCONTINUED | OUTPATIENT
Start: 2019-02-21 | End: 2019-02-21 | Stop reason: HOSPADM

## 2019-02-21 RX ORDER — ONDANSETRON 2 MG/ML
4 INJECTION INTRAMUSCULAR; INTRAVENOUS DAILY PRN
Status: DISCONTINUED | OUTPATIENT
Start: 2019-02-21 | End: 2019-02-21 | Stop reason: HOSPADM

## 2019-02-21 RX ORDER — SODIUM CHLORIDE 9 MG/ML
INJECTION, SOLUTION INTRAVENOUS CONTINUOUS PRN
Status: DISCONTINUED | OUTPATIENT
Start: 2019-02-21 | End: 2019-02-21

## 2019-02-21 RX ORDER — SODIUM CHLORIDE 0.9 % (FLUSH) 0.9 %
3 SYRINGE (ML) INJECTION
Status: DISCONTINUED | OUTPATIENT
Start: 2019-02-21 | End: 2019-02-21 | Stop reason: HOSPADM

## 2019-02-21 RX ORDER — BACITRACIN 50000 [IU]/1
INJECTION, POWDER, FOR SOLUTION INTRAMUSCULAR
Status: DISCONTINUED | OUTPATIENT
Start: 2019-02-21 | End: 2019-02-21 | Stop reason: HOSPADM

## 2019-02-21 RX ORDER — BUPIVACAINE HYDROCHLORIDE AND EPINEPHRINE 5; 5 MG/ML; UG/ML
INJECTION, SOLUTION EPIDURAL; INTRACAUDAL; PERINEURAL
Status: DISCONTINUED | OUTPATIENT
Start: 2019-02-21 | End: 2019-02-21 | Stop reason: HOSPADM

## 2019-02-21 RX ORDER — MUPIROCIN 20 MG/G
1 OINTMENT TOPICAL
Status: DISCONTINUED | OUTPATIENT
Start: 2019-02-21 | End: 2019-02-21 | Stop reason: HOSPADM

## 2019-02-21 RX ORDER — HYDROMORPHONE HYDROCHLORIDE 2 MG/ML
INJECTION, SOLUTION INTRAMUSCULAR; INTRAVENOUS; SUBCUTANEOUS
Status: DISCONTINUED | OUTPATIENT
Start: 2019-02-21 | End: 2019-02-21

## 2019-02-21 RX ORDER — LORAZEPAM 2 MG/ML
2 INJECTION INTRAMUSCULAR
Status: DISCONTINUED | OUTPATIENT
Start: 2019-02-21 | End: 2019-02-21 | Stop reason: HOSPADM

## 2019-02-21 RX ORDER — CEPHALEXIN 500 MG/1
500 CAPSULE ORAL EVERY 8 HOURS
Qty: 21 CAPSULE | Refills: 0 | Status: SHIPPED | OUTPATIENT
Start: 2019-02-21 | End: 2019-02-28

## 2019-02-21 RX ORDER — ROCURONIUM BROMIDE 10 MG/ML
INJECTION, SOLUTION INTRAVENOUS
Status: DISCONTINUED | OUTPATIENT
Start: 2019-02-21 | End: 2019-02-21

## 2019-02-21 RX ORDER — LIDOCAINE HYDROCHLORIDE AND EPINEPHRINE 10; 10 MG/ML; UG/ML
INJECTION, SOLUTION INFILTRATION; PERINEURAL
Status: DISCONTINUED | OUTPATIENT
Start: 2019-02-21 | End: 2019-02-21 | Stop reason: HOSPADM

## 2019-02-21 RX ORDER — FENTANYL CITRATE 50 UG/ML
INJECTION, SOLUTION INTRAMUSCULAR; INTRAVENOUS
Status: DISCONTINUED | OUTPATIENT
Start: 2019-02-21 | End: 2019-02-21

## 2019-02-21 RX ORDER — LORAZEPAM 2 MG/ML
INJECTION INTRAMUSCULAR
Status: COMPLETED
Start: 2019-02-21 | End: 2019-02-21

## 2019-02-21 RX ORDER — NEOSTIGMINE METHYLSULFATE 1 MG/ML
INJECTION, SOLUTION INTRAVENOUS
Status: DISCONTINUED | OUTPATIENT
Start: 2019-02-21 | End: 2019-02-21

## 2019-02-21 RX ORDER — PROPOFOL 10 MG/ML
VIAL (ML) INTRAVENOUS
Status: DISCONTINUED | OUTPATIENT
Start: 2019-02-21 | End: 2019-02-21

## 2019-02-21 RX ORDER — GLYCOPYRROLATE 0.2 MG/ML
INJECTION INTRAMUSCULAR; INTRAVENOUS
Status: DISCONTINUED | OUTPATIENT
Start: 2019-02-21 | End: 2019-02-21

## 2019-02-21 RX ORDER — DEXAMETHASONE SODIUM PHOSPHATE 4 MG/ML
INJECTION, SOLUTION INTRA-ARTICULAR; INTRALESIONAL; INTRAMUSCULAR; INTRAVENOUS; SOFT TISSUE
Status: DISCONTINUED | OUTPATIENT
Start: 2019-02-21 | End: 2019-02-21

## 2019-02-21 RX ORDER — FENTANYL CITRATE 50 UG/ML
25 INJECTION, SOLUTION INTRAMUSCULAR; INTRAVENOUS EVERY 5 MIN PRN
Status: COMPLETED | OUTPATIENT
Start: 2019-02-21 | End: 2019-02-21

## 2019-02-21 RX ORDER — MUPIROCIN 20 MG/G
OINTMENT TOPICAL
Status: DISCONTINUED | OUTPATIENT
Start: 2019-02-21 | End: 2019-02-21 | Stop reason: HOSPADM

## 2019-02-21 RX ORDER — LIDOCAINE HCL/PF 100 MG/5ML
SYRINGE (ML) INTRAVENOUS
Status: DISCONTINUED | OUTPATIENT
Start: 2019-02-21 | End: 2019-02-21

## 2019-02-21 RX ORDER — LORAZEPAM 2 MG/ML
INJECTION INTRAMUSCULAR
Status: DISCONTINUED | OUTPATIENT
Start: 2019-02-21 | End: 2019-02-21

## 2019-02-21 RX ORDER — HYDROMORPHONE HYDROCHLORIDE 1 MG/ML
INJECTION, SOLUTION INTRAMUSCULAR; INTRAVENOUS; SUBCUTANEOUS
Status: DISCONTINUED
Start: 2019-02-21 | End: 2019-02-21 | Stop reason: HOSPADM

## 2019-02-21 RX ORDER — CEFAZOLIN SODIUM 1 G/3ML
2 INJECTION, POWDER, FOR SOLUTION INTRAMUSCULAR; INTRAVENOUS
Status: COMPLETED | OUTPATIENT
Start: 2019-02-21 | End: 2019-02-21

## 2019-02-21 RX ADMIN — PROPOFOL 200 MG: 10 INJECTION, EMULSION INTRAVENOUS at 11:02

## 2019-02-21 RX ADMIN — ONDANSETRON 4 MG: 2 INJECTION INTRAMUSCULAR; INTRAVENOUS at 12:02

## 2019-02-21 RX ADMIN — FENTANYL CITRATE 25 MCG: 50 INJECTION INTRAMUSCULAR; INTRAVENOUS at 01:02

## 2019-02-21 RX ADMIN — FENTANYL CITRATE 50 MCG: 50 INJECTION, SOLUTION INTRAMUSCULAR; INTRAVENOUS at 11:02

## 2019-02-21 RX ADMIN — LORAZEPAM 2 MG: 2 INJECTION INTRAMUSCULAR; INTRAVENOUS at 11:02

## 2019-02-21 RX ADMIN — PROPOFOL 50 MG: 10 INJECTION, EMULSION INTRAVENOUS at 11:02

## 2019-02-21 RX ADMIN — SODIUM CHLORIDE, SODIUM GLUCONATE, SODIUM ACETATE, POTASSIUM CHLORIDE, MAGNESIUM CHLORIDE, SODIUM PHOSPHATE, DIBASIC, AND POTASSIUM PHOSPHATE: .53; .5; .37; .037; .03; .012; .00082 INJECTION, SOLUTION INTRAVENOUS at 12:02

## 2019-02-21 RX ADMIN — NEOSTIGMINE METHYLSULFATE 3 MG: 1 INJECTION INTRAVENOUS at 12:02

## 2019-02-21 RX ADMIN — ROCURONIUM BROMIDE 30 MG: 10 INJECTION, SOLUTION INTRAVENOUS at 11:02

## 2019-02-21 RX ADMIN — FENTANYL CITRATE 25 MCG: 50 INJECTION INTRAMUSCULAR; INTRAVENOUS at 02:02

## 2019-02-21 RX ADMIN — ACETAMINOPHEN 1000 MG: 10 INJECTION, SOLUTION INTRAVENOUS at 11:02

## 2019-02-21 RX ADMIN — SODIUM CHLORIDE: 0.9 INJECTION, SOLUTION INTRAVENOUS at 10:02

## 2019-02-21 RX ADMIN — ROCURONIUM BROMIDE 20 MG: 10 INJECTION, SOLUTION INTRAVENOUS at 11:02

## 2019-02-21 RX ADMIN — CEFAZOLIN 2 G: 330 INJECTION, POWDER, FOR SOLUTION INTRAMUSCULAR; INTRAVENOUS at 11:02

## 2019-02-21 RX ADMIN — HYDROMORPHONE HYDROCHLORIDE 0.4 MG: 2 INJECTION, SOLUTION INTRAMUSCULAR; INTRAVENOUS; SUBCUTANEOUS at 01:02

## 2019-02-21 RX ADMIN — DEXAMETHASONE SODIUM PHOSPHATE 8 MG: 4 INJECTION, SOLUTION INTRAMUSCULAR; INTRAVENOUS at 11:02

## 2019-02-21 RX ADMIN — LIDOCAINE HYDROCHLORIDE 100 MG: 20 INJECTION, SOLUTION INTRAVENOUS at 11:02

## 2019-02-21 RX ADMIN — HYDROCODONE BITARTRATE AND ACETAMINOPHEN 1 TABLET: 5; 325 TABLET ORAL at 01:02

## 2019-02-21 RX ADMIN — GLYCOPYRROLATE 0.4 MG: 0.2 INJECTION, SOLUTION INTRAMUSCULAR; INTRAVENOUS at 12:02

## 2019-02-21 NOTE — BRIEF OP NOTE
[facility]  Brief Operative Note    SUMMARY     Surgery Date: 2/21/2019     Surgeon(s) and Role:     * Teddy Saldana MD - Primary     * Jean West MD - Resident - Assisting     * John Ash MD - Resident - Assisting        Pre-op Diagnosis:  Localization-related (focal) (partial) symptomatic epilepsy and epileptic syndromes with complex partial seizures, intractable, without status epilepticus [G40.219]    Post-op Diagnosis:  Post-Op Diagnosis Codes:     * Localization-related (focal) (partial) symptomatic epilepsy and epileptic syndromes with complex partial seizures, intractable, without status epilepticus [G40.219]    Procedure(s) (LRB):  INSERTION, VAGUS NERVE STIMULATOR (Left)    Anesthesia: General    Description of Procedure: vagal nerve stimulator placement    Description of the findings of the procedure: hardware testing all normal    Estimated Blood Loss: * No values recorded between 2/21/2019 11:57 AM and 2/21/2019  1:12 PM *         Specimens:   Specimen (12h ago, onward)    None

## 2019-02-21 NOTE — TRANSFER OF CARE
"Anesthesia Transfer of Care Note    Patient: Nicholas Nagel Jr.    Procedure(s) Performed: Procedure(s) (LRB):  INSERTION, VAGUS NERVE STIMULATOR (Left)    Patient location: PACU    Anesthesia Type: general    Transport from OR: Transported from OR on 6-10 L/min O2 by face mask with adequate spontaneous ventilation    Post pain: adequate analgesia    Post assessment: no apparent anesthetic complications    Post vital signs: stable    Level of consciousness: awake and alert    Nausea/Vomiting: no nausea/vomiting    Complications: none    Transfer of care protocol was followed      Last vitals:   Visit Vitals  /86   Pulse 104   Temp 37.1 °C (98.8 °F) (Oral)   Resp 12   Ht 5' 11" (1.803 m)   Wt 90.3 kg (199 lb)   SpO2 98%   BMI 27.75 kg/m²     "

## 2019-02-21 NOTE — DISCHARGE SUMMARY
[facility]  Discharge Note  Short Stay    Admit Date: 2/21/2019    Discharge Date and Time: No discharge date for patient encounter.     Attending Physician: Teddy Saldana MD     Discharge Provider: John Ash    Diagnoses:  Active Hospital Problems    Diagnosis  POA    *Epilepsy [G40.909]  Yes      Resolved Hospital Problems   No resolved problems to display.       Discharged Condition: stable    Hospital Course: Patient was admitted for an outpatient VNS placement and tolerated the procedure well with no complications.    Final Diagnoses: Same as principal problem.    Disposition: Home or Self Care    Follow up/Patient Instructions:    Medications:  Reconciled Home Medications:      Medication List      START taking these medications    cephALEXin 500 MG capsule  Commonly known as:  KEFLEX  Take 1 capsule (500 mg total) by mouth every 8 (eight) hours. for 7 days     HYDROcodone-acetaminophen 5-325 mg per tablet  Commonly known as:  NORCO  Take 1 tablet by mouth every 6 (six) hours as needed for Pain.        CONTINUE taking these medications    * ONFI 20 mg Tab  Generic drug:  cloBAZam  Take 20 mg by mouth.     * cloBAZam 20 mg Tab  Commonly known as:  ONFI  Take 2 tablets (40 mg total) by mouth 2 (two) times daily.     FLUARIX QUAD 9922-2916 (PF) 60 mcg (15 mcg x 4)/0.5 mL Syrg vaccine  Generic drug:  influenza     * lamoTRIgine 200 MG tablet  Commonly known as:  LAMICTAL  Take 200 mg by mouth.     * lamoTRIgine 200 MG tablet  Commonly known as:  LAMICTAL  Take 1.5 tablets (300 mg total) by mouth 2 (two) times daily.     loratadine 10 mg tablet  Commonly known as:  CLARITIN  Take 10 mg by mouth.     * LORazepam 1 MG tablet  Commonly known as:  ATIVAN  Take 1 mg by mouth.     * LORazepam 1 MG tablet  Commonly known as:  ATIVAN  Take 1 tablet (1 mg total) by mouth every 12 (twelve) hours as needed (seizure).     multivitamin per tablet  Commonly known as:  THERAGRAN  Take 1 tablet by mouth once  daily.     * FYCOMPA 2 mg Tab  Generic drug:  perampanel  Take 2 mg by mouth.     * perampanel 2 mg Tab  Commonly known as:  FYCOMPA  Take 3 tablets (6 mg total) by mouth once daily.     * VIMPAT 200 mg Tab tablet  Generic drug:  lacosamide  Take 200 mg by mouth.     * VIMPAT 200 mg Tab tablet  Generic drug:  lacosamide  Take 1.5 tablets (300 mg total) by mouth 2 (two) times daily.         * This list has 10 medication(s) that are the same as other medications prescribed for you. Read the directions carefully, and ask your doctor or other care provider to review them with you.              Discharge Procedure Orders   Notify your health care provider if you experience any of the following:  persistent dizziness, light-headedness, or visual disturbances     Notify your health care provider if you experience any of the following:  increased confusion or weakness     Notify your health care provider if you experience any of the following:  worsening rash     Notify your health care provider if you experience any of the following:  severe persistent headache     Notify your health care provider if you experience any of the following:  difficulty breathing or increased cough     Notify your health care provider if you experience any of the following:  redness, tenderness, or signs of infection (pain, swelling, redness, odor or green/yellow discharge around incision site)     Notify your health care provider if you experience any of the following:  severe uncontrolled pain     Notify your health care provider if you experience any of the following:  persistent nausea and vomiting or diarrhea     Notify your health care provider if you experience any of the following:  temperature >100.4     No dressing needed     Activity as tolerated     Follow-up Information     [facility] 7th Fl In 2 weeks.    Specialty:  Neurosurgery  Why:  For wound re-check  Contact information:  [address]  [place]  [place] [id number] [phone]  Additional information:  7th Floor                 Discharge Procedure Orders (must include Diet, Follow-up, Activity):   Discharge Procedure Orders (must include Diet, Follow-up, Activity)   Notify your health care provider if you experience any of the following:  persistent dizziness, light-headedness, or visual disturbances     Notify your health care provider if you experience any of the following:  increased confusion or weakness     Notify your health care provider if you experience any of the following:  worsening rash     Notify your health care provider if you experience any of the following:  severe persistent headache     Notify your health care provider if you experience any of the following:  difficulty breathing or increased cough     Notify your health care provider if you experience any of the following:  redness, tenderness, or signs of infection (pain, swelling, redness, odor or green/yellow discharge around incision site)     Notify your health care provider if you experience any of the following:  severe uncontrolled pain     Notify your health care provider if you experience any of the following:  persistent nausea and vomiting or diarrhea     Notify your health care provider if you experience any of the following:  temperature >100.4     No dressing needed     Activity as tolerated

## 2019-02-21 NOTE — H&P
History of Present Illness:  Nicholas Nagel is a 43 y.o. male with complex partial epilepsy who presents as a referral from Dr. Urias for surgical consultation for vagal nerve stimulator placement. He is s/p left temporal lobe resection. Increased seizure frequency of CPS noted in Dr. Urias's note in September.     EEG Results:  Had continuous EEG in 2014 nothing abnormal.     Current medications:  clobezam (Onfi or Frizium, CLB):  40 mg bid  lacosamide (Vimpat, LCS) :  200 mg bid  lamotrigine (Lamictal, LTG)   600 mg bid     Failed medications:  carbamazepine (Tegretol, CBZ):        tried in the past  ethosuximide (Zarontin, ESM):           tried in the past  felbamate (felbatol, FBM):                  tried in the past  gabapentin (Neurontin, GPN):            tried in the past  levetiracetam (Keppra, LEV):                         tried in the past  phenytoin (Dilantin, PHT):                  tried in the past  primidone (Mysoline, PRM):               tried in the past  topiramate (Topamax, TPM):              tried in the past  valproic acid (Depakote, VPA):          tried in the past   clorazepate (Tranxene, CLZ):            Stopped previous visits.            Review of patient's allergies indicates:  No Known Allergies          Past Medical History:   Diagnosis Date    Epilepsy 1981               Past Surgical History:   Procedure Laterality Date    epilepsy surgery Left 2008               Family History   Problem Relation Age of Onset    Diabetes Father      Cancer Father      Diabetes Sister      Cancer Paternal Aunt      Diabetes Paternal Uncle      Cancer Paternal Grandmother      Diabetes Paternal Grandfather      Cancer Paternal Grandfather           Social History               Socioeconomic History    Marital status: Single       Spouse name: Not on file    Number of children: Not on file    Years of education: Not on file    Highest education level: Not on file   Social Needs     Financial resource strain: Not on file    Food insecurity - worry: Not on file    Food insecurity - inability: Not on file    Transportation needs - medical: Not on file    Transportation needs - non-medical: Not on file   Occupational History    Not on file   Tobacco Use    Smoking status: Never Smoker   Substance and Sexual Activity    Alcohol use: No    Drug use: No    Sexual activity: Not on file   Other Topics Concern    Not on file   Social History Narrative    Not on file            Review of Systems:  Constitutional: no fever, chills or night sweats. No changes in weight   Eyes: no visual changes   ENT: no nasal congestion or sore throat   Respiratory: no cough or shortness of breath   Cardiovascular: no chest pain or palpitations   Gastrointestinal: no nausea or vomiting   Genitourinary: no hematuria or dysuria   Integument/Breast: no rash or pruritis   Hematologic/Lymphatic: no easy bruising or lymphadenopathy   Musculoskeletal: no arthralgias or myalgias.   Neurological: no seizures or tremors   Behavioral/Psych: no auditory or visual hallucinations   Endocrine: no heat or cold intolerance      OBJECTIVE:        Physical Exam:  General: well developed, well nourished, no distress.   Head: normocephalic, atraumatic  Neurologic: Alert and oriented. Thought content appropriate.  GCS: Motor: 6/Verbal: 5/Eyes: 4 GCS Total: 15  Mental Status: Awake, Alert, Oriented x 4  Language: No aphasia  Speech: No dysarthria  Cranial nerves: face symmetric, tongue midline, CN II-XII grossly intact.   Eyes: pupils equal, round, reactive to light with accomodation, EOMI.  Pulmonary: normal respirations, no signs of respiratory distress  Abdomen: soft, non-distended, not tender to palpation  Sensory: intact to light touch throughout     Motor Strength: Moves all extremities spontaneously with good tone.  Full strength upper and lower extremities. No abnormal movements seen.      DTR's: 2 + and symmetric in UE and  LE  Pronator Drift: no drift noted  Finger-to-nose: Intact bilaterally  Babinski: absent  Pulses: 2+ and symmetric radial and dorsalis pedis.  Skin: Skin is warm, dry and intact.     Diagnostic Results:  None.     ASSESSMENT/PLAN:      Nicholas Nagel is a 43 y.o. male who presents for surgical consultation for vagal nerve stimulator placement.     -proceed with surgery as planned

## 2019-02-21 NOTE — DISCHARGE INSTRUCTIONS
Discharge Instructions: Caring for Your Incision  You are going home with stitches (sutures), surgical staples, special strips of surgical tape called Steri-Strips, or surgical skin glue. One of these items was used to close your incision, help stop bleeding, and speed healing. Follow the tips on this sheet to help your incision heal.  Home care  · Always wash your hands before touching your incision.  · Keep your incision clean and dry.  · Avoid doing things that could cause dirt or sweat to get on your incision.  · Dont pick at scabs. They help protect the wound.  · Keep your incision out of water.  · Take a sponge bath to avoid getting your incision wet, unless your healthcare provider tells you otherwise.  · Ask your provider when can you take a shower or bathe.  · Ask your provider about the best way to keep your incision dry when bathing or showering.  · Pat sutures dry if they get wet. Dont rub.  · Leave the dressing (bandage) in place until you are told to remove it or change it. Change it only as directed, using clean hands.  · After the first 12 hours, change your dressing every 24 hours, or as directed by your healthcare provider.  · Change your dressing if it gets wet or soiled.  Care for specific closures  Follow these guidelines unless your child's healthcare provider tells you otherwise:  Skin glue. Dont put liquid, ointment, or cream on your incision or wound while the glue is in place. Avoid activities that cause heavy sweating. Protect the incision or wound from sunlight. Do not scratch, rub, or pick at the glue. Do not put tape directly over the glue. The glue should peel off within 5 to 10 days.  Follow-up care  Follow up with your healthcare provider to ask how long sutures or staples should be left in place. Be sure to return for suture or staple removal as directed. If dissolving stitches were used in your mouth, these will not need to be removed. They should fall out or dissolve on their  own.  If tape closures were used, remove them yourself when your provider recommends if they have not fallen off on their own. If skin glue was used, the glue will wear off by itself.      When to seek medical care  Call your healthcare provider right away if you have any of the following:  · More pain, redness, swelling, bleeding, or foul-smelling discharge around the incision area  · Fever of 100.4°F (38°C) or higher, or as directed by your healthcare provider  · Shaking chills  · Vomiting or nausea that doesnt go away  · Numbness, coldness, or tingling around the incision area, or changes in skin color  · Opening of the sutures or wound  · Stitches or staples come apart or fall out or surgical tape falls off before 7 days, or as directed by your provider   Date Last Reviewed: 10/22/2014  © 3813-4188 [employer]. [address] [place] [address]. All rights reserved. This information is not intended as a substitute for professional medical care. Always follow your healthcare professional's instructions.        Anesthesia: General Anesthesia     You are watched continuously during your procedure by your anesthesia provider.     Youre due to have surgery. During surgery, youll be given medicine called anesthesia or anesthetic. This will keep you comfortable and pain-free. Your anesthesia provider will use general anesthesia.  What is general anesthesia?  General anesthesia puts you into a state like deep sleep. It goes into the bloodstream (IV anesthetics), into the lungs (gas anesthetics), or both. You feel nothing during the procedure. You will not remember it. During the procedure, the anesthesia provider monitors you continuously. He or she checks your heart rate and rhythm, blood pressure, breathing, and blood oxygen.  · IV anesthetics. IV anesthetics are given through an IV line in your arm. Theyre often given first. This is so you are asleep before a gas anesthetic is started. Some  kinds of IV anesthetics relieve pain. Others relax you. Your doctor will decide which kind is best in your case.  · Gas anesthetics. Gas anesthetics are breathed into the lungs. They are often used to keep you asleep. They can be given through a facemask or a tube placed in your larynx or trachea (breathing tube).  ¨ If you have a facemask, your anesthesia provider will most likely place it over your nose and mouth while youre still awake. Youll breathe oxygen through the mask as your IV anesthetic is started. Gas anesthetic may be added through the mask.  ¨ If you have a tube in the larynx or trachea, it will be inserted into your throat after youre asleep.  Anesthesia tools and medicines  You will likely have:  · IV anesthetics. These are put into an IV line into your bloodstream.  · Gas anesthetics. You breathe these anesthetics into your lungs, where they pass into your bloodstream.  · Pulse oximeter. This is a small clip that is attached to the end of your finger. This measures your blood oxygen level.  · Electrocardiography leads (electrodes). These are small sticky pads that are placed on your chest. They record your heart rate and rhythm.  · Blood pressure cuff. This reads your blood pressure.  Risks and possible complications  General anesthesia has some risks. These include:  · Breathing problems  · Nausea and vomiting  · Sore throat or hoarseness (usually temporary)  · Allergic reaction to the anesthetic  · Irregular heartbeat (rare)  · Cardiac arrest (rare)   Anesthesia safety  · Follow all instructions you are given for how long not to eat or drink before your procedure.  · Be sure your doctor knows what medicines and drugs you take. This includes over-the-counter medicines, herbs, supplements, alcohol or other drugs. You will be asked when those were last taken.  · Have an adult family member or friend drive you home after the procedure.  · For the first 24 hours after your surgery:  ¨ Do not drive  or use heavy equipment.  ¨ Do not make important decisions or sign legal documents. If important decisions or signing legal documents is necessary during the first 24 hours after surgery, have a trusted family member or spouse act on your behalf.  ¨ Avoid alcohol.  ¨ Have a responsible adult stay with you. He or she can watch for problems and help keep you safe.  Date Last Reviewed: 12/1/2016  © 9781-2184 [employer]. [address] [place] [address], [place] [address]. All rights reserved. This information is not intended as a substitute for professional medical care. Always follow your healthcare professional's instructions.

## 2019-02-22 NOTE — ANESTHESIA POSTPROCEDURE EVALUATION
"Anesthesia Post Evaluation    Patient: Nicholas Nagel Jr.    Procedure(s) Performed: Procedure(s) (LRB):  INSERTION, VAGUS NERVE STIMULATOR (Left)    Final Anesthesia Type: general  Patient location during evaluation: PACU  Patient participation: Yes- Able to Participate  Level of consciousness: awake and alert  Post-procedure vital signs: reviewed and stable  Pain management: adequate  Airway patency: patent  PONV status at discharge: No PONV  Anesthetic complications: no      Cardiovascular status: hemodynamically stable  Respiratory status: unassisted, spontaneous ventilation and room air  Hydration status: euvolemic  Follow-up not needed.        Visit Vitals  /88 (BP Location: Left arm, Patient Position: Lying)   Pulse 82   Temp 36.5 °C (97.7 °F) (Temporal)   Resp 20   Ht 5' 11" (1.803 m)   Wt 90.3 kg (199 lb)   SpO2 100%   BMI 27.75 kg/m²       Pain/Phylicia Score: Pain Rating Prior to Med Admin: 5 (2/21/2019  2:05 PM)  Phylicia Score: 9 (2/21/2019  2:20 PM)        "

## 2019-02-25 ENCOUNTER — TELEPHONE (OUTPATIENT)
Dept: NEUROSURGERY | Facility: CLINIC | Age: 44
End: 2019-02-25

## 2019-02-25 NOTE — OP NOTE
[facility]  Neurosurgery  Operative Note    SUMMARY      Date of Procedure: 2/21/2019     Procedure: Procedure(s) (LRB):  INSERTION, VAGUS NERVE STIMULATOR (Left)     Surgeon(s) and Role:     * Teddy Saldana MD - Primary     * Jean West MD - Resident - Assisting     * John Ash MD - Resident - Assisting        Pre-Operative Diagnosis: Localization-related (focal) (partial) symptomatic epilepsy and epileptic syndromes with complex partial seizures, intractable, without status epilepticus [G40.219]    Post-Operative Diagnosis: Post-Op Diagnosis Codes:     * Localization-related (focal) (partial) symptomatic epilepsy and epileptic syndromes with complex partial seizures, intractable, without status epilepticus [G40.219]    Anesthesia: General     Indication for procedure:  This is a 43-year-old male with history of complex partial epilepsy that has failed medical therapy and was felt to benefit from a vagal nerve stimulator placement    Description of the Findings of the Procedure:  Operative note:  The patient was taken out into the operating Room anesthetized intubated by anesthesia.  Was placed in a supine position neck slightly extended.  Head turn to the right.  The neck was prepped and draped in sterile fashion.  A transverse incision was carried out in the neck crease at the mid neck area.  We also made a separate incision in the chest area just below the clavicle from the pulse generator.  Once we made a pocket for the pulse generator we then turned our attention back to the neck.  We found the medial border of the sternocleidomastoid.  Dissected medial to this found the carotid sheath and its contents.  We opened up the carotid sheath found the vagus nerve between the carotid artery and the jugular vein.  We brought in the microscope and microsurgical technique we were able to dissect free the vagus nerve placed a vessel loop around it.  Then we passed the lead from the  chest into the neck, leaving enough slack.  We then used microsurgical technique to wrap the 3 leads of the vagus nerve stimulator leads around the vagus nerve.  We then connected to the pulse generator and tested the lead and the lead tested well.  We then programmed it to the lowest setting as specified by the epilepsy team.  We placed several retention stitches in the neck after placing 2 retention loops.  We then washed out the neck and the chest and closed both wounds in layers.  A sterile dressing was put in place.  Patient was extubated brought to recovery room without any positive complication.  EBL was minimal.  No specimens were sent.

## 2019-02-25 NOTE — TELEPHONE ENCOUNTER
----- Message from Radha Roca sent at 2/25/2019 10:41 AM CST -----  Contact: pt @ [phone]  Calling to speak with someone in Dr. Saldana's office regarding his surgery. Please call.

## 2019-02-27 ENCOUNTER — PATIENT MESSAGE (OUTPATIENT)
Dept: NEUROSURGERY | Facility: CLINIC | Age: 44
End: 2019-02-27

## 2019-02-28 ENCOUNTER — TELEPHONE (OUTPATIENT)
Dept: NEUROSURGERY | Facility: CLINIC | Age: 44
End: 2019-02-28

## 2019-02-28 NOTE — TELEPHONE ENCOUNTER
Wanted to let us know Nicholas went back to work today, starting with a half day today and tomorrow and go from there. They have signed up for my chart and are excited about the video visit thursday

## 2019-02-28 NOTE — TELEPHONE ENCOUNTER
----- Message from Mariann Sandhu sent at 2/28/2019  8:11 AM CST -----  Contact: pt father Nicholas hCávez  Type:  Needs Medical Advice    Who Called: Nicholas Chávez  Symptoms (please be specific):   How long has patient had these symptoms:     Pharmacy name and phone #:  Would the patient rather a call back or a response via [facility]? Call back  Best Call Back Number: [phone] or [phone]  Additional Information: Pt Father wants to speak with Dianne about retuning back to work

## 2019-03-04 ENCOUNTER — TELEPHONE (OUTPATIENT)
Dept: NEUROLOGY | Facility: CLINIC | Age: 44
End: 2019-03-04

## 2019-03-04 NOTE — TELEPHONE ENCOUNTER
Sent letter and form back to Dentist.Received message that they were not satisfied with letter.Refaxed form and completed requested information and instructed them to please refer to letter for more orders as well.

## 2019-03-07 ENCOUNTER — CLINICAL SUPPORT (OUTPATIENT)
Dept: NEUROSURGERY | Facility: CLINIC | Age: 44
End: 2019-03-07
Payer: COMMERCIAL

## 2019-03-07 NOTE — PROGRESS NOTES
Patient seen via video visit  for 2 week post op s/p VNS with Dr Saldana on _02/21/2019         Incision on anterior neck and chest assessed, dermabond used for closure no swelling or purulent drainage, edges well approximated.         Patient was instructed as follows:    Discontinue Bacitracin after tonight.   May shower normally but pat dry after shower.   Do not submerge wound in bath tub or go swimming until released by the physician   Keep incision clean, dry and open to air as much as possible.   Patient encouraged to walk as much as possible but advised to walk with family member or friend and rest as necessary.  No lifting >10lbs with left extremity  A copy of post-operative instructions provided to the patient.    All questions were answered. Patient will follow up with Dr Saldana 05/14/2019  . Patient was encouraged to call clinic with any future concerns prior to follow up appt. If any worsening symptoms, patient should report to ED.       Dianne Doty RN, BSN  Neurosurgery

## 2019-03-27 ENCOUNTER — OFFICE VISIT (OUTPATIENT)
Dept: NEUROLOGY | Facility: CLINIC | Age: 44
End: 2019-03-27
Payer: COMMERCIAL

## 2019-03-27 VITALS
HEART RATE: 90 BPM | WEIGHT: 203.06 LBS | HEIGHT: 71 IN | DIASTOLIC BLOOD PRESSURE: 84 MMHG | SYSTOLIC BLOOD PRESSURE: 129 MMHG | BODY MASS INDEX: 28.43 KG/M2

## 2019-03-27 DIAGNOSIS — Z96.89 S/P PLACEMENT OF VNS (VAGUS NERVE STIMULATION) DEVICE: ICD-10-CM

## 2019-03-27 DIAGNOSIS — G40.219 LOCALIZATION-RELATED SYMPTOMATIC EPILEPSY AND EPILEPTIC SYNDROMES WITH COMPLEX PARTIAL SEIZURES, INTRACTABLE, WITHOUT STATUS EPILEPTICUS: Primary | ICD-10-CM

## 2019-03-27 PROCEDURE — 99999 PR PBB SHADOW E&M-EST. PATIENT-LVL III: CPT | Mod: PBBFAC,,, | Performed by: PHYSICIAN ASSISTANT

## 2019-03-27 PROCEDURE — 3008F PR BODY MASS INDEX (BMI) DOCUMENTED: ICD-10-PCS | Mod: S$GLB,,, | Performed by: PHYSICIAN ASSISTANT

## 2019-03-27 PROCEDURE — 99999 PR PBB SHADOW E&M-EST. PATIENT-LVL III: ICD-10-PCS | Mod: PBBFAC,,, | Performed by: PHYSICIAN ASSISTANT

## 2019-03-27 PROCEDURE — 95976 PR ELEC ANALYSIS, IMPLT NEURO PULSE GEN, W/PRGRM, SMPL CRAN NERVE: ICD-10-PCS | Mod: S$GLB,,, | Performed by: PHYSICIAN ASSISTANT

## 2019-03-27 PROCEDURE — 99213 PR OFFICE/OUTPT VISIT, EST, LEVL III, 20-29 MIN: ICD-10-PCS | Mod: 25,S$GLB,, | Performed by: PHYSICIAN ASSISTANT

## 2019-03-27 PROCEDURE — 99213 OFFICE O/P EST LOW 20 MIN: CPT | Mod: 25,S$GLB,, | Performed by: PHYSICIAN ASSISTANT

## 2019-03-27 PROCEDURE — 95976 ALYS SMPL CN NPGT PRGRMG: CPT | Mod: S$GLB,,, | Performed by: PHYSICIAN ASSISTANT

## 2019-03-27 PROCEDURE — 3008F BODY MASS INDEX DOCD: CPT | Mod: S$GLB,,, | Performed by: PHYSICIAN ASSISTANT

## 2019-03-27 NOTE — PROGRESS NOTES
Name: Nicholas Nagel Jr.  MRN: [id number]   CSN: [id number]      Date: 03/27/2019    HISTORY OF PRESENT ILLNESS (HPI)  The patient is a 43 y.o. yo RHBM   2019/03/27  VNS implanted 2/21/19, tolerated procedure well. He was seen by NSGY in a virtual visit 3/7/19 for his 2 week post-op appointment, incision clean/dry/intact and well approximated at that time, and on exam today. Patient/family report no recent seizures. Continues to take Lacosamide, Clobazam, Lamotrigine, and Perampanel as prescribed. Presents today for first VNS adjustment.     2018/09/13  Patient reports 3 mild CPS since the last visit.  Last visit I gave him a schedule to increase lacosamide in two steps to 200 mg 1½ BID.  When he reached this dose he became dizzy and reduced the morning dose to 1 tab with resolution of the dizzinesss.  Last visit I discussed VNS treatment and today the patient wants to proceed with implanting the device.  He has no other medical problems except allergies.  He does take loratadine which works on the H1 reception similar to Benadryl but has not been been implicated in lowering seizure threshhold.      2018/01/26  The patient reports 3 sz since the last visit.  The most recent may have resulted from missed dose.  No triggers evident for the other 2 sz.  The patient reports he has an aura but can not describe what he perceives.      2017/07/13  In the past 6 months the patient has experienced five seizures (about 1 per month).   The patient was having difficulty expressing himself - speech is halting and disconnected.  He has been taking ativan prior to social event or watching sports to prevent seizure.  He has been taking ativan about 2 times a month.      2016/04/19 and 2017/01/23   Since the last visit the patient has experienced one seizures (12 Jan 2017) .  Meds were increased last time and he is tolerating the new dosages well.  There was no obvious provocative reason.  He has a good strategy for compliance.   He had no concurrent medical problem and had not taken benadryl or other proconvulsive medication.      Results for GEORGETTE MONCADA (MRN [id number]) as of 1/23/2017 14:26   Ref. Range 4/1/2015 13:20 8/5/2015 14:05 8/21/2015 11:40   Lamotrigine Lvl Latest Ref Range: 2.0 - 15.0 ug/mL 13.4  17.3 (H)   MRI PREVIOUS Unknown  Rpt    Lacosamide Latest Ref Range: 1.0 - 10.0 mcg/mL 8.0           pt has had the following episodes:  2015/4/8 - Pt had just finished a walk.  Eye blinking and stereotypic hand movements.  No generalized convulsions were noted. Didn't lose consciousness.  Lasted 30s to 1 minute.  2015/6/6 - Pt was in his room.  Came to his dad to give him his phone to record what was happening.  Said he has the feeling that he's about to have a seizure.  Says there is no aura per se.  Says a thought that he's about to have a seizure pops into his head, which usually precedes his events.  Said his speech got markedly worse during the event.  Eye blinking lasted 30s to 1 minute and resolved spontaneously.  Also had stereotypic hand movements and eye blinking.  Maintained consciousness throughout episode.  Pt had no memory of the seizure, which was relayed by his dad.    2015/6/8 - Pt was running around a track.  Said he overexerted himself over the last three laps.  Said he had another event immediately after exercise.  Again, this episode was brief (30s-1min) and was characterized by walking in circles, eye blinking, and stereotypic hand motions of rubbing his fingers. Had no intraictal recall.      Previous History (2015/4/1)   The patient was referred for consultation by Dr. Guerrero due to want to transfer care here due to long distance, patient is living [facility].  The patient was accompanied by his mother and father who provided some of the history.      Seizure surgery 2008 for epilepsy. Since then, less frequent and not as strong, still have seizure twice a month with current medication dosage. He is  compliant with medication, tolerates medication well, no side effect noticed. On current regime for about 5 years, change to onfi 2-3 years ago in place of keppra.       Epilepsy History  ED visits: last year, feel someone talking to him  Episodes of SE  Change in meds    Seizure Seminology  Seizure Type 1  Classification:   Aura - feel it is coming, his mind told him that seizure is coming  Ictus  - Nonconv -  - Conv - whole body jerking more on the right side, after a couple of jerk, he is out, first started has tongue biting and incontinence  - Duration - 2 minutes  Post-ictal  - Symptoms: can not talk, sleepiness no headache  - Duration: 5-10 minutes  Age of onset : 6 year old  Current Seizure Frequency -  Initially once week,  Last Seizure: 10 years ago    Seizure Type 2: after seizure  Classification:   Aura - feel it is coming, but not as strong as before  Ictus  - Nonconv -  - Conv - blinking and body jerk more on the arm.   - Duration - 30 sec  Post-ictal  - Symptoms: no sleepiness  - Duration: back to baseline after ictus right away  Age of onset : 29  Current Seizure Frequency -  Last Seizure: a couple weeks ago    sz per month 2012 2013 2014 2015 2016 2017   Doug         Feb         Mar         Apr    1     May         Deny    2     Jul     1    Aug         Sep     1    Oct         Nov         Dec         Tot           Seizure Triggers:   Sleep Deprivation - None  Other medicaitons - None  Psych/stress - excitement can trigger jorge athletic event, when feel frustrated  Photic stimulation - None  Hyperventilation - None  Medical Problems - None  Sensory Stimulation (light, sound, etc) - None  Missed dose of meds - Denies missing meds since last visit though has missed meds in the past which were closely followed by seizures.   Computer use may trigger- sometimes  Exercise/overexertion  may trigger- sometimes    AED Treatments  Present regimen  clobezam (Onfi or Frizium, CLB):  40 mg bid  lacosamide (Vimpat,  LCS) :  200 mg bid  lamotrigine (Lamictal, LTG)   600 mg bid  Perampanel: 6 mg qd    Results for GEORGETTE MONCADA (MRN [id number]) as of 12/16/2015 14:24   Ref. Range 4/1/2015 13:20 8/5/2015 14:05 8/21/2015 11:40   Lamotrigine Lvl Latest Range: 2.0-15.0 ug/mL 13.4  17.3 (H)     Ativan PRN    Prior treatments  carbamazepine (Tegretol, CBZ):  tried in the past  ethosuximide (Zarontin, ESM):  tried in the past  felbamate (felbatol, FBM):   tried in the past  gabapentin (Neurontin, GPN):  tried in the past  levetiracetam (Keppra, LEV):   tried in the past  phenytoin (Dilantin, PHT):   tried in the past  primidone (Mysoline, PRM):   tried in the past  topiramate (Topamax, TPM):   tried in the past  valproic acid (Depakote, VPA):  tried in the past   clorazepate (Tranxene, CLZ):   Stopped previous visits.     Not tried  acetazolamide (Diamox, AZM)  amantadine  eslicarbazine (Aptiom, ESL)  methsuximide (Celontin, MSM)  methyphenytoin (Mesantion, MHT)  oxcarbazepine (Trileptal OXC)  perampanel (Fycompa, FCP)   phenobarbital (Pb)  pregabalin (Lyrica, PGB)  retigabine (Potiga, RTG)  rufinamide (Banzel, RUF)  tiagabine (Gabatril,  TGB)  viagabatrin, (Sabril, VGB)  vagal nerve stimulator (VNS)  zonisamide (Zonegran, ZNA)  Benzodiazepines  diazepam - rectal (Diastatl)  diazepam - oral (Valium, DZ)  clonazepam (Klonopin, CZP)  Brain Stimulation  Vagal Nerve Stimulation-n/a  DBS- n/a    Compliance method  Memory - yes  Mom or Spouse - Yes  Pill Box - no  Henrique calendar - no  Turn over medication bottle - no  Phone alarm - no    Seizure Evaluation  EEG Routine -   EEG Ambulatory -   EEG\Video Monitoring - 2014  MRI/MRA - 2015  CT/CTA Scan -   PET Scan -   Neuropsychological evaluation -   DEXA Scan  Had continuous EEG in 2014 no find anything abnormal.  MRI brain 2015: no new changes.     Potential Epilepsy Risk Factors:   Pregnancy/Labor/Delivery - full term uncomplicated pregnancy labor and vaginal delivery  Febrile seizures -  none  Head injury  - none  CNS infection - none     Stroke - none  Family Hx of Sz - none    PAST MEDICAL HISTORY:   Epilepsy     PAST SURGICAL HISTORY: epilepsy surgery 2008    FAMILY HISTORY:   Family History   Problem Relation Age of Onset    Diabetes Father     Cancer Father     Diabetes Sister     Cancer Paternal Aunt     Diabetes Paternal Uncle     Cancer Paternal Grandmother     Diabetes Paternal Grandfather     Cancer Paternal Grandfather            SOCIAL HISTORY:   Social History     Socioeconomic History    Marital status: Single     Spouse name: Not on file    Number of children: Not on file    Years of education: Not on file    Highest education level: Not on file   Occupational History    Not on file   Social Needs    Financial resource strain: Not on file    Food insecurity:     Worry: Not on file     Inability: Not on file    Transportation needs:     Medical: Not on file     Non-medical: Not on file   Tobacco Use    Smoking status: Never Smoker   Substance and Sexual Activity    Alcohol use: No    Drug use: No    Sexual activity: Not on file   Lifestyle    Physical activity:     Days per week: Not on file     Minutes per session: Not on file    Stress: Not on file   Relationships    Social connections:     Talks on phone: Not on file     Gets together: Not on file     Attends Sikh service: Not on file     Active member of club or organization: Not on file     Attends meetings of clubs or organizations: Not on file     Relationship status: Not on file    Intimate partner violence:     Fear of current or ex partner: Not on file     Emotionally abused: Not on file     Physically abused: Not on file     Forced sexual activity: Not on file   Other Topics Concern    Not on file   Social History Narrative    Not on file        SUBSTANCE USE:  Social History     Tobacco Use    Smoking status: Never Smoker   Substance and Sexual Activity    Alcohol use: No    Drug use: No  "   Sexual activity: Not on file      Social History     Tobacco Use    Smoking status: Never Smoker   Substance Use Topics    Alcohol use: No        ALLERGIES: Patient has no known allergies.     /84   Pulse 90   Ht 5' 11" (1.803 m)   Wt 92.1 kg (203 lb 0.7 oz)   BMI 28.32 kg/m²     Higher Cortical Function:    Patient is a well developed, pleasant, well groomed individual appearing their stated age  Oriented - intact to person, place and time and followed two step instruction correctly.    Fund of knowledge was appropriate.    R-L Orientation - Intact   Language - Speech showed impaired verbal fluency and expression.  Some perseveration of speech.  Agnosias, agraphesthesia, or astereognosis - not present.   Extinction with double simultaneous stimulation:        Proximal-distal stimulation - Not present        Right-left stimulation - Not present  Cranial Nerves II - XII:    EOMs were intact with normal smooth and a mild end gaze nystagmus.    PERRLA. D/C     Motor - facial movement was symmetrical and normal.    Facial sensory - Light touch and pin prick sensations were normal.    Hearing was normal to finger rub.  Palate moved well and was symmetrical with normal palatal and oral sensation.    Tongue movement was full & the patient could say "la la la" and "Ka Ka Ka" without  difficulty. Patient repeated Hoahaoism and Anabaptism without difficulty. Normal power and bulk was found in the massiter and rotator muscles of the neck.  Motor: Power, bulk and tone were normal in all extremities.  Sensory: Light touch, pin prick, vibration and position senses were normal in all extremities.    Coordination:       Rapid alternating movements and rapid finger tapping - normal.       Finger to nose - nl.       Arm roll - symmetrical.    Gait:  Station, gait and tandem walking were done without difficulty and Romberg was negative.    Tremor: resting, postural, intentional - none    Pulses    Carotids - strong " without bruits    Peripheral - strong and symmetrical      Model # [id number] M106  Implant Date: 2/21/2019  Is Device palpable in chest wall  Yes  Side of implant    L          Initial  Reprogram   Output current  mA 0.0  0.25  Signal Freq          Hz 30  30  Pulse width        uSec 500  500  Signal on time     Sec 30  30  Signal off time      Min 10.0  10.0    Magnet settings  Output current          mA 0.0  0.375  Pulse width        uSec 500  500  Signal on time         Sec 60  60    Autostimulation (AS)  Output Current          mA 0.0  0.25  Pulse width        uSec 500  500  Signal on time         Sec 60  Tachycardia detect  On/Off  On/off  Threshhold for AS %         IMPRESSION  1. Complex partial  2. GTC  3. Post Left temporal lobe resection  4. He has infrequent seizures and improved with last dosage change  5. S/p VNS implantation    DISPOSITION:   1. Continue clobazam 80 mg per day   2. Continue lacosamide (Vimpat, LCS) : 200 mg 1 BID  3. Continue lamotrigine (Lamictal, LTG) 600 mg BID (3 tab BID)   4.  Continue Perampanel 2 mg but increase to 3 tabs QD   5. I discussed VNS with patient, Dad, and Mom in detail. All questions were answered to their satisfaction.    6.  VNS interrogated and adjusted as above  7. RTC in 4 weeks for further VNS adjustment    Dr. Urias was available for this encounter.

## 2019-03-27 NOTE — LETTER
March 27, 2019      MARIANA Urias MD  [address]  [facility] [address]           [facility] - Neurology Epilepsy  [address]  [facility] [facility] [address]  Phone: [phone]  Fax: [phone]          Patient: Nicholas Nagel Jr.   MR Number: [id number]   YOB: 1975   Date of Visit: 3/27/2019       Dear Dr. MARIANA Urias:    Thank you for referring Nicholas Nagel to me for evaluation. Attached you will find relevant portions of my assessment and plan of care.    If you have questions, please do not hesitate to call me. I look forward to following Nicholas Nagel along with you.    Sincerely,    Julianna Walsh PA-C    Enclosure  CC:  No Recipients    If you would like to receive this communication electronically, please contact [email or web address] or [phone] to request more information on [employer] access.    For providers and/or their staff who would like to refer a patient to [facility], please contact us through our one-stop-shop provider referral line, [facility] , at [phone].    If you feel you have received this communication in error or would no longer like to receive these types of communications, please e-mail [email or web address]

## 2019-04-16 ENCOUNTER — TELEPHONE (OUTPATIENT)
Dept: NEUROLOGY | Facility: CLINIC | Age: 44
End: 2019-04-16

## 2019-04-16 NOTE — TELEPHONE ENCOUNTER
----- Message from Kenji Workman sent at 4/16/2019 10:21 AM CDT -----  Needs Advice    Reason for call: Pt states [facility] has faxed a request to fill Fycompa. Pt is asking this be taken care of asap.        Communication Preference: [phone]    Additional Information:

## 2019-04-24 ENCOUNTER — LAB VISIT (OUTPATIENT)
Dept: LAB | Facility: HOSPITAL | Age: 44
End: 2019-04-24
Payer: COMMERCIAL

## 2019-04-24 ENCOUNTER — OFFICE VISIT (OUTPATIENT)
Dept: NEUROLOGY | Facility: CLINIC | Age: 44
End: 2019-04-24
Payer: COMMERCIAL

## 2019-04-24 VITALS
HEART RATE: 85 BPM | BODY MASS INDEX: 28.42 KG/M2 | SYSTOLIC BLOOD PRESSURE: 129 MMHG | WEIGHT: 203 LBS | DIASTOLIC BLOOD PRESSURE: 87 MMHG | HEIGHT: 71 IN

## 2019-04-24 DIAGNOSIS — G40.219 LOCALIZATION-RELATED SYMPTOMATIC EPILEPSY AND EPILEPTIC SYNDROMES WITH COMPLEX PARTIAL SEIZURES, INTRACTABLE, WITHOUT STATUS EPILEPTICUS: ICD-10-CM

## 2019-04-24 DIAGNOSIS — Z96.89 S/P PLACEMENT OF VNS (VAGUS NERVE STIMULATION) DEVICE: ICD-10-CM

## 2019-04-24 DIAGNOSIS — G40.219 LOCALIZATION-RELATED SYMPTOMATIC EPILEPSY AND EPILEPTIC SYNDROMES WITH COMPLEX PARTIAL SEIZURES, INTRACTABLE, WITHOUT STATUS EPILEPTICUS: Primary | ICD-10-CM

## 2019-04-24 PROCEDURE — 3008F PR BODY MASS INDEX (BMI) DOCUMENTED: ICD-10-PCS | Mod: S$GLB,,, | Performed by: PHYSICIAN ASSISTANT

## 2019-04-24 PROCEDURE — 95976 PR ELEC ANALYSIS, IMPLT NEURO PULSE GEN, W/PRGRM, SMPL CRAN NERVE: ICD-10-PCS | Mod: S$GLB,,, | Performed by: PHYSICIAN ASSISTANT

## 2019-04-24 PROCEDURE — 3008F BODY MASS INDEX DOCD: CPT | Mod: S$GLB,,, | Performed by: PHYSICIAN ASSISTANT

## 2019-04-24 PROCEDURE — 80175 DRUG SCREEN QUAN LAMOTRIGINE: CPT

## 2019-04-24 PROCEDURE — 99212 OFFICE O/P EST SF 10 MIN: CPT | Mod: S$GLB,,, | Performed by: PHYSICIAN ASSISTANT

## 2019-04-24 PROCEDURE — 80339 ANTIEPILEPTICS NOS 1-3: CPT

## 2019-04-24 PROCEDURE — 99999 PR PBB SHADOW E&M-EST. PATIENT-LVL III: ICD-10-PCS | Mod: PBBFAC,,, | Performed by: PHYSICIAN ASSISTANT

## 2019-04-24 PROCEDURE — 99212 PR OFFICE/OUTPT VISIT, EST, LEVL II, 10-19 MIN: ICD-10-PCS | Mod: S$GLB,,, | Performed by: PHYSICIAN ASSISTANT

## 2019-04-24 PROCEDURE — 95976 ALYS SMPL CN NPGT PRGRMG: CPT | Mod: S$GLB,,, | Performed by: PHYSICIAN ASSISTANT

## 2019-04-24 PROCEDURE — 80299 QUANTITATIVE ASSAY DRUG: CPT

## 2019-04-24 PROCEDURE — 99999 PR PBB SHADOW E&M-EST. PATIENT-LVL III: CPT | Mod: PBBFAC,,, | Performed by: PHYSICIAN ASSISTANT

## 2019-04-24 NOTE — LETTER
April 24, 2019      MARIANA Urias MD  [address]  [facility] [address]           [facility] - Neurology Epilepsy  [address]  [facility] [address]  Phone: [phone]  Fax: [phone]          Patient: Nicholas Nagel Jr.   MR Number: [id number]   YOB: 1975   Date of Visit: 4/24/2019       Dear Dr. MARIANA Urias:    Thank you for referring Nicholas Nagel to me for evaluation. Attached you will find relevant portions of my assessment and plan of care.    If you have questions, please do not hesitate to call me. I look forward to following Nicholas Nagel along with you.    Sincerely,    Julianna Walsh PA-C    Enclosure  CC:  No Recipients    If you would like to receive this communication electronically, please contact [email or web address] or [phone] to request more information on [employer] Link access.    For providers and/or their staff who would like to refer a patient to [facility], please contact us through our one-stop-shop provider referral line, [facility] , at [phone].    If you feel you have received this communication in error or would no longer like to receive these types of communications, please e-mail [email or web address]

## 2019-04-24 NOTE — PROGRESS NOTES
Name: Nicholas Nagel Jr.  MRN: [id number]   CSN: [id number]      Date: 04/24/2019    HISTORY OF PRESENT ILLNESS (HPI)  The patient is a 43 y.o. yo RHBM   2019/04/24  Doing well since last visit, no reported seizures. Tolerated initial VNS settings well. Returns today for continued VNS adjustment. Medication regimen unchanged.     2019/03/27  VNS implanted 2/21/19, tolerated procedure well. He was seen by NSGY in a virtual visit 3/7/19 for his 2 week post-op appointment, incision clean/dry/intact and well approximated at that time, and on exam today. Patient/family report no recent seizures. Continues to take Lacosamide, Clobazam, Lamotrigine, and Perampanel as prescribed. Presents today for first VNS adjustment.     2018/09/13  Patient reports 3 mild CPS since the last visit.  Last visit I gave him a schedule to increase lacosamide in two steps to 200 mg 1½ BID.  When he reached this dose he became dizzy and reduced the morning dose to 1 tab with resolution of the dizzinesss.  Last visit I discussed VNS treatment and today the patient wants to proceed with implanting the device.  He has no other medical problems except allergies.  He does take loratadine which works on the H1 reception similar to Benadryl but has not been been implicated in lowering seizure threshhold.      2018/01/26  The patient reports 3 sz since the last visit.  The most recent may have resulted from missed dose.  No triggers evident for the other 2 sz.  The patient reports he has an aura but can not describe what he perceives.      2017/07/13  In the past 6 months the patient has experienced five seizures (about 1 per month).   The patient was having difficulty expressing himself - speech is halting and disconnected.  He has been taking ativan prior to social event or watching sports to prevent seizure.  He has been taking ativan about 2 times a month.      2016/04/19 and 2017/01/23   Since the last visit the patient has experienced one  seizures (12 Jan 2017) .  Meds were increased last time and he is tolerating the new dosages well.  There was no obvious provocative reason.  He has a good strategy for compliance.  He had no concurrent medical problem and had not taken benadryl or other proconvulsive medication.      Results for GEORGETTE MONCADA (MRN [id number]) as of 1/23/2017 14:26   Ref. Range 4/1/2015 13:20 8/5/2015 14:05 8/21/2015 11:40   Lamotrigine Lvl Latest Ref Range: 2.0 - 15.0 ug/mL 13.4  17.3 (H)   MRI PREVIOUS Unknown  Rpt    Lacosamide Latest Ref Range: 1.0 - 10.0 mcg/mL 8.0           pt has had the following episodes:  2015/4/8 - Pt had just finished a walk.  Eye blinking and stereotypic hand movements.  No generalized convulsions were noted. Didn't lose consciousness.  Lasted 30s to 1 minute.  2015/6/6 - Pt was in his room.  Came to his dad to give him his phone to record what was happening.  Said he has the feeling that he's about to have a seizure.  Says there is no aura per se.  Says a thought that he's about to have a seizure pops into his head, which usually precedes his events.  Said his speech got markedly worse during the event.  Eye blinking lasted 30s to 1 minute and resolved spontaneously.  Also had stereotypic hand movements and eye blinking.  Maintained consciousness throughout episode.  Pt had no memory of the seizure, which was relayed by his dad.    2015/6/8 - Pt was running around a track.  Said he overexerted himself over the last three laps.  Said he had another event immediately after exercise.  Again, this episode was brief (30s-1min) and was characterized by walking in circles, eye blinking, and stereotypic hand motions of rubbing his fingers. Had no intraictal recall.      Previous History (2015/4/1)   The patient was referred for consultation by Dr. Guerrero due to want to transfer care here due to long distance, patient is living [facility].  The patient was accompanied by his mother and father who provided  some of the history.      Seizure surgery 2008 for epilepsy. Since then, less frequent and not as strong, still have seizure twice a month with current medication dosage. He is compliant with medication, tolerates medication well, no side effect noticed. On current regime for about 5 years, change to onfi 2-3 years ago in place of keppra.       Epilepsy History  ED visits: last year, feel someone talking to him  Episodes of SE  Change in meds    Seizure Seminology  Seizure Type 1  Classification:   Aura - feel it is coming, his mind told him that seizure is coming  Ictus  - Nonconv -  - Conv - whole body jerking more on the right side, after a couple of jerk, he is out, first started has tongue biting and incontinence  - Duration - 2 minutes  Post-ictal  - Symptoms: can not talk, sleepiness no headache  - Duration: 5-10 minutes  Age of onset : 6 year old  Current Seizure Frequency -  Initially once week,  Last Seizure: 10 years ago    Seizure Type 2: after seizure  Classification:   Aura - feel it is coming, but not as strong as before  Ictus  - Nonconv -  - Conv - blinking and body jerk more on the arm.   - Duration - 30 sec  Post-ictal  - Symptoms: no sleepiness  - Duration: back to baseline after ictus right away  Age of onset : 29  Current Seizure Frequency -  Last Seizure: a couple weeks ago    sz per month 2012 2013 2014 2015 2016 2017   Doug         Feb         Mar         Apr    1     May         Deny    2     Jul     1    Aug         Sep     1    Oct         Nov         Dec         Tot           Seizure Triggers:   Sleep Deprivation - None  Other medicaitons - None  Psych/stress - excitement can trigger jorge athletic event, when feel frustrated  Photic stimulation - None  Hyperventilation - None  Medical Problems - None  Sensory Stimulation (light, sound, etc) - None  Missed dose of meds - Denies missing meds since last visit though has missed meds in the past which were closely followed by seizures.    Computer use may trigger- sometimes  Exercise/overexertion  may trigger- sometimes    AED Treatments  Present regimen  clobezam (Onfi or Frizium, CLB):  40 mg bid  lacosamide (Vimpat, LCS) :  200 mg bid  lamotrigine (Lamictal, LTG)   600 mg bid  Perampanel: 6 mg qd    Results for GEORGETTE MONCADA (MRN [id number]) as of 12/16/2015 14:24   Ref. Range 4/1/2015 13:20 8/5/2015 14:05 8/21/2015 11:40   Lamotrigine Lvl Latest Range: 2.0-15.0 ug/mL 13.4  17.3 (H)     Ativan PRN    Prior treatments  carbamazepine (Tegretol, CBZ):  tried in the past  ethosuximide (Zarontin, ESM):  tried in the past  felbamate (felbatol, FBM):   tried in the past  gabapentin (Neurontin, GPN):  tried in the past  levetiracetam (Keppra, LEV):   tried in the past  phenytoin (Dilantin, PHT):   tried in the past  primidone (Mysoline, PRM):   tried in the past  topiramate (Topamax, TPM):   tried in the past  valproic acid (Depakote, VPA):  tried in the past   clorazepate (Tranxene, CLZ):   Stopped previous visits.     Not tried  acetazolamide (Diamox, AZM)  amantadine  eslicarbazine (Aptiom, ESL)  methsuximide (Celontin, MSM)  methyphenytoin (Mesantion, MHT)  oxcarbazepine (Trileptal OXC)  perampanel (Fycompa, FCP)   phenobarbital (Pb)  pregabalin (Lyrica, PGB)  retigabine (Potiga, RTG)  rufinamide (Banzel, RUF)  tiagabine (Gabatril,  TGB)  viagabatrin, (Sabril, VGB)  vagal nerve stimulator (VNS)  zonisamide (Zonegran, ZNA)  Benzodiazepines  diazepam - rectal (Diastatl)  diazepam - oral (Valium, DZ)  clonazepam (Klonopin, CZP)  Brain Stimulation  Vagal Nerve Stimulation-n/a  DBS- n/a    Compliance method  Memory - yes  Mom or Spouse - Yes  Pill Box - no  Henrique calendar - no  Turn over medication bottle - no  Phone alarm - no    Seizure Evaluation  EEG Routine -   EEG Ambulatory -   EEG\Video Monitoring - 2014  MRI/MRA - 2015  CT/CTA Scan -   PET Scan -   Neuropsychological evaluation -   DEXA Scan  Had continuous EEG in 2014 no find anything  abnormal.  MRI brain 2015: no new changes.     Potential Epilepsy Risk Factors:   Pregnancy/Labor/Delivery - full term uncomplicated pregnancy labor and vaginal delivery  Febrile seizures - none  Head injury  - none  CNS infection - none     Stroke - none  Family Hx of Sz - none    PAST MEDICAL HISTORY:   Epilepsy     PAST SURGICAL HISTORY: epilepsy surgery 2008    FAMILY HISTORY:   Family History   Problem Relation Age of Onset    Diabetes Father     Cancer Father     Diabetes Sister     Cancer Paternal Aunt     Diabetes Paternal Uncle     Cancer Paternal Grandmother     Diabetes Paternal Grandfather     Cancer Paternal Grandfather            SOCIAL HISTORY:   Social History     Socioeconomic History    Marital status: Single     Spouse name: Not on file    Number of children: Not on file    Years of education: Not on file    Highest education level: Not on file   Occupational History    Not on file   Social Needs    Financial resource strain: Not on file    Food insecurity:     Worry: Not on file     Inability: Not on file    Transportation needs:     Medical: Not on file     Non-medical: Not on file   Tobacco Use    Smoking status: Never Smoker   Substance and Sexual Activity    Alcohol use: No    Drug use: No    Sexual activity: Not on file   Lifestyle    Physical activity:     Days per week: Not on file     Minutes per session: Not on file    Stress: Not on file   Relationships    Social connections:     Talks on phone: Not on file     Gets together: Not on file     Attends [employer] service: Not on file     Active member of club or organization: Not on file     Attends meetings of clubs or organizations: Not on file     Relationship status: Not on file   Other Topics Concern    Not on file   Social History Narrative    Not on file        SUBSTANCE USE:  Social History     Tobacco Use    Smoking status: Never Smoker   Substance and Sexual Activity    Alcohol use: No    Drug use: No  "   Sexual activity: Not on file      Social History     Tobacco Use    Smoking status: Never Smoker   Substance Use Topics    Alcohol use: No        ALLERGIES: Patient has no known allergies.     There were no vitals taken for this visit.    Higher Cortical Function:    Patient is a well developed, pleasant, well groomed individual appearing their stated age  Oriented - intact to person, place and time and followed two step instruction correctly.    Fund of knowledge was appropriate.    R-L Orientation - Intact   Language - Speech showed impaired verbal fluency and expression.  Some perseveration of speech.  Agnosias, agraphesthesia, or astereognosis - not present.   Extinction with double simultaneous stimulation:        Proximal-distal stimulation - Not present        Right-left stimulation - Not present  Cranial Nerves II - XII:    EOMs were intact with normal smooth and a mild end gaze nystagmus.    PERRLA. D/C     Motor - facial movement was symmetrical and normal.    Facial sensory - Light touch and pin prick sensations were normal.    Hearing was normal to finger rub.  Palate moved well and was symmetrical with normal palatal and oral sensation.    Tongue movement was full & the patient could say "la la la" and "Ka Ka Ka" without  difficulty. Patient repeated Restorationism and [employer] without difficulty. Normal power and bulk was found in the massiter and rotator muscles of the neck.  Motor: Power, bulk and tone were normal in all extremities.  Sensory: Light touch, pin prick, vibration and position senses were normal in all extremities.    Coordination:       Rapid alternating movements and rapid finger tapping - normal.       Finger to nose - nl.       Arm roll - symmetrical.    Gait:  Station, gait and tandem walking were done without difficulty and Romberg was negative.    Tremor: resting, postural, intentional - none    Pulses    Carotids - strong without bruits    Peripheral - strong and symmetrical "      Model # [id number] M106  Implant Date: 2/21/2019  Is Device palpable in chest wall  Yes  Side of implant    L          Initial  Reprogram   Output current  mA 0.25  0.375  Signal Freq          Hz 30  30  Pulse width        uSec 500  500  Signal on time     Sec 30  30  Signal off time      Min 10.0  10.0    Magnet settings  Output current          mA 0.375  0.5  Pulse width        uSec 500  750  Signal on time         Sec 60  60    Autostimulation (AS)  Output Current          mA 0.25  0.5  Pulse width        uSec 500  500  Signal on time         Sec 60  Tachycardia detect  On/Off  On/off  Threshhold for AS %         IMPRESSION  1. Complex partial  2. GTC  3. Post Left temporal lobe resection  4. S/p VNS implantation    DISPOSITION:   1. Continue clobazam 80 mg per day   2. Continue lacosamide (Vimpat, LCS) : 200 mg 1 am, 1.5 pm  3. Continue lamotrigine (Lamictal, LTG) 600 mg BID (3 tab BID)   4.  Continue Perampanel 2 mg 3 tabs QD   5. VNS interrogated and adjusted as above  6.  Check levels today - Lacosamide, lamotrigine, clobazam  7. RTC in 3-4 months, or sooner as needed     Dr. Urias was available for this encounter.

## 2019-04-26 LAB
CLOBAZAM SERPL-MCNC: 916 NG/ML (ref 30–300)
LACOSAMIDE: 7.5 MCG/ML (ref 1–10)
LAMOTRIGINE SERPL-MCNC: 13.2 UG/ML (ref 2–15)
NORCLOBAZAM SERPL-MCNC: 6180 NG/ML (ref 300–3000)

## 2019-05-27 ENCOUNTER — TELEPHONE (OUTPATIENT)
Dept: NEUROLOGY | Facility: CLINIC | Age: 44
End: 2019-05-27

## 2019-05-27 NOTE — TELEPHONE ENCOUNTER
I sent refill approval per Dr. Urias for lamotrigine 200mg-3 BID for 90 days plus 3 refills to [facility]

## 2019-08-20 ENCOUNTER — TELEPHONE (OUTPATIENT)
Dept: NEUROLOGY | Facility: CLINIC | Age: 44
End: 2019-08-20

## 2019-08-20 NOTE — TELEPHONE ENCOUNTER
I called in clobazam 20mg-2 BID and vimpat 200mg-1.5 BID per Dr. Urias's instructions for 3 months and no refills to [facility]

## 2019-08-29 ENCOUNTER — OFFICE VISIT (OUTPATIENT)
Dept: NEUROLOGY | Facility: CLINIC | Age: 44
End: 2019-08-29
Payer: COMMERCIAL

## 2019-08-29 VITALS
DIASTOLIC BLOOD PRESSURE: 94 MMHG | HEIGHT: 71 IN | HEART RATE: 88 BPM | WEIGHT: 205.69 LBS | SYSTOLIC BLOOD PRESSURE: 127 MMHG | BODY MASS INDEX: 28.8 KG/M2

## 2019-08-29 DIAGNOSIS — G40.219 LOCALIZATION-RELATED SYMPTOMATIC EPILEPSY AND EPILEPTIC SYNDROMES WITH COMPLEX PARTIAL SEIZURES, INTRACTABLE, WITHOUT STATUS EPILEPTICUS: ICD-10-CM

## 2019-08-29 PROCEDURE — 99214 PR OFFICE/OUTPT VISIT, EST, LEVL IV, 30-39 MIN: ICD-10-PCS | Mod: S$GLB,,, | Performed by: PSYCHIATRY & NEUROLOGY

## 2019-08-29 PROCEDURE — 3008F BODY MASS INDEX DOCD: CPT | Mod: S$GLB,,, | Performed by: PSYCHIATRY & NEUROLOGY

## 2019-08-29 PROCEDURE — 99999 PR PBB SHADOW E&M-EST. PATIENT-LVL III: ICD-10-PCS | Mod: PBBFAC,,, | Performed by: PSYCHIATRY & NEUROLOGY

## 2019-08-29 PROCEDURE — 99999 PR PBB SHADOW E&M-EST. PATIENT-LVL III: CPT | Mod: PBBFAC,,, | Performed by: PSYCHIATRY & NEUROLOGY

## 2019-08-29 PROCEDURE — 3008F PR BODY MASS INDEX (BMI) DOCUMENTED: ICD-10-PCS | Mod: S$GLB,,, | Performed by: PSYCHIATRY & NEUROLOGY

## 2019-08-29 PROCEDURE — 99214 OFFICE O/P EST MOD 30 MIN: CPT | Mod: S$GLB,,, | Performed by: PSYCHIATRY & NEUROLOGY

## 2019-08-29 RX ORDER — LORAZEPAM 1 MG/1
1 TABLET ORAL EVERY 12 HOURS PRN
Qty: 15 TABLET | Refills: 0 | Status: SHIPPED | OUTPATIENT
Start: 2019-08-29 | End: 2020-08-12 | Stop reason: SDUPTHER

## 2019-08-29 RX ORDER — CLOBAZAM 20 MG/1
40 TABLET ORAL 2 TIMES DAILY
Qty: 360 TABLET | Refills: 2 | Status: SHIPPED | OUTPATIENT
Start: 2019-08-29 | End: 2019-12-12 | Stop reason: SDUPTHER

## 2019-08-29 RX ORDER — LACOSAMIDE 200 MG/1
300 TABLET, FILM COATED ORAL 2 TIMES DAILY
Qty: 270 TABLET | Refills: 2 | Status: SHIPPED | OUTPATIENT
Start: 2019-08-29 | End: 2019-12-12 | Stop reason: SDUPTHER

## 2019-08-29 RX ORDER — LAMOTRIGINE 200 MG/1
300 TABLET ORAL 2 TIMES DAILY
Qty: 270 TABLET | Refills: 3 | Status: SHIPPED | OUTPATIENT
Start: 2019-08-29 | End: 2020-06-10 | Stop reason: SDUPTHER

## 2019-08-29 NOTE — PROGRESS NOTES
Name: Nicholas Nagel Jr.  MRN: [id number]   CSN: [id number]      Date: 08/29/2019    HISTORY OF PRESENT ILLNESS (HPI)  The [facility] is [facility] 43 [facility]       2019/08/29  Patient reports three sz since last visit.  All have been partial with no convulsions.  He is tolerating the VNS well.  He has not been using the magnet to block seizures..  He is tolerating the medication well.  In the past he would usually have a seizure when he went to a game.  I have given him 1 mg ativan to take 1 hrs before a game and  This has worked well in preventing seizures.      2019/04/24  Doing well since last visit, no reported seizures. Tolerated initial VNS settings well. Returns today for continued VNS adjustment. Medication regimen unchanged.     2019/03/27  VNS implanted 2/21/19, tolerated procedure well. He was seen by NSGY in a virtual visit 3/7/19 for his 2 week post-op appointment, incision clean/dry/intact and well approximated at that time, and on exam today. Patient/family report no recent seizures. Continues to take Lacosamide, Clobazam, Lamotrigine, and Perampanel as prescribed. Presents today for first VNS adjustment.     2018/09/13  Patient reports 3 mild CPS since the last visit.  Last visit I gave him a schedule to increase lacosamide in two steps to 200 mg 1½ BID.  When he reached this dose he became dizzy and reduced the morning dose to 1 tab with resolution of the dizzinesss.  Last visit I discussed VNS treatment and today the patient wants to proceed with implanting the device.  He has no other medical problems except allergies.  He does take loratadine which works on the H1 reception similar to Benadryl but has not been been implicated in lowering seizure threshhold.      2018/01/26  The patient reports 3 sz since the last visit.  The most recent may have resulted from missed dose.  No triggers evident for the other 2 sz.  The patient reports he has an aura but can not describe what he perceives.       2017/07/13  In the past 6 months the patient has experienced five seizures (about 1 per month).   The patient was having difficulty expressing himself - speech is halting and disconnected.  He has been taking ativan prior to social event or watching sports to prevent seizure.  He has been taking ativan about 2 times a month.      2016/04/19 and 2017/01/23   Since the last visit the patient has experienced one seizures (12 Jan 2017) .  Meds were increased last time and he is tolerating the new dosages well.  There was no obvious provocative reason.  He has a good strategy for compliance.  He had no concurrent medical problem and had not taken benadryl or other proconvulsive medication.      Results for GEORGETTE MONCADA (MRN [id number]) as of 1/23/2017 14:26   Ref. Range 4/1/2015 13:20 8/5/2015 14:05 8/21/2015 11:40   Lamotrigine Lvl Latest Ref Range: 2.0 - 15.0 ug/mL 13.4  17.3 (H)   MRI PREVIOUS Unknown  Rpt    Lacosamide Latest Ref Range: 1.0 - 10.0 mcg/mL 8.0           pt has had the following episodes:  2015/4/8 - Pt had just finished a walk.  Eye blinking and stereotypic hand movements.  No generalized convulsions were noted. Didn't lose consciousness.  Lasted 30s to 1 minute.  2015/6/6 - Pt was in his room.  Came to his dad to give him his phone to record what was happening.  Said he has the feeling that he's about to have a seizure.  Says there is no aura per se.  Says a thought that he's about to have a seizure pops into his head, which usually precedes his events.  Said his speech got markedly worse during the event.  Eye blinking lasted 30s to 1 minute and resolved spontaneously.  Also had stereotypic hand movements and eye blinking.  Maintained consciousness throughout episode.  Pt had no memory of the seizure, which was relayed by his dad.    2015/6/8 - Pt was running around a track.  Said he overexerted himself over the last three laps.  Said he had another event immediately after exercise.   Again, this episode was brief (30s-1min) and was characterized by walking in circles, eye blinking, and stereotypic hand motions of rubbing his fingers. Had no intraictal recall.      Previous History (2015/4/1)   The patient was referred for consultation by Dr. Guerrero due to want to transfer care here due to long distance, patient is living [facility].  The patient was accompanied by his mother and father who provided some of the history.      Seizure surgery 2008 for epilepsy. Since then, less frequent and not as strong, still have seizure twice a month with current medication dosage. He is compliant with medication, tolerates medication well, no side effect noticed. On current regime for about 5 years, change to onfi 2-3 years ago in place of keppra.       Epilepsy History  ED visits: last year, feel someone talking to him  Episodes of SE  Change in meds    Seizure Seminology  Seizure Type 1  Classification:   Aura - feel it is coming, his mind told him that seizure is coming  Ictus  - Nonconv -  - Conv - whole body jerking more on the right side, after a couple of jerk, he is out, first started has tongue biting and incontinence  - Duration - 2 minutes  Post-ictal  - Symptoms: can not talk, sleepiness no headache  - Duration: 5-10 minutes  Age of onset : 6 year old  Current Seizure Frequency -  Initially once week,  Last Seizure: 10 years ago    Seizure Type 2: after seizure  Classification:   Aura - feel it is coming, but not as strong as before  Ictus  - Nonconv -  - Conv - blinking and body jerk more on the arm.   - Duration - 30 sec  Post-ictal  - Symptoms: no sleepiness  - Duration: back to baseline after ictus right away  Age of onset : 29  Current Seizure Frequency -  Last Seizure: a couple weeks ago    sz per month 2012 2013 2014 2015 2016 2017   Doug         Feb         Mar         Apr    1     May         Deny    2     Jul     1    Aug         Sep     1    Oct         Nov         Dec         Tot            Seizure Triggers:   Sleep Deprivation - None  Other medicaitons - None  Psych/stress - excitement can trigger jorge athletic event, when feel frustrated  Photic stimulation - None  Hyperventilation - None  Medical Problems - None  Sensory Stimulation (light, sound, etc) - None  Missed dose of meds - Denies missing meds since last visit though has missed meds in the past which were closely followed by seizures.   Computer use may trigger- sometimes  Exercise/overexertion  may trigger- sometimes    AED Treatments  Present regimen  clobezam (Onfi or Frizium, CLB):  40 mg bid  lacosamide (Vimpat, LCS) :  200 mg bid  lamotrigine (Lamictal, LTG)   600 mg bid  Perampanel: 6 mg qd    Results for GEORGETTE MONCADA (MRN [id number]) as of 12/16/2015 14:24   Ref. Range 4/1/2015 13:20 8/5/2015 14:05 8/21/2015 11:40   Lamotrigine Lvl Latest Range: 2.0-15.0 ug/mL 13.4  17.3 (H)     Ativan PRN    Prior treatments  carbamazepine (Tegretol, CBZ):  tried in the past  ethosuximide (Zarontin, ESM):  tried in the past  felbamate (felbatol, FBM):   tried in the past  gabapentin (Neurontin, GPN):  tried in the past  levetiracetam (Keppra, LEV):   tried in the past  phenytoin (Dilantin, PHT):   tried in the past  primidone (Mysoline, PRM):   tried in the past  topiramate (Topamax, TPM):   tried in the past  valproic acid (Depakote, VPA):  tried in the past   clorazepate (Tranxene, CLZ):   Stopped previous visits.     Not tried  acetazolamide (Diamox, AZM)  amantadine  eslicarbazine (Aptiom, ESL)  methsuximide (Celontin, MSM)  methyphenytoin (Mesantion, MHT)  oxcarbazepine (Trileptal OXC)  perampanel (Fycompa, FCP)   phenobarbital (Pb)  pregabalin (Lyrica, PGB)  retigabine (Potiga, RTG)  rufinamide (Banzel, RUF)  tiagabine (Gabatril,  TGB)  viagabatrin, (Sabril, VGB)  vagal nerve stimulator (VNS)  zonisamide (Zonegran, ZNA)  Benzodiazepines  diazepam - rectal (Diastatl)  diazepam - oral (Valium, DZ)  clonazepam (Klonopin, CZP)  Brain  Stimulation  Vagal Nerve Stimulation-n/a  DBS- n/a    Compliance method  Memory - yes  Mom or Spouse - Yes  Pill Box - no  Henrique calendar - no  Turn over medication bottle - no  Phone alarm - no    Seizure Evaluation  EEG Routine -   EEG Ambulatory -   EEG\Video Monitoring - 2014  MRI/MRA - 2015  CT/CTA Scan -   PET Scan -   Neuropsychological evaluation -   DEXA Scan  Had continuous EEG in 2014 no find anything abnormal.  MRI brain 2015: no new changes.     Potential Epilepsy Risk Factors:   Pregnancy/Labor/Delivery - full term uncomplicated pregnancy labor and vaginal delivery  Febrile seizures - none  Head injury  - none  CNS infection - none     Stroke - none  Family Hx of Sz - none    PAST MEDICAL HISTORY:   Epilepsy     PAST SURGICAL HISTORY: epilepsy surgery 2008    FAMILY HISTORY:   Family History   Problem Relation Age of Onset    Diabetes Father     Cancer Father     Diabetes Sister     Cancer Paternal Aunt     Diabetes Paternal Uncle     Cancer Paternal Grandmother     Diabetes Paternal Grandfather     Cancer Paternal Grandfather            SOCIAL HISTORY:   Social History     Socioeconomic History    Marital status: Single     Spouse name: Not on file    Number of children: Not on file    Years of education: Not on file    Highest education level: Not on file   Occupational History    Not on file   Social Needs    Financial resource strain: Not on file    Food insecurity:     Worry: Not on file     Inability: Not on file    Transportation needs:     Medical: Not on file     Non-medical: Not on file   Tobacco Use    Smoking status: Never Smoker   Substance and Sexual Activity    Alcohol use: No    Drug use: No    Sexual activity: Not on file   Lifestyle    Physical activity:     Days per week: Not on file     Minutes per session: Not on file    Stress: Not on file   Relationships    Social connections:     Talks on phone: Not on file     Gets together: Not on file     Attends  "Cheondoism service: Not on file     Active member of club or organization: Not on file     Attends meetings of clubs or organizations: Not on file     Relationship status: Not on file   Other Topics Concern    Not on file   Social History Narrative    Not on file        SUBSTANCE USE:  Social History     Tobacco Use    Smoking status: Never Smoker   Substance and Sexual Activity    Alcohol use: No    Drug use: No    Sexual activity: Not on file      Social History     Tobacco Use    Smoking status: Never Smoker   Substance Use Topics    Alcohol use: No        ALLERGIES: Patient has no known allergies.     BP (!) 127/94   Pulse 88   Ht 5' 11" (1.803 m)   Wt 93.3 kg (205 lb 11 oz)   BMI 28.69 kg/m²     Higher Cortical Function:    Patient is a well developed, pleasant, well groomed individual appearing their stated age  Oriented - intact to person, place and time and followed two step instruction correctly.    Fund of knowledge was appropriate.    R-L Orientation - Intact   Language - Speech showed impaired verbal fluency and expression.  Some perseveration of speech.  Agnosias, agraphesthesia, or astereognosis - not present.   Extinction with double simultaneous stimulation:        Proximal-distal stimulation - Not present        Right-left stimulation - Not present  Cranial Nerves II - XII:    EOMs were intact with normal smooth and a mild end gaze nystagmus.    PERRLA. D/C     Motor - facial movement was symmetrical and normal.    Facial sensory - Light touch and pin prick sensations were normal.    Hearing was normal to finger rub.  Palate moved well and was symmetrical with normal palatal and oral sensation.    Tongue movement was full & the patient could say "la la la" and "Ka Ka Ka" without  difficulty. Patient repeated Latter-day and Confucianist without difficulty. Normal power and bulk was found in the massiter and rotator muscles of the neck.  Motor: Power, bulk and tone were normal in all " extremities.  Sensory: Light touch, pin prick, vibration and position senses were normal in all extremities.    Coordination:       Rapid alternating movements and rapid finger tapping - normal.       Finger to nose - nl.       Arm roll - symmetrical.    Gait:  Station, gait and tandem walking were done without difficulty and Romberg was negative.    Tremor: resting, postural, intentional - none    Pulses    Carotids - strong without bruits    Peripheral - strong and symmetrical      Model # [id number] M106  Implant Date: 2/21/2019  Is Device palpable in chest wall  Yes  Side of implant    L          Initial  Reprogram   Output current  mA 0.325  0.675  Signal Freq          Hz 30  30  Pulse width        uSec 500  500  Signal on time     Sec 30  30  Signal off time      Min 10.0  10.0    Autostimulation (AS)  Output Current          mA 0.375  0.75  Pulse width        uSec 500  500  Signal on time         Sec 60    Magnet settings  Output current          mA 0.375  0.75  Pulse width        uSec 500  750  Signal on time         Sec 60  60    Tachycardia detect  On/Off  On/off  Threshhold for AS %         IMPRESSION  1. Complex partial  2. GTC  3. Post Left temporal lobe resection  4. S/p VNS implantation    DISPOSITION:   1. Continue clobazam 80 mg per day   2. Continue lacosamide (Vimpat, LCS) : 200 mg 1 am, 1.5 pm  3. Continue lamotrigine (Lamictal, LTG) 600 mg BID (3 tab BID)   4.  Continue Perampanel 2 mg 3 tabs QD   5. VNS interrogated and adjusted as above  6.  RTC in 3 months, or sooner as needed     Dr. Urias was available for this encounter.

## 2019-10-10 NOTE — TELEPHONE ENCOUNTER
----- Message from Aron Gillespie sent at 12/5/2017  4:13 PM CST -----  Contact: Pt. [phone]  Vimpat 200 Mg Refill Request     [facility] [place] - [address] AT [facility]  [address]  [place] [address]  Phone: [phone] Fax: [phone]       [General Appearance - Well Developed] : well developed [Normal Appearance] : normal appearance [Well Groomed] : well groomed [General Appearance - Well Nourished] : well nourished [No Deformities] : no deformities [General Appearance - In No Acute Distress] : no acute distress [Normal Conjunctiva] : the conjunctiva exhibited no abnormalities [Eyelids - No Xanthelasma] : the eyelids demonstrated no xanthelasmas [Normal Oropharynx] : normal oropharynx [II] : II [Neck Appearance] : the appearance of the neck was normal [Neck Cervical Mass (___cm)] : no neck mass was observed [Jugular Venous Distention Increased] : there was no jugular-venous distention [Thyroid Diffuse Enlargement] : the thyroid was not enlarged [Thyroid Nodule] : there were no palpable thyroid nodules [Heart Rate And Rhythm] : heart rate and rhythm were normal [Heart Sounds] : normal S1 and S2 [Murmurs] : no murmurs present [Respiration, Rhythm And Depth] : normal respiratory rhythm and effort [Exaggerated Use Of Accessory Muscles For Inspiration] : no accessory muscle use [Abdomen Soft] : soft [Abdomen Tenderness] : non-tender [Abdomen Mass (___ Cm)] : no abdominal mass palpated [Abnormal Walk] : normal gait [Gait - Sufficient For Exercise Testing] : the gait was sufficient for exercise testing [Nail Clubbing] : no clubbing of the fingernails [Cyanosis, Localized] : no localized cyanosis [Petechial Hemorrhages (___cm)] : no petechial hemorrhages [Skin Color & Pigmentation] : normal skin color and pigmentation [] : no rash [No Venous Stasis] : no venous stasis [Skin Lesions] : no skin lesions [No Skin Ulcers] : no skin ulcer [No Xanthoma] : no  xanthoma was observed [Deep Tendon Reflexes (DTR)] : deep tendon reflexes were 2+ and symmetric [Sensation] : the sensory exam was normal to light touch and pinprick [No Focal Deficits] : no focal deficits [Oriented To Time, Place, And Person] : oriented to person, place, and time [Impaired Insight] : insight and judgment were intact [Affect] : the affect was normal [FreeTextEntry1] : I:E ratio 1:3; clear

## 2019-10-16 ENCOUNTER — TELEPHONE (OUTPATIENT)
Dept: NEUROLOGY | Facility: CLINIC | Age: 44
End: 2019-10-16

## 2019-10-16 NOTE — TELEPHONE ENCOUNTER
----- Message from Adri Keating sent at 10/16/2019  9:54 AM CDT -----  Contact: Father   Pt father calling to find out what his son can take for his allergies.  He is on a lot of seizure medication and he needs to know what he can take.  He can be reached at [phone]

## 2019-11-11 ENCOUNTER — TELEPHONE (OUTPATIENT)
Dept: NEUROLOGY | Facility: CLINIC | Age: 44
End: 2019-11-11

## 2019-11-11 RX ORDER — PERAMPANEL 2 MG/1
TABLET ORAL
Qty: 90 TABLET | Refills: 1 | Status: SHIPPED | OUTPATIENT
Start: 2019-11-11 | End: 2019-12-12 | Stop reason: SDUPTHER

## 2019-11-11 NOTE — TELEPHONE ENCOUNTER
Called in Fycompa per clinic notes of Dr Urias to [facility] in [place].Number provided for any questions.

## 2019-11-11 NOTE — TELEPHONE ENCOUNTER
----- Message from Rohini Castro sent at 11/11/2019  8:28 AM CST -----  Contact: self @ [phone]  Pt is req a refill for perampanel (FYCOMPA) 2 mg Tab.  Pt would like you to call him after the medication has been sent to his pharmacy.     [facility] [address] [place] [address], [place] - [address] [facility] [phone] (Phone)          [phone] (Fax)

## 2019-12-12 ENCOUNTER — OFFICE VISIT (OUTPATIENT)
Dept: NEUROLOGY | Facility: CLINIC | Age: 44
End: 2019-12-12
Payer: COMMERCIAL

## 2019-12-12 VITALS
HEIGHT: 71 IN | DIASTOLIC BLOOD PRESSURE: 85 MMHG | SYSTOLIC BLOOD PRESSURE: 125 MMHG | BODY MASS INDEX: 28.69 KG/M2 | HEART RATE: 78 BPM

## 2019-12-12 DIAGNOSIS — G40.219 LOCALIZATION-RELATED SYMPTOMATIC EPILEPSY AND EPILEPTIC SYNDROMES WITH COMPLEX PARTIAL SEIZURES, INTRACTABLE, WITHOUT STATUS EPILEPTICUS: ICD-10-CM

## 2019-12-12 PROCEDURE — 99214 OFFICE O/P EST MOD 30 MIN: CPT | Mod: S$GLB,,, | Performed by: PSYCHIATRY & NEUROLOGY

## 2019-12-12 PROCEDURE — 99999 PR PBB SHADOW E&M-EST. PATIENT-LVL III: ICD-10-PCS | Mod: PBBFAC,,, | Performed by: PSYCHIATRY & NEUROLOGY

## 2019-12-12 PROCEDURE — 99999 PR PBB SHADOW E&M-EST. PATIENT-LVL III: CPT | Mod: PBBFAC,,, | Performed by: PSYCHIATRY & NEUROLOGY

## 2019-12-12 PROCEDURE — 95977 ALYS CPLX CN NPGT PRGRMG: CPT | Mod: S$GLB,,, | Performed by: PSYCHIATRY & NEUROLOGY

## 2019-12-12 PROCEDURE — 3008F BODY MASS INDEX DOCD: CPT | Mod: S$GLB,,, | Performed by: PSYCHIATRY & NEUROLOGY

## 2019-12-12 PROCEDURE — 99214 PR OFFICE/OUTPT VISIT, EST, LEVL IV, 30-39 MIN: ICD-10-PCS | Mod: S$GLB,,, | Performed by: PSYCHIATRY & NEUROLOGY

## 2019-12-12 PROCEDURE — 95977 PR ELEC ANALYSIS, IMPLT NEURO PULSE GEN, W/PRGRM, CMPLX CRAN NERVE: ICD-10-PCS | Mod: S$GLB,,, | Performed by: PSYCHIATRY & NEUROLOGY

## 2019-12-12 PROCEDURE — 3008F PR BODY MASS INDEX (BMI) DOCUMENTED: ICD-10-PCS | Mod: S$GLB,,, | Performed by: PSYCHIATRY & NEUROLOGY

## 2019-12-12 RX ORDER — LAMOTRIGINE 250 MG/1
750 TABLET, EXTENDED RELEASE ORAL 2 TIMES DAILY
Qty: 540 TABLET | Refills: 3 | Status: SHIPPED | OUTPATIENT
Start: 2019-12-12 | End: 2020-06-10

## 2019-12-12 RX ORDER — LACOSAMIDE 200 MG/1
300 TABLET, FILM COATED ORAL 2 TIMES DAILY
Qty: 270 TABLET | Refills: 2 | Status: SHIPPED | OUTPATIENT
Start: 2019-12-12 | End: 2020-06-02

## 2019-12-12 RX ORDER — CLOBAZAM 20 MG/1
40 TABLET ORAL 2 TIMES DAILY
Qty: 360 TABLET | Refills: 2 | Status: SHIPPED | OUTPATIENT
Start: 2019-12-12 | End: 2020-09-10

## 2019-12-12 NOTE — PROGRESS NOTES
Name: Nicholas Nagel Jr.  MRN: [id number]   CSN: [id number]      Date: 12/12/2019    HISTORY OF PRESENT ILLNESS (HPI)  The patient is a 43 y.o. yo RHBM     2019/12/12  Since the last visit he had only two seizures and he retained awareness with both.  He has been taking ativan when he goes to football games.  Lamictal levels have been stable and the clearance calculated from the dose/level indicates he is a rapid metabolizer.    2019/08/29  Patient reports three sz since last visit.  All have been partial with no convulsions.  He is tolerating the VNS well.  He has not been using the magnet to block seizures..  He is tolerating the medication well.  In the past he would usually have a seizure when he went to a game.  I have given him 1 mg ativan to take 1 hrs before a game and  This has worked well in preventing seizures.      2019/04/24  Doing well since last visit, no reported seizures. Tolerated initial VNS settings well. Returns today for continued VNS adjustment. Medication regimen unchanged.     2019/03/27  VNS implanted 2/21/19, tolerated procedure well. He was seen by NSGY in a virtual visit 3/7/19 for his 2 week post-op appointment, incision clean/dry/intact and well approximated at that time, and on exam today. Patient/family report no recent seizures. Continues to take Lacosamide, Clobazam, Lamotrigine, and Perampanel as prescribed. Presents today for first VNS adjustment.     2018/09/13  Patient reports 3 mild CPS since the last visit.  Last visit I gave him a schedule to increase lacosamide in two steps to 200 mg 1½ BID.  When he reached this dose he became dizzy and reduced the morning dose to 1 tab with resolution of the dizzinesss.  Last visit I discussed VNS treatment and today the patient wants to proceed with implanting the device.  He has no other medical problems except allergies.  He does take loratadine which works on the H1 reception similar to Benadryl but has not been been implicated  in lowering seizure threshhold.      2018/01/26  The patient reports 3 sz since the last visit.  The most recent may have resulted from missed dose.  No triggers evident for the other 2 sz.  The patient reports he has an aura but can not describe what he perceives.      2017/07/13  In the past 6 months the patient has experienced five seizures (about 1 per month).   The patient was having difficulty expressing himself - speech is halting and disconnected.  He has been taking ativan prior to social event or watching sports to prevent seizure.  He has been taking ativan about 2 times a month.      2016/04/19 and 2017/01/23   Since the last visit the patient has experienced one seizures (12 Jan 2017) .  Meds were increased last time and he is tolerating the new dosages well.  There was no obvious provocative reason.  He has a good strategy for compliance.  He had no concurrent medical problem and had not taken benadryl or other proconvulsive medication.      Results for GEORGETTE MONCADA (MRN [id number]) as of 1/23/2017 14:26   Ref. Range 4/1/2015 13:20 8/5/2015 14:05 8/21/2015 11:40   Lamotrigine Lvl Latest Ref Range: 2.0 - 15.0 ug/mL 13.4  17.3 (H)   MRI PREVIOUS Unknown  Rpt    Lacosamide Latest Ref Range: 1.0 - 10.0 mcg/mL 8.0           pt has had the following episodes:  2015/4/8 - Pt had just finished a walk.  Eye blinking and stereotypic hand movements.  No generalized convulsions were noted. Didn't lose consciousness.  Lasted 30s to 1 minute.  2015/6/6 - Pt was in his room.  Came to his dad to give him his phone to record what was happening.  Said he has the feeling that he's about to have a seizure.  Says there is no aura per se.  Says a thought that he's about to have a seizure pops into his head, which usually precedes his events.  Said his speech got markedly worse during the event.  Eye blinking lasted 30s to 1 minute and resolved spontaneously.  Also had stereotypic hand movements and eye blinking.   Maintained consciousness throughout episode.  Pt had no memory of the seizure, which was relayed by his dad.    2015/6/8 - Pt was running around a track.  Said he overexerted himself over the last three laps.  Said he had another event immediately after exercise.  Again, this episode was brief (30s-1min) and was characterized by walking in circles, eye blinking, and stereotypic hand motions of rubbing his fingers. Had no intraictal recall.      Previous History (2015/4/1)   The patient was referred for consultation by Dr. Guerrero due to want to transfer care here due to long distance, patient is living [facility].  The patient was accompanied by his mother and father who provided some of the history.      Seizure surgery 2008 for epilepsy. Since then, less frequent and not as strong, still have seizure twice a month with current medication dosage. He is compliant with medication, tolerates medication well, no side effect noticed. On current regime for about 5 years, change to onfi 2-3 years ago in place of keppra.       Epilepsy History  ED visits: last year, feel someone talking to him  Episodes of SE  Change in meds    Seizure Seminology  Seizure Type 1  Classification:   Aura - feel it is coming, his mind told him that seizure is coming  Ictus  - Nonconv -  - Conv - whole body jerking more on the right side, after a couple of jerk, he is out, first started has tongue biting and incontinence  - Duration - 2 minutes  Post-ictal  - Symptoms: can not talk, sleepiness no headache  - Duration: 5-10 minutes  Age of onset : 6 year old  Current Seizure Frequency -  Initially once week,  Last Seizure: 10 years ago    Seizure Type 2: after seizure  Classification:   Aura - feel it is coming, but not as strong as before  Ictus  - Nonconv -  - Conv - blinking and body jerk more on the arm.   - Duration - 30 sec  Post-ictal  - Symptoms: no sleepiness  - Duration: back to baseline after ictus right away  Age of onset : 29  Current  Seizure Frequency -  Last Seizure: a couple weeks ago    sz per month 2012 2013 2014 2015 2016 2017   Doug         Feb         Mar         Apr    1     May         Deny    2     Jul     1    Aug         Sep     1    Oct         Nov         Dec         Tot           Seizure Triggers:   Sleep Deprivation - None  Other medicaitons - None  Psych/stress - excitement can trigger jorge athletic event, when feel frustrated  Photic stimulation - None  Hyperventilation - None  Medical Problems - None  Sensory Stimulation (light, sound, etc) - None  Missed dose of meds - Denies missing meds since last visit though has missed meds in the past which were closely followed by seizures.   Computer use may trigger- sometimes  Exercise/overexertion  may trigger- sometimes    AED Treatments  Present regimen  clobezam (Onfi or Frizium, CLB):  40 mg bid  lacosamide (Vimpat, LCS) :  200 mg bid  lamotrigine (Lamictal, LTG)   600 mg bid  Perampanel: 6 mg qd    Results for GEORGETTE MONCADA (MRN [id number]) as of 12/16/2015 14:24   Ref. Range 4/1/2015 13:20 8/5/2015 14:05 8/21/2015 11:40   Lamotrigine Lvl Latest Range: 2.0-15.0 ug/mL 13.4  17.3 (H)     Ativan PRN    Prior treatments  carbamazepine (Tegretol, CBZ):  tried in the past  ethosuximide (Zarontin, ESM):  tried in the past  felbamate (felbatol, FBM):   tried in the past  gabapentin (Neurontin, GPN):  tried in the past  levetiracetam (Keppra, LEV):   tried in the past  phenytoin (Dilantin, PHT):   tried in the past  primidone (Mysoline, PRM):   tried in the past  topiramate (Topamax, TPM):   tried in the past  valproic acid (Depakote, VPA):  tried in the past   clorazepate (Tranxene, CLZ):   Stopped previous visits.     Not tried  acetazolamide (Diamox, AZM)  amantadine  eslicarbazine (Aptiom, ESL)  methsuximide (Celontin, MSM)  methyphenytoin (Mesantion, MHT)  oxcarbazepine (Trileptal OXC)  perampanel (Fycompa, FCP)   phenobarbital (Pb)  pregabalin (Lyrica, PGB)  retigabine (Potiga,  RTG)  rufinamide (Banzel, RUF)  tiagabine (Gabatril,  TGB)  viagabatrin, (Sabril, VGB)  vagal nerve stimulator (VNS)  zonisamide (Zonegran, ZNA)  Benzodiazepines  diazepam - rectal (Diastatl)  diazepam - oral (Valium, DZ)  clonazepam (Klonopin, CZP)  Brain Stimulation  Vagal Nerve Stimulation-n/a  DBS- n/a    Compliance method  Memory - yes  Mom or Spouse - Yes  Pill Box - no  Henrique calendar - no  Turn over medication bottle - no  Phone alarm - no    Seizure Evaluation  EEG Routine -   EEG Ambulatory -   EEG\Video Monitoring - 2014  MRI/MRA - 2015  CT/CTA Scan -   PET Scan -   Neuropsychological evaluation -   DEXA Scan  Had continuous EEG in 2014 no find anything abnormal.  MRI brain 2015: no new changes.     Potential Epilepsy Risk Factors:   Pregnancy/Labor/Delivery - full term uncomplicated pregnancy labor and vaginal delivery  Febrile seizures - none  Head injury  - none  CNS infection - none     Stroke - none  Family Hx of Sz - none    PAST MEDICAL HISTORY:   Epilepsy     PAST SURGICAL HISTORY: epilepsy surgery 2008    FAMILY HISTORY:   Family History   Problem Relation Age of Onset    Diabetes Father     Cancer Father     Diabetes Sister     Cancer Paternal Aunt     Diabetes Paternal Uncle     Cancer Paternal Grandmother     Diabetes Paternal Grandfather     Cancer Paternal Grandfather            SOCIAL HISTORY:   Social History     Socioeconomic History    Marital status: Single     Spouse name: Not on file    Number of children: Not on file    Years of education: Not on file    Highest education level: Not on file   Occupational History    Not on file   Social Needs    Financial resource strain: Not on file    Food insecurity:     Worry: Not on file     Inability: Not on file    Transportation needs:     Medical: Not on file     Non-medical: Not on file   Tobacco Use    Smoking status: Never Smoker   Substance and Sexual Activity    Alcohol use: No    Drug use: No    Sexual activity: Not  "on file   Lifestyle    Physical activity:     Days per week: Not on file     Minutes per session: Not on file    Stress: Not on file   Relationships    Social connections:     Talks on phone: Not on file     Gets together: Not on file     Attends Zoroastrian service: Not on file     Active member of club or organization: Not on file     Attends meetings of clubs or organizations: Not on file     Relationship status: Not on file   Other Topics Concern    Not on file   Social History Narrative    Not on file        SUBSTANCE USE:  Social History     Tobacco Use    Smoking status: Never Smoker   Substance and Sexual Activity    Alcohol use: No    Drug use: No    Sexual activity: Not on file      Social History     Tobacco Use    Smoking status: Never Smoker   Substance Use Topics    Alcohol use: No        ALLERGIES: Patient has no known allergies.     /85   Pulse 78   Ht 5' 11" (1.803 m)   BMI 28.69 kg/m²     Higher Cortical Function:    Patient is a well developed, pleasant, well groomed individual appearing their stated age  Oriented - intact to person, place and time and followed two step instruction correctly.    Fund of knowledge was appropriate.    R-L Orientation - Intact   Language - Speech showed impaired verbal fluency and expression.  Some perseveration of speech.  Agnosias, agraphesthesia, or astereognosis - not present.   Extinction with double simultaneous stimulation:        Proximal-distal stimulation - Not present        Right-left stimulation - Not present  Cranial Nerves II - XII:    EOMs were intact with normal smooth and a mild end gaze nystagmus.    PERRLA. D/C     Motor - facial movement was symmetrical and normal.    Facial sensory - Light touch and pin prick sensations were normal.    Hearing was normal to finger rub.  Palate moved well and was symmetrical with normal palatal and oral sensation.    Tongue movement was full & the patient could say "la la la" and "Ka Ka Ka" " without  difficulty. Patient repeated Gnosticist and Congregation without difficulty. Normal power and bulk was found in the massiter and rotator muscles of the neck.  Motor: Power, bulk and tone were normal in all extremities.  Sensory: Light touch, pin prick, vibration and position senses were normal in all extremities.    Coordination:       Rapid alternating movements and rapid finger tapping - normal.       Finger to nose - nl.       Arm roll - symmetrical.    Gait:  Station, gait and tandem walking were done without difficulty and Romberg was negative.    Tremor: resting, postural, intentional - none    Pulses    Carotids - strong without bruits    Peripheral - strong and symmetrical      Model # [id number] M106  Implant Date: 2/21/2019  Is Device palpable in chest wall  Yes  Side of implant    L          Initial  Reprogram   Output current  mA 0.675  0.675  Signal Freq          Hz 30  30  Pulse width        uSec 500  750  Signal on time     Sec 30  30  Signal off time      Min 10.0  10.0    Autostimulation (AS)  Output Current          mA 0.75  0.75  Pulse width        uSec 500  750  Signal on time         Sec 60    Magnet settings  Output current          mA 0.75  0.75  Pulse width        uSec 500  750  Signal on time         Sec 60  60    Tachycardia detect  On/Off  On/off  Threshhold for AS %         IMPRESSION  1. Complex partial  2. GTC  3. Post Left temporal lobe resection  4. S/p VNS implantation    DISPOSITION:   1. Continue clobazam 80 mg per day   2. Continue lacosamide (Vimpat, LCS) : 200 mg 1 am, 1.5 pm  3. Continue lamotrigine (Lamictal, LTG) 600 mg BID (3 tab BID)   4.  Continue Perampanel 2 mg 3 tabs QD   5. VNS interrogated and adjusted as above  6.  RTC in 3 months, or sooner as needed     Dr. Urias was available for this encounter.

## 2019-12-16 ENCOUNTER — TELEPHONE (OUTPATIENT)
Dept: NEUROLOGY | Facility: CLINIC | Age: 44
End: 2019-12-16

## 2019-12-18 ENCOUNTER — TELEPHONE (OUTPATIENT)
Dept: NEUROLOGY | Facility: CLINIC | Age: 44
End: 2019-12-18

## 2019-12-18 NOTE — TELEPHONE ENCOUNTER
Called Patient and clarified Lamictal dose.He states that the dose was increased to 750 mg twice daily.Per Dr Urias,Patient does not need ER.Spoke to [facility] and they will send Patient an additional 150 mg tablet to take twice daily along with the 600 mg.Spoke to Patient and informed him of changes and to look for the 150 mg to equal dose of 750 mg dose.

## 2020-01-06 ENCOUNTER — TELEPHONE (OUTPATIENT)
Dept: NEUROLOGY | Facility: CLINIC | Age: 45
End: 2020-01-06

## 2020-01-06 NOTE — TELEPHONE ENCOUNTER
----- Message from Leeanna Toth sent at 1/6/2020 10:52 AM CST -----  Contact: pts father   Pts dad is calling to speak with the nurse about his medications. Pt took one this morning had to come home from work pt was dizzy and nauseated after taking the medication called lamoTRIgine (LAMICTAL) 200 MG tablet. Pt started taking the 150 yesterday. Can you please call pts dad at [phone] or [phone].    ADRYAN

## 2020-03-06 ENCOUNTER — TELEPHONE (OUTPATIENT)
Dept: NEUROLOGY | Facility: CLINIC | Age: 45
End: 2020-03-06

## 2020-03-06 NOTE — TELEPHONE ENCOUNTER
----- Message from Kenji Workman sent at 3/6/2020  9:35 AM CST -----  Contact: pt @ [phone]  Pt asking to speak w/ Julianna to discuss medications

## 2020-03-06 NOTE — TELEPHONE ENCOUNTER
Patient had 1 recent seizure. He is having trouble tolerating the increased Lamictal so he will stay on his current dose for now. Pts dad will call with any other events and if necessary we will have him come in for a VNS adjustment

## 2020-03-31 ENCOUNTER — TELEPHONE (OUTPATIENT)
Dept: NEUROLOGY | Facility: CLINIC | Age: 45
End: 2020-03-31

## 2020-03-31 NOTE — TELEPHONE ENCOUNTER
I returned pt's father's call. I informed him that the higher risk pt's are diabetics, elderly, heart, cancer and kidney disorders. He will call back for any further concerns      ----- Message from Rohini Castro sent at 3/31/2020  1:55 PM CDT -----  Contact: Nicholas (father) @ [phone] or [phone]  Would like to speak with someone concerning pt with epilepsy and the Corona Virus.  Pt is not having any symptoms just have some questions.  Pls call.

## 2020-05-05 ENCOUNTER — TELEPHONE (OUTPATIENT)
Dept: NEUROLOGY | Facility: CLINIC | Age: 45
End: 2020-05-05

## 2020-05-05 DIAGNOSIS — R56.9 SEIZURES: Primary | ICD-10-CM

## 2020-05-05 NOTE — TELEPHONE ENCOUNTER
----- Message from Rohini Castro sent at 5/5/2020  2:34 PM CDT -----  Contact: Nicholas  (father) @ [phone]  Pt seems to be wobbly.  Would like to know if this may be from his medication.  Pls call.

## 2020-05-05 NOTE — TELEPHONE ENCOUNTER
Left message advising pts dad to take him the lab to check drug levels. Internal orders put in system

## 2020-05-08 ENCOUNTER — TELEPHONE (OUTPATIENT)
Dept: NEUROLOGY | Facility: CLINIC | Age: 45
End: 2020-05-08

## 2020-05-08 NOTE — TELEPHONE ENCOUNTER
Spoke to Nicholas Chávez.advised that we are still waiting on 2 more results and to check back on Monday

## 2020-05-11 ENCOUNTER — TELEPHONE (OUTPATIENT)
Dept: NEUROLOGY | Facility: CLINIC | Age: 45
End: 2020-05-11

## 2020-05-11 ENCOUNTER — PATIENT MESSAGE (OUTPATIENT)
Dept: NEUROLOGY | Facility: CLINIC | Age: 45
End: 2020-05-11

## 2020-05-11 NOTE — TELEPHONE ENCOUNTER
I returned pt's fathers call. The fycompa hasn't resulted yet. He is requesting a virtual visit and Julianna will contact  Him for this.  ----- Message from Margaret Infante sent at 5/11/2020  1:04 PM CDT -----  Contact: self   Pt states that he had blood work done last week and he's calling today to receive his results please contact pt         Contact info

## 2020-05-12 ENCOUNTER — TELEPHONE (OUTPATIENT)
Dept: NEUROLOGY | Facility: CLINIC | Age: 45
End: 2020-05-12

## 2020-05-12 NOTE — TELEPHONE ENCOUNTER
----- Message from Keli Rome RN sent at 5/11/2020  2:22 PM CDT -----  Can you schedule a virtual visit please

## 2020-05-14 ENCOUNTER — OFFICE VISIT (OUTPATIENT)
Dept: NEUROLOGY | Facility: CLINIC | Age: 45
End: 2020-05-14
Payer: COMMERCIAL

## 2020-05-14 DIAGNOSIS — Z96.89 S/P PLACEMENT OF VNS (VAGUS NERVE STIMULATION) DEVICE: Primary | ICD-10-CM

## 2020-05-14 DIAGNOSIS — G40.219 LOCALIZATION-RELATED SYMPTOMATIC EPILEPSY AND EPILEPTIC SYNDROMES WITH COMPLEX PARTIAL SEIZURES, INTRACTABLE, WITHOUT STATUS EPILEPTICUS: ICD-10-CM

## 2020-05-14 DIAGNOSIS — F07.0 TEMPORAL LOBECTOMY BEHAVIOR SYNDROME: ICD-10-CM

## 2020-05-14 PROCEDURE — 99213 OFFICE O/P EST LOW 20 MIN: CPT | Mod: 95,,, | Performed by: PSYCHIATRY & NEUROLOGY

## 2020-05-14 PROCEDURE — 99213 PR OFFICE/OUTPT VISIT, EST, LEVL III, 20-29 MIN: ICD-10-PCS | Mod: 95,,, | Performed by: PSYCHIATRY & NEUROLOGY

## 2020-05-14 NOTE — PROGRESS NOTES
Name: Georgette Nagel Jr.  MRN: [id number]   CSN: [id number]      Date: 05/14/2020    HISTORY OF PRESENT ILLNESS (HPI)  The patient is a 44 y.o. yo RHBM     2020/05/14  He has noticed intermittent dizziness particularily with change in posture.  Dad fell his comprehension is not as good.  He reports two seizures - 5 days ago after missed AM dose.  The second occurred at work when he went to see his supervisor. 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  Starting 2-3 weeks after last visit he began to have the intermittyent dizziness.  Lamictal level is high enough that there may be a pharmacodynamic interaction with lacosamide.  Results for GEORGETTE NAGEL JR. (MRN [id number]) as of 5/14/2020 08:42   Ref. Range 7/13/2017 12:55 4/24/2019 14:03 5/6/2020 11:44   Lacosamide Latest Ref Range: 1.0 - 10.0 mcg/mL 6.8 7.5 10.2 (H)   Lamotrigine Lvl Latest Ref Range: 2.0 - 15.0 ug/mL 12.6 13.2 16.1 (H)   Perampanel (Fycompa) Quant Latest Units: ng/mL 160     Clobazam Latest Ref Range: 30 - 300 ng/mL 894 (H) 916.0 (H) 849.0 (H)   Desmethylclobazam Latest Ref Range: 300 - 3000 ng/mL 5904 (H) 6180.0 (H) 7820.0 (H)     2019/12/12  Since the last visit he had only two seizures and he retained awareness with both.  He has been taking ativan when he goes to football games.  Lamictal levels have been stable and the clearance calculated from the dose/level indicates he is a rapid metabolizer.    2019/08/29  Patient reports three sz since last visit.  All have been partial with no convulsions.  He is tolerating the VNS well.  He has not been using the magnet to block seizures..  He is tolerating the medication well.  In the past he would usually have a seizure when he went to a game.  I have given him 1 mg ativan to take 1 hrs before a game and  This has worked well in preventing seizures.      2019/04/24  Doing well since last visit, no reported seizures. Tolerated initial VNS settings well. Returns today  for continued VNS adjustment. Medication regimen unchanged.     2019/03/27  VNS implanted 2/21/19, tolerated procedure well. He was seen by NSGY in a virtual visit 3/7/19 for his 2 week post-op appointment, incision clean/dry/intact and well approximated at that time, and on exam today. Patient/family report no recent seizures. Continues to take Lacosamide, Clobazam, Lamotrigine, and Perampanel as prescribed. Presents today for first VNS adjustment.     2018/09/13  Patient reports 3 mild CPS since the last visit.  Last visit I gave him a schedule to increase lacosamide in two steps to 200 mg 1½ BID.  When he reached this dose he became dizzy and reduced the morning dose to 1 tab with resolution of the dizzinesss.  Last visit I discussed VNS treatment and today the patient wants to proceed with implanting the device.  He has no other medical problems except allergies.  He does take loratadine which works on the H1 reception similar to Benadryl but has not been been implicated in lowering seizure threshhold.      2018/01/26  The patient reports 3 sz since the last visit.  The most recent may have resulted from missed dose.  No triggers evident for the other 2 sz.  The patient reports he has an aura but can not describe what he perceives.      2017/07/13  In the past 6 months the patient has experienced five seizures (about 1 per month).   The patient was having difficulty expressing himself - speech is halting and disconnected.  He has been taking ativan prior to social event or watching sports to prevent seizure.  He has been taking ativan about 2 times a month.      2016/04/19 and 2017/01/23   Since the last visit the patient has experienced one seizures (12 Jan 2017) .  Meds were increased last time and he is tolerating the new dosages well.  There was no obvious provocative reason.  He has a good strategy for compliance.  He had no concurrent medical problem and had not taken benadryl or other proconvulsive  medication.      Results for GEORGETTE MONCADA (MRN [id number]) as of 1/23/2017 14:26   Ref. Range 4/1/2015 13:20 8/5/2015 14:05 8/21/2015 11:40   Lamotrigine Lvl Latest Ref Range: 2.0 - 15.0 ug/mL 13.4  17.3 (H)   MRI PREVIOUS Unknown  Rpt    Lacosamide Latest Ref Range: 1.0 - 10.0 mcg/mL 8.0           pt has had the following episodes:  2015/4/8 - Pt had just finished a walk.  Eye blinking and stereotypic hand movements.  No generalized convulsions were noted. Didn't lose consciousness.  Lasted 30s to 1 minute.  2015/6/6 - Pt was in his room.  Came to his dad to give him his phone to record what was happening.  Said he has the feeling that he's about to have a seizure.  Says there is no aura per se.  Says a thought that he's about to have a seizure pops into his head, which usually precedes his events.  Said his speech got markedly worse during the event.  Eye blinking lasted 30s to 1 minute and resolved spontaneously.  Also had stereotypic hand movements and eye blinking.  Maintained consciousness throughout episode.  Pt had no memory of the seizure, which was relayed by his dad.    2015/6/8 - Pt was running around a track.  Said he overexerted himself over the last three laps.  Said he had another event immediately after exercise.  Again, this episode was brief (30s-1min) and was characterized by walking in circles, eye blinking, and stereotypic hand motions of rubbing his fingers. Had no intraictal recall.      Previous History (2015/4/1)   The patient was referred for consultation by Dr. Guerrero due to want to transfer care here due to long distance, patient is living [facility].  The patient was accompanied by his mother and father who provided some of the history.      Seizure surgery 2008 for epilepsy. Since then, less frequent and not as strong, still have seizure twice a month with current medication dosage. He is compliant with medication, tolerates medication well, no side effect noticed. On current  regime for about 5 years, change to onfi 2-3 years ago in place of keppra.       Epilepsy History  ED visits: last year, feel someone talking to him  Episodes of SE  Change in meds    Seizure Seminology  Seizure Type 1  Classification:   Aura - feel it is coming, his mind told him that seizure is coming  Ictus  - Nonconv -  - Conv - whole body jerking more on the right side, after a couple of jerk, he is out, first started has tongue biting and incontinence  - Duration - 2 minutes  Post-ictal  - Symptoms: can not talk, sleepiness no headache  - Duration: 5-10 minutes  Age of onset : 6 year old  Current Seizure Frequency -  Initially once week,  Last Seizure: 10 years ago    Seizure Type 2: after seizure  Classification:   Aura - feel it is coming, but not as strong as before  Ictus  - Nonconv -  - Conv - blinking and body jerk more on the arm.   - Duration - 30 sec  Post-ictal  - Symptoms: no sleepiness  - Duration: back to baseline after ictus right away  Age of onset : 29  Current Seizure Frequency -  Last Seizure: a couple weeks ago    sz per month 2012 2013 2014 2015 2016 2017   Doug         Feb         Mar         Apr    1     May         Deny    2     Jul     1    Aug         Sep     1    Oct         Nov         Dec         Tot           Seizure Triggers:   Sleep Deprivation - None  Other medicaitons - None  Psych/stress - excitement can trigger jorge athletic event, when feel frustrated  Photic stimulation - None  Hyperventilation - None  Medical Problems - None  Sensory Stimulation (light, sound, etc) - None  Missed dose of meds - Denies missing meds since last visit though has missed meds in the past which were closely followed by seizures.   Computer use may trigger- sometimes  Exercise/overexertion  may trigger- sometimes    AED Treatments  Present regimen  clobezam (Onfi or Frizium, CLB):  40 mg bid  lacosamide (Vimpat, LCS) :  200 mg bid  lamotrigine (Lamictal, LTG)   600 mg bid  Perampanel: 6 mg  qd    Results for GEORGETTE MONCADA (MRN [id number]) as of 12/16/2015 14:24   Ref. Range 4/1/2015 13:20 8/5/2015 14:05 8/21/2015 11:40   Lamotrigine Lvl Latest Range: 2.0-15.0 ug/mL 13.4  17.3 (H)     Ativan PRN    Prior treatments  carbamazepine (Tegretol, CBZ):  tried in the past  ethosuximide (Zarontin, ESM):  tried in the past  felbamate (felbatol, FBM):   tried in the past  gabapentin (Neurontin, GPN):  tried in the past  levetiracetam (Keppra, LEV):   tried in the past  phenytoin (Dilantin, PHT):   tried in the past  primidone (Mysoline, PRM):   tried in the past  topiramate (Topamax, TPM):   tried in the past  valproic acid (Depakote, VPA):  tried in the past   clorazepate (Tranxene, CLZ):   Stopped previous visits.     Not tried  acetazolamide (Diamox, AZM)  amantadine  eslicarbazine (Aptiom, ESL)  methsuximide (Celontin, MSM)  methyphenytoin (Mesantion, MHT)  oxcarbazepine (Trileptal OXC)  perampanel (Fycompa, FCP)   phenobarbital (Pb)  pregabalin (Lyrica, PGB)  retigabine (Potiga, RTG)  rufinamide (Banzel, RUF)  tiagabine (Gabatril,  TGB)  viagabatrin, (Sabril, VGB)  vagal nerve stimulator (VNS)  zonisamide (Zonegran, ZNA)  Benzodiazepines  diazepam - rectal (Diastatl)  diazepam - oral (Valium, DZ)  clonazepam (Klonopin, CZP)  Brain Stimulation  Vagal Nerve Stimulation-n/a  DBS- n/a    Compliance method  Memory - yes  Mom or Spouse - Yes  Pill Box - no  Henriqeu calendar - no  Turn over medication bottle - no  Phone alarm - no    Seizure Evaluation  EEG Routine -   EEG Ambulatory -   EEG\Video Monitoring - 2014  MRI/MRA - 2015  CT/CTA Scan -   PET Scan -   Neuropsychological evaluation -   DEXA Scan  Had continuous EEG in 2014 no find anything abnormal.  MRI brain 2015: no new changes.     Potential Epilepsy Risk Factors:   Pregnancy/Labor/Delivery - full term uncomplicated pregnancy labor and vaginal delivery  Febrile seizures - none  Head injury  - none  CNS infection - none     Stroke - none  Family Hx of Sz -  none    PAST MEDICAL HISTORY:   Epilepsy     PAST SURGICAL HISTORY: epilepsy surgery 2008    FAMILY HISTORY:   Family History   Problem Relation Age of Onset    Diabetes Father     Cancer Father     Diabetes Sister     Cancer Paternal Aunt     Diabetes Paternal Uncle     Cancer Paternal Grandmother     Diabetes Paternal Grandfather     Cancer Paternal Grandfather            SOCIAL HISTORY:   Social History     Socioeconomic History    Marital status: Single     Spouse name: Not on file    Number of children: Not on file    Years of education: Not on file    Highest education level: Not on file   Occupational History    Not on file   Social Needs    Financial resource strain: Not on file    Food insecurity:     Worry: Not on file     Inability: Not on file    Transportation needs:     Medical: Not on file     Non-medical: Not on file   Tobacco Use    Smoking status: Never Smoker   Substance and Sexual Activity    Alcohol use: No    Drug use: No    Sexual activity: Not on file   Lifestyle    Physical activity:     Days per week: Not on file     Minutes per session: Not on file    Stress: Not on file   Relationships    Social connections:     Talks on phone: Not on file     Gets together: Not on file     Attends Restorationist service: Not on file     Active member of club or organization: Not on file     Attends meetings of clubs or organizations: Not on file     Relationship status: Not on file   Other Topics Concern    Not on file   Social History Narrative    Not on file        SUBSTANCE USE:  Social History     Tobacco Use    Smoking status: Never Smoker   Substance and Sexual Activity    Alcohol use: No    Drug use: No    Sexual activity: Not on file      Social History     Tobacco Use    Smoking status: Never Smoker   Substance Use Topics    Alcohol use: No        ALLERGIES: Patient has no known allergies.     There were no vitals taken for this visit.    Higher Cortical Function:   "  Patient is a well developed, pleasant, well groomed individual appearing their stated age  Oriented - intact to person, place and time and followed two step instruction correctly.    Fund of knowledge was appropriate.    R-L Orientation - Intact   Language - Speech showed impaired verbal fluency and expression.  Some perseveration of speech.  Agnosias, agraphesthesia, or astereognosis - not present.   Extinction with double simultaneous stimulation:        Proximal-distal stimulation - Not present        Right-left stimulation - Not present  Cranial Nerves II - XII:    EOMs were intact with normal smooth and a mild end gaze nystagmus.    PERRLA. D/C     Motor - facial movement was symmetrical and normal.    Facial sensory - Light touch and pin prick sensations were normal.    Hearing was normal to finger rub.  Palate moved well and was symmetrical with normal palatal and oral sensation.    Tongue movement was full & the patient could say "la la la" and "Ka Ka Ka" without  difficulty. Patient repeated Sabianist and Sabianism without difficulty. Normal power and bulk was found in the massiter and rotator muscles of the neck.  Motor: Power, bulk and tone were normal in all extremities.  Sensory: Light touch, pin prick, vibration and position senses were normal in all extremities.    Coordination:       Rapid alternating movements and rapid finger tapping - normal.       Finger to nose - nl.       Arm roll - symmetrical.    Gait:  Station, gait and tandem walking were done without difficulty and Romberg was negative.    Tremor: resting, postural, intentional - none    Pulses    Carotids - strong without bruits    Peripheral - strong and symmetrical      Model # [id number] M106  Implant Date: 2/21/2019  Is Device palpable in chest wall  Yes  Side of implant    L          Initial  Reprogram   Output current  mA 0.675  0.675  Signal Freq          Hz 30  30  Pulse width        uSec 500  750 >   Signal on time    "  Sec 30  30  Signal off time      Min 10.0  10.0    Autostimulation (AS)  Output Current          mA 0.75  0.75  Pulse width        uSec 500  750  Signal on time         Sec 60    Magnet settings  Output current          mA 0.75  0.75  Pulse width        uSec 500  750  Signal on time         Sec 60  60    Tachycardia detect  On/Off  On/off  Threshhold for AS %         IMPRESSION  1. Complex partial  2. GTC  3. Post Left temporal lobe resection  4. S/p VNS implantation  5. pharmacodynamic interaction of lamotrigine with lacosamide.    DISPOSITION:   1. Continue clobazam 80 mg per day   2. Continue lacosamide (Vimpat, LCS) : 200 mg 1 am, 1.5 pm  3. Continue lamotrigine (Lamictal, LTG) 200 mg tabs but reduce to 3 tab BID (eliminate ½ tab in evening)  4.  Continue Perampanel 2 mg 3 tabs QD   5. VNS not interrogated - schedule to be done  6.  RTC in 3 months, or sooner as needed     Dr. Urias was available for this encounter.

## 2020-05-18 ENCOUNTER — TELEPHONE (OUTPATIENT)
Dept: NEUROLOGY | Facility: CLINIC | Age: 45
End: 2020-05-18

## 2020-05-18 NOTE — TELEPHONE ENCOUNTER
I returned pt's fathers call. He has several follow up questions from the last visit with Dr. Urias. He also needs to have the vns adjusted as discussed

## 2020-06-01 ENCOUNTER — TELEPHONE (OUTPATIENT)
Dept: NEUROLOGY | Facility: CLINIC | Age: 45
End: 2020-06-01

## 2020-06-01 NOTE — TELEPHONE ENCOUNTER
----- Message from Yair Hernandez sent at 6/1/2020  8:33 AM CDT -----  Contact: Patient @ [phone]  Patient requesting a return call about a possible reaction to medication, pls call to discuss further

## 2020-06-01 NOTE — TELEPHONE ENCOUNTER
Spoke to pts dad. Patient is still experiencing some dizziness and would like to consider going down to 2 Fycompa a day. He will discuss further with Dr Urias at appointment on 6/10

## 2020-06-02 RX ORDER — LACOSAMIDE 200 MG/1
TABLET, FILM COATED ORAL
Qty: 270 TABLET | Refills: 1 | Status: SHIPPED | OUTPATIENT
Start: 2020-06-02 | End: 2020-08-12 | Stop reason: SDUPTHER

## 2020-06-10 ENCOUNTER — TELEPHONE (OUTPATIENT)
Dept: NEUROLOGY | Facility: CLINIC | Age: 45
End: 2020-06-10

## 2020-06-10 ENCOUNTER — OFFICE VISIT (OUTPATIENT)
Dept: NEUROLOGY | Facility: CLINIC | Age: 45
End: 2020-06-10
Payer: COMMERCIAL

## 2020-06-10 VITALS
WEIGHT: 194.25 LBS | HEART RATE: 81 BPM | BODY MASS INDEX: 27.19 KG/M2 | DIASTOLIC BLOOD PRESSURE: 75 MMHG | SYSTOLIC BLOOD PRESSURE: 118 MMHG | HEIGHT: 71 IN

## 2020-06-10 DIAGNOSIS — G40.219 LOCALIZATION-RELATED SYMPTOMATIC EPILEPSY AND EPILEPTIC SYNDROMES WITH COMPLEX PARTIAL SEIZURES, INTRACTABLE, WITHOUT STATUS EPILEPTICUS: ICD-10-CM

## 2020-06-10 DIAGNOSIS — F07.0 TEMPORAL LOBECTOMY BEHAVIOR SYNDROME: Primary | ICD-10-CM

## 2020-06-10 PROCEDURE — 99999 PR PBB SHADOW E&M-EST. PATIENT-LVL III: ICD-10-PCS | Mod: PBBFAC,,, | Performed by: PSYCHIATRY & NEUROLOGY

## 2020-06-10 PROCEDURE — 99213 OFFICE O/P EST LOW 20 MIN: CPT | Mod: S$GLB,,, | Performed by: PSYCHIATRY & NEUROLOGY

## 2020-06-10 PROCEDURE — 99213 PR OFFICE/OUTPT VISIT, EST, LEVL III, 20-29 MIN: ICD-10-PCS | Mod: S$GLB,,, | Performed by: PSYCHIATRY & NEUROLOGY

## 2020-06-10 PROCEDURE — 3008F BODY MASS INDEX DOCD: CPT | Mod: S$GLB,,, | Performed by: PSYCHIATRY & NEUROLOGY

## 2020-06-10 PROCEDURE — 99999 PR PBB SHADOW E&M-EST. PATIENT-LVL III: CPT | Mod: PBBFAC,,, | Performed by: PSYCHIATRY & NEUROLOGY

## 2020-06-10 PROCEDURE — 3008F PR BODY MASS INDEX (BMI) DOCUMENTED: ICD-10-PCS | Mod: S$GLB,,, | Performed by: PSYCHIATRY & NEUROLOGY

## 2020-06-10 RX ORDER — LAMOTRIGINE 200 MG/1
300 TABLET ORAL 2 TIMES DAILY
Qty: 270 TABLET | Refills: 3 | Status: SHIPPED | OUTPATIENT
Start: 2020-06-10 | End: 2020-06-10

## 2020-06-10 RX ORDER — LAMOTRIGINE 200 MG/1
600 TABLET ORAL 2 TIMES DAILY
COMMUNITY
End: 2020-08-12 | Stop reason: SDUPTHER

## 2020-06-10 RX ORDER — CLOBAZAM 20 MG/1
40 TABLET ORAL 2 TIMES DAILY
Qty: 360 TABLET | Refills: 2 | Status: CANCELLED | OUTPATIENT
Start: 2020-06-10

## 2020-06-10 RX ORDER — LAMOTRIGINE 200 MG/1
600 TABLET ORAL 2 TIMES DAILY
Qty: 540 TABLET | Refills: 3 | Status: CANCELLED | OUTPATIENT
Start: 2020-06-10

## 2020-06-10 NOTE — PROGRESS NOTES
Name: Georgette Nagel Jr.  MRN: [id number]   CSN: [id number]      Date: 06/10/2020    HISTORY OF PRESENT ILLNESS (HPI)  The patient [facility] 44 [facility]     2020/05/14  He has noticed intermittent dizziness particularily with change in posture.  Dad fell his comprehension is not as good.  He reports two seizures - 5 days ago after missed AM dose.  The second occurred at work when he went to see his supervisor. 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  Starting 2-3 weeks after last visit he began to have the intermittyent dizziness.  Lamictal level is high enough that there may be a pharmacodynamic interaction with lacosamide.  Results for GEORGETTE NAGEL JR. (MRN [id number]) as of 5/14/2020 08:42   Ref. Range 7/13/2017 12:55 4/24/2019 14:03 5/6/2020 11:44   Lacosamide Latest Ref Range: 1.0 - 10.0 mcg/mL 6.8 7.5 10.2 (H)   Lamotrigine Lvl Latest Ref Range: 2.0 - 15.0 ug/mL 12.6 13.2 16.1 (H)   Perampanel (Fycompa) Quant Latest Units: ng/mL 160     Clobazam Latest Ref Range: 30 - 300 ng/mL 894 (H) 916.0 (H) 849.0 (H)   Desmethylclobazam Latest Ref Range: 300 - 3000 ng/mL 5904 (H) 6180.0 (H) 7820.0 (H)     2019/12/12  Since the last visit he had only two seizures and he retained awareness with both.  He has been taking ativan when he goes to football games.  Lamictal levels have been stable and the clearance calculated from the dose/level indicates he is a rapid metabolizer.    2019/08/29  Patient reports three sz since last visit.  All have been partial with no convulsions.  He is tolerating the VNS well.  He has not been using the magnet to block seizures..  He is tolerating the medication well.  In the past he would usually have a seizure when he went to a game.  I have given him 1 mg ativan to take 1 hrs before a game and  This has worked well in preventing seizures.      2019/04/24  Doing well since last visit, no reported seizures. Tolerated initial VNS settings well. Returns today  Reviewed PTA medication list with patient/caregiver and patient/caregiver denies any additional medications. Patient admits to having a responsible adult care for them at home for at least 24 hours after surgery. Patient encouraged to use gown warming system and informed that using said warming gown to regulate body temperature prior to a procedure has been shown to help reduce the risks of blood clots and infection. Dual skin assessment & fall risk band verification completed with Madeleine Alvarez RN. for continued VNS adjustment. Medication regimen unchanged.     2019/03/27  VNS implanted 2/21/19, tolerated procedure well. He was seen by NSGY in a virtual visit 3/7/19 for his 2 week post-op appointment, incision clean/dry/intact and well approximated at that time, and on exam today. Patient/family report no recent seizures. Continues to take Lacosamide, Clobazam, Lamotrigine, and Perampanel as prescribed. Presents today for first VNS adjustment.     2018/09/13  Patient reports 3 mild CPS since the last visit.  Last visit I gave him a schedule to increase lacosamide in two steps to 200 mg 1½ BID.  When he reached this dose he became dizzy and reduced the morning dose to 1 tab with resolution of the dizzinesss.  Last visit I discussed VNS treatment and today the patient wants to proceed with implanting the device.  He has no other medical problems except allergies.  He does take loratadine which works on the H1 reception similar to Benadryl but has not been been implicated in lowering seizure threshhold.      2018/01/26  The patient reports 3 sz since the last visit.  The most recent may have resulted from missed dose.  No triggers evident for the other 2 sz.  The patient reports he has an aura but can not describe what he perceives.      2017/07/13  In the past 6 months the patient has experienced five seizures (about 1 per month).   The patient was having difficulty expressing himself - speech is halting and disconnected.  He has been taking ativan prior to social event or watching sports to prevent seizure.  He has been taking ativan about 2 times a month.      2016/04/19 and 2017/01/23   Since the last visit the patient has experienced one seizures (12 Jan 2017) .  Meds were increased last time and he is tolerating the new dosages well.  There was no obvious provocative reason.  He has a good strategy for compliance.  He had no concurrent medical problem and had not taken benadryl or other proconvulsive  medication.      Results for GEORGETTE MONCADA (MRN [id number]) as of 1/23/2017 14:26   Ref. Range 4/1/2015 13:20 8/5/2015 14:05 8/21/2015 11:40   Lamotrigine Lvl Latest Ref Range: 2.0 - 15.0 ug/mL 13.4  17.3 (H)   MRI PREVIOUS Unknown  Rpt    Lacosamide Latest Ref Range: 1.0 - 10.0 mcg/mL 8.0           pt has had the following episodes:  2015/4/8 - Pt had just finished a walk.  Eye blinking and stereotypic hand movements.  No generalized convulsions were noted. Didn't lose consciousness.  Lasted 30s to 1 minute.  2015/6/6 - Pt was in his room.  Came to his dad to give him his phone to record what was happening.  Said he has the feeling that he's about to have a seizure.  Says there is no aura per se.  Says a thought that he's about to have a seizure pops into his head, which usually precedes his events.  Said his speech got markedly worse during the event.  Eye blinking lasted 30s to 1 minute and resolved spontaneously.  Also had stereotypic hand movements and eye blinking.  Maintained consciousness throughout episode.  Pt had no memory of the seizure, which was relayed by his dad.    2015/6/8 - Pt was running around a track.  Said he overexerted himself over the last three laps.  Said he had another event immediately after exercise.  Again, this episode was brief (30s-1min) and was characterized by walking in circles, eye blinking, and stereotypic hand motions of rubbing his fingers. Had no intraictal recall.      Previous History (2015/4/1)   The patient was referred for consultation by Dr. Guerrero due to want to transfer care here due to long distance, patient is living [facility].  The patient was accompanied by his mother and father who provided some of the history.      Seizure surgery 2008 for epilepsy. Since then, less frequent and not as strong, still have seizure twice a month with current medication dosage. He is compliant with medication, tolerates medication well, no side effect noticed. On current  regime for about 5 years, change to onfi 2-3 years ago in place of keppra.       Epilepsy History  ED visits: last year, feel someone talking to him  Episodes of SE  Change in meds    Seizure Seminology  Seizure Type 1  Classification:   Aura - feel it is coming, his mind told him that seizure is coming  Ictus  - Nonconv -  - Conv - whole body jerking more on the right side, after a couple of jerk, he is out, first started has tongue biting and incontinence  - Duration - 2 minutes  Post-ictal  - Symptoms: can not talk, sleepiness no headache  - Duration: 5-10 minutes  Age of onset : 6 year old  Current Seizure Frequency -  Initially once week,  Last Seizure: 10 years ago    Seizure Type 2: after seizure  Classification:   Aura - feel it is coming, but not as strong as before  Ictus  - Nonconv -  - Conv - blinking and body jerk more on the arm.   - Duration - 30 sec  Post-ictal  - Symptoms: no sleepiness  - Duration: back to baseline after ictus right away  Age of onset : 29  Current Seizure Frequency -  Last Seizure: a couple weeks ago    sz per month 2012 2013 2014 2015 2016 2017   Doug         Feb         Mar         Apr    1     May         Deny    2     Jul     1    Aug         Sep     1    Oct         Nov         Dec         Tot           Seizure Triggers:   Sleep Deprivation - None  Other medicaitons - None  Psych/stress - excitement can trigger jorge athletic event, when feel frustrated  Photic stimulation - None  Hyperventilation - None  Medical Problems - None  Sensory Stimulation (light, sound, etc) - None  Missed dose of meds - Denies missing meds since last visit though has missed meds in the past which were closely followed by seizures.   Computer use may trigger- sometimes  Exercise/overexertion  may trigger- sometimes    AED Treatments  Present regimen  clobezam (Onfi or Frizium, CLB):  40 mg bid  lacosamide (Vimpat, LCS) :  200 mg bid  lamotrigine (Lamictal, LTG)   600 mg bid  Perampanel: 6 mg  qd    Results for GEORGETTE MONCADA (MRN [id number]) as of 12/16/2015 14:24   Ref. Range 4/1/2015 13:20 8/5/2015 14:05 8/21/2015 11:40   Lamotrigine Lvl Latest Range: 2.0-15.0 ug/mL 13.4  17.3 (H)     Ativan PRN    Prior treatments  carbamazepine (Tegretol, CBZ):  tried in the past  ethosuximide (Zarontin, ESM):  tried in the past  felbamate (felbatol, FBM):   tried in the past  gabapentin (Neurontin, GPN):  tried in the past  levetiracetam (Keppra, LEV):   tried in the past  phenytoin (Dilantin, PHT):   tried in the past  primidone (Mysoline, PRM):   tried in the past  topiramate (Topamax, TPM):   tried in the past  valproic acid (Depakote, VPA):  tried in the past   clorazepate (Tranxene, CLZ):   Stopped previous visits.     Not tried  acetazolamide (Diamox, AZM)  amantadine  eslicarbazine (Aptiom, ESL)  methsuximide (Celontin, MSM)  methyphenytoin (Mesantion, MHT)  oxcarbazepine (Trileptal OXC)  perampanel (Fycompa, FCP)   phenobarbital (Pb)  pregabalin (Lyrica, PGB)  retigabine (Potiga, RTG)  rufinamide (Banzel, RUF)  tiagabine (Gabatril,  TGB)  viagabatrin, (Sabril, VGB)  vagal nerve stimulator (VNS)  zonisamide (Zonegran, ZNA)  Benzodiazepines  diazepam - rectal (Diastatl)  diazepam - oral (Valium, DZ)  clonazepam (Klonopin, CZP)  Brain Stimulation  Vagal Nerve Stimulation-n/a  DBS- n/a    Compliance method  Memory - yes  Mom or Spouse - Yes  Pill Box - no  Henrique calendar - no  Turn over medication bottle - no  Phone alarm - no    Seizure Evaluation  EEG Routine -   EEG Ambulatory -   EEG\Video Monitoring - 2014  MRI/MRA - 2015  CT/CTA Scan -   PET Scan -   Neuropsychological evaluation -   DEXA Scan  Had continuous EEG in 2014 no find anything abnormal.  MRI brain 2015: no new changes.     Potential Epilepsy Risk Factors:   Pregnancy/Labor/Delivery - full term uncomplicated pregnancy labor and vaginal delivery  Febrile seizures - none  Head injury  - none  CNS infection - none     Stroke - none  Family Hx of Sz -  "none    PAST MEDICAL HISTORY:   Epilepsy     PAST SURGICAL HISTORY: epilepsy surgery 2008    FAMILY HISTORY:   Family History   Problem Relation Age of Onset    Diabetes Father     Cancer Father     Diabetes Sister     Cancer Paternal Aunt     Diabetes Paternal Uncle     Cancer Paternal Grandmother     Diabetes Paternal Grandfather     Cancer Paternal Grandfather            SOCIAL HISTORY:   Social History     Socioeconomic History    Marital status: Single     Spouse name: Not on file    Number of children: Not on file    Years of education: Not on file    Highest education level: Not on file   Occupational History    Not on file   Social Needs    Financial resource strain: Not on file    Food insecurity:     Worry: Not on file     Inability: Not on file    Transportation needs:     Medical: Not on file     Non-medical: Not on file   Tobacco Use    Smoking status: Never Smoker   Substance and Sexual Activity    Alcohol use: No    Drug use: No    Sexual activity: Not on file   Lifestyle    Physical activity:     Days per week: Not on file     Minutes per session: Not on file    Stress: Not on file   Relationships    Social connections:     Talks on phone: Not on file     Gets together: Not on file     Attends Jewish service: Not on file     Active member of club or organization: Not on file     Attends meetings of clubs or organizations: Not on file     Relationship status: Not on file   Other Topics Concern    Not on file   Social History Narrative    Not on file        SUBSTANCE USE:  Social History     Tobacco Use    Smoking status: Never Smoker   Substance and Sexual Activity    Alcohol use: No    Drug use: No    Sexual activity: Not on file      Social History     Tobacco Use    Smoking status: Never Smoker   Substance Use Topics    Alcohol use: No        ALLERGIES: Patient has no known allergies.     /75   Pulse 81   Ht 5' 11" (1.803 m)   Wt 88.1 kg (194 lb 3.6 oz)   " "BMI 27.09 kg/m²     Higher Cortical Function:    Patient is a well developed, pleasant, well groomed individual appearing their stated age  Oriented - intact to person, place and time and followed two step instruction correctly.    Fund of knowledge was appropriate.    R-L Orientation - Intact   Language - Speech showed impaired verbal fluency and expression.  Some perseveration of speech.  Agnosias, agraphesthesia, or astereognosis - not present.   Extinction with double simultaneous stimulation:        Proximal-distal stimulation - Not present        Right-left stimulation - Not present  Cranial Nerves II - XII:    EOMs were intact with normal smooth and a mild end gaze nystagmus.    PERRLA. D/C     Motor - facial movement was symmetrical and normal.    Facial sensory - Light touch and pin prick sensations were normal.    Hearing was normal to finger rub.  Palate moved well and was symmetrical with normal palatal and oral sensation.    Tongue movement was full & the patient could say "la la la" and "Ka Ka Ka" without  difficulty. Patient repeated Scientology and Jehovah's witness without difficulty. Normal power and bulk was found in the massiter and rotator muscles of the neck.  Motor: Power, bulk and tone were normal in all extremities.  Sensory: Light touch, pin prick, vibration and position senses were normal in all extremities.    Coordination:       Rapid alternating movements and rapid finger tapping - normal.       Finger to nose - nl.       Arm roll - symmetrical.    Gait:  Station, gait and tandem walking were done without difficulty and Romberg was negative.    Tremor: resting, postural, intentional - none    Pulses    Carotids - strong without bruits    Peripheral - strong and symmetrical      Model # [id number] M106  Implant Date: 2/21/2019  Is Device palpable in chest wall  Yes  Side of implant    L          Initial  Reprogram   Output current  mA 0.675  0.675  Signal Freq          Hz 30  30  Pulse width       "  uSec 500  750 >   Signal on time     Sec 30  30  Signal off time      Min 10.0  10.0    Autostimulation (AS)  Output Current          mA 0.75  0.75  Pulse width        uSec 500  750  Signal on time         Sec 60    Magnet settings  Output current          mA 0.75  0.75  Pulse width        uSec 500  750  Signal on time         Sec 60  60    Tachycardia detect  On/Off  On/off  Threshhold for AS %         IMPRESSION  1. Complex partial  2. GTC  3. Post Left temporal lobe resection  4. S/p VNS implantation  5. pharmacodynamic interaction of lamotrigine with lacosamide.    DISPOSITION:   1. Reduce clobazam from 80 mg to 60 mg per day for 3 weeks then on Jul 1 increase to 3½ tabs a day  2. Continue lacosamide (Vimpat, LCS) : 200 mg 1 am, 1.5 pm  3. Continue lamotrigine (Lamictal, LTG) 200 mg tabs but reduce to 3 tab BID (eliminate ½ tab in evening)  4.  Continue Perampanel 2 mg 3 tabs QD   5. VNS interrogated and functioning well   6.  RTC in 3 months, or sooner as needed

## 2020-06-10 NOTE — PATIENT INSTRUCTIONS
Reduce Onfi 20 mg from 4 tabs a day to 3 tabs a day until Jul 1 then increase to 3½ tabs per day.

## 2020-06-10 NOTE — TELEPHONE ENCOUNTER
----- Message from Aron Gillespie sent at 6/10/2020  4:35 PM CDT -----  Contact: Pt. [phone]  The patient would like to speak to someone regarding his medications. Please contact the patient to discuss further.

## 2020-06-12 DIAGNOSIS — R56.9 SEIZURE: Primary | ICD-10-CM

## 2020-06-30 ENCOUNTER — TELEPHONE (OUTPATIENT)
Dept: NEUROLOGY | Facility: CLINIC | Age: 45
End: 2020-06-30

## 2020-06-30 NOTE — TELEPHONE ENCOUNTER
----- Message from Destiny Medellin sent at 6/30/2020 10:04 AM CDT -----      Need Ana to call concerning medication that was reduced at last visit    @# [phone]   @# [phone]

## 2020-06-30 NOTE — TELEPHONE ENCOUNTER
Spoke to Nicholas Meyers, confirmed that Nicholas Andrews is to increase Onfi up to 3 1/2 tabs on July 1. Verbalized understanding

## 2020-08-10 NOTE — TELEPHONE ENCOUNTER
----- Message from Rohinilewis Castro sent at 8/10/2020  9:17 AM CDT -----  Contact: self @ [phone]  Pt says his prescription for FYCOMPA 2 mg tablet was sent to [facility] and they are having a problem getting the medication.  Pt says he is almost out of medication.  Pt would like the prescription sent to his mail order pharmacy.  Pt would like someone to call him.    [facility] - [place], [place] - [address] AT [address]           [phone] (Phone)     [phone] (Fax)

## 2020-08-12 ENCOUNTER — OFFICE VISIT (OUTPATIENT)
Dept: NEUROLOGY | Facility: CLINIC | Age: 45
End: 2020-08-12
Payer: COMMERCIAL

## 2020-08-12 VITALS
WEIGHT: 191.81 LBS | HEIGHT: 71 IN | DIASTOLIC BLOOD PRESSURE: 78 MMHG | HEART RATE: 79 BPM | BODY MASS INDEX: 26.85 KG/M2 | SYSTOLIC BLOOD PRESSURE: 124 MMHG

## 2020-08-12 DIAGNOSIS — G40.219 LOCALIZATION-RELATED SYMPTOMATIC EPILEPSY AND EPILEPTIC SYNDROMES WITH COMPLEX PARTIAL SEIZURES, INTRACTABLE, WITHOUT STATUS EPILEPTICUS: Primary | ICD-10-CM

## 2020-08-12 PROCEDURE — 99213 OFFICE O/P EST LOW 20 MIN: CPT | Mod: S$GLB,,, | Performed by: PSYCHIATRY & NEUROLOGY

## 2020-08-12 PROCEDURE — 3008F PR BODY MASS INDEX (BMI) DOCUMENTED: ICD-10-PCS | Mod: S$GLB,,, | Performed by: PSYCHIATRY & NEUROLOGY

## 2020-08-12 PROCEDURE — 95977 PR ELEC ANALYSIS, IMPLT NEURO PULSE GEN, W/PRGRM, CMPLX CRAN NERVE: ICD-10-PCS | Mod: S$GLB,,, | Performed by: PSYCHIATRY & NEUROLOGY

## 2020-08-12 PROCEDURE — 99999 PR PBB SHADOW E&M-EST. PATIENT-LVL III: ICD-10-PCS | Mod: PBBFAC,,, | Performed by: PSYCHIATRY & NEUROLOGY

## 2020-08-12 PROCEDURE — 3008F BODY MASS INDEX DOCD: CPT | Mod: S$GLB,,, | Performed by: PSYCHIATRY & NEUROLOGY

## 2020-08-12 PROCEDURE — 99213 PR OFFICE/OUTPT VISIT, EST, LEVL III, 20-29 MIN: ICD-10-PCS | Mod: S$GLB,,, | Performed by: PSYCHIATRY & NEUROLOGY

## 2020-08-12 PROCEDURE — 99999 PR PBB SHADOW E&M-EST. PATIENT-LVL III: CPT | Mod: PBBFAC,,, | Performed by: PSYCHIATRY & NEUROLOGY

## 2020-08-12 PROCEDURE — 95977 ALYS CPLX CN NPGT PRGRMG: CPT | Mod: S$GLB,,, | Performed by: PSYCHIATRY & NEUROLOGY

## 2020-08-12 RX ORDER — LACOSAMIDE 200 MG/1
300 TABLET, FILM COATED ORAL EVERY 12 HOURS
Qty: 270 TABLET | Refills: 1 | Status: SHIPPED | OUTPATIENT
Start: 2020-08-12 | End: 2021-01-21 | Stop reason: SDUPTHER

## 2020-08-12 RX ORDER — LORAZEPAM 1 MG/1
1 TABLET ORAL EVERY 12 HOURS PRN
Qty: 15 TABLET | Refills: 0 | Status: SHIPPED | OUTPATIENT
Start: 2020-08-12 | End: 2021-05-28 | Stop reason: SDUPTHER

## 2020-08-12 RX ORDER — LAMOTRIGINE 200 MG/1
600 TABLET ORAL 2 TIMES DAILY
Qty: 540 TABLET | Refills: 3 | Status: SHIPPED | OUTPATIENT
Start: 2020-08-12 | End: 2021-01-21 | Stop reason: SDUPTHER

## 2020-08-12 NOTE — PROGRESS NOTES
Name: Georgette Nagel Jr.  MRN: [id number]   CSN: [id number]      Date: 08/12/2020    HISTORY OF PRESENT ILLNESS (HPI)  The patient is a 44 y.o. yo RHBM       2020/08/12  Patient may have had a seizure on Jun 30.  He had a typical warning, swiped the VNS and did not lose awareness.  That is the only symptoms he experienced.  He is not experiencing any AEs from the meds. He does not have any other medical issues.      Results for GEORGETTE NAGEL JR. (MRN [id number]) as of 8/12/2020 11:47   Ref. Range 5/6/2020 11:44 7/31/2020 10:06   Clobazam Latest Ref Range: 30 - 300 ng/mL 849.0 (H) 805.0 (H)   Lacosamide Latest Ref Range: 1.0 - 10.0 mcg/mL 10.2 (H) 10.5 (H)   Lamotrigine Lvl Latest Ref Range: 2.0 - 15.0 ug/mL 16.1 (H) 14.5       2020/05/14  He has noticed intermittent dizziness particularily with change in posture.  Dad fell his comprehension is not as good.  He reports two seizures - 5 days ago after missed AM dose.  The second occurred at work when he went to see his supervisor. 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  Starting 2-3 weeks after last visit he began to have the intermittyent dizziness.  Lamictal level is high enough that there may be a pharmacodynamic interaction with lacosamide.  Results for GEORGETTE NAGEL JR. (MRN [id number]) as of 5/14/2020 08:42   Ref. Range 7/13/2017 12:55 4/24/2019 14:03 5/6/2020 11:44   Lacosamide Latest Ref Range: 1.0 - 10.0 mcg/mL 6.8 7.5 10.2 (H)   Lamotrigine Lvl Latest Ref Range: 2.0 - 15.0 ug/mL 12.6 13.2 16.1 (H)   Perampanel (Fycompa) Quant Latest Units: ng/mL 160     Clobazam Latest Ref Range: 30 - 300 ng/mL 894 (H) 916.0 (H) 849.0 (H)   Desmethylclobazam Latest Ref Range: 300 - 3000 ng/mL 5904 (H) 6180.0 (H) 7820.0 (H)     2019/12/12  Since the last visit he had only two seizures and he retained awareness with both.  He has been taking ativan when he goes to football games.  Lamictal levels have been stable and the clearance  calculated from the dose/level indicates he is a rapid metabolizer.    2019/08/29  Patient reports three sz since last visit.  All have been partial with no convulsions.  He is tolerating the VNS well.  He has not been using the magnet to block seizures..  He is tolerating the medication well.  In the past he would usually have a seizure when he went to a game.  I have given him 1 mg ativan to take 1 hrs before a game and  This has worked well in preventing seizures.      2019/04/24  Doing well since last visit, no reported seizures. Tolerated initial VNS settings well. Returns today for continued VNS adjustment. Medication regimen unchanged.     2019/03/27  VNS implanted 2/21/19, tolerated procedure well. He was seen by NSGY in a virtual visit 3/7/19 for his 2 week post-op appointment, incision clean/dry/intact and well approximated at that time, and on exam today. Patient/family report no recent seizures. Continues to take Lacosamide, Clobazam, Lamotrigine, and Perampanel as prescribed. Presents today for first VNS adjustment.     2018/09/13  Patient reports 3 mild CPS since the last visit.  Last visit I gave him a schedule to increase lacosamide in two steps to 200 mg 1½ BID.  When he reached this dose he became dizzy and reduced the morning dose to 1 tab with resolution of the dizzinesss.  Last visit I discussed VNS treatment and today the patient wants to proceed with implanting the device.  He has no other medical problems except allergies.  He does take loratadine which works on the H1 reception similar to Benadryl but has not been been implicated in lowering seizure threshhold.      2018/01/26  The patient reports 3 sz since the last visit.  The most recent may have resulted from missed dose.  No triggers evident for the other 2 sz.  The patient reports he has an aura but can not describe what he perceives.      2017/07/13  In the past 6 months the patient has experienced five seizures (about 1 per month).    The patient was having difficulty expressing himself - speech is halting and disconnected.  He has been taking ativan prior to social event or watching sports to prevent seizure.  He has been taking ativan about 2 times a month.      2016/04/19 and 2017/01/23   Since the last visit the patient has experienced one seizures (12 Jan 2017) .  Meds were increased last time and he is tolerating the new dosages well.  There was no obvious provocative reason.  He has a good strategy for compliance.  He had no concurrent medical problem and had not taken benadryl or other proconvulsive medication.      Results for GEORGETTE MONCADA (MRN [id number]) as of 1/23/2017 14:26   Ref. Range 4/1/2015 13:20 8/5/2015 14:05 8/21/2015 11:40   Lamotrigine Lvl Latest Ref Range: 2.0 - 15.0 ug/mL 13.4  17.3 (H)   MRI PREVIOUS Unknown  Rpt    Lacosamide Latest Ref Range: 1.0 - 10.0 mcg/mL 8.0           pt has had the following episodes:  2015/4/8 - Pt had just finished a walk.  Eye blinking and stereotypic hand movements.  No generalized convulsions were noted. Didn't lose consciousness.  Lasted 30s to 1 minute.  2015/6/6 - Pt was in his room.  Came to his dad to give him his phone to record what was happening.  Said he has the feeling that he's about to have a seizure.  Says there is no aura per se.  Says a thought that he's about to have a seizure pops into his head, which usually precedes his events.  Said his speech got markedly worse during the event.  Eye blinking lasted 30s to 1 minute and resolved spontaneously.  Also had stereotypic hand movements and eye blinking.  Maintained consciousness throughout episode.  Pt had no memory of the seizure, which was relayed by his dad.    2015/6/8 - Pt was running around a track.  Said he overexerted himself over the last three laps.  Said he had another event immediately after exercise.  Again, this episode was brief (30s-1min) and was characterized by walking in circles, eye blinking, and  stereotypic hand motions of rubbing his fingers. Had no intraictal recall.      Previous History (2015/4/1)   The patient was referred for consultation by Dr. Guerrero due to want to transfer care here due to long distance, patient is living [facility].  The patient was accompanied by his mother and father who provided some of the history.      Seizure surgery 2008 for epilepsy. Since then, less frequent and not as strong, still have seizure twice a month with current medication dosage. He is compliant with medication, tolerates medication well, no side effect noticed. On current regime for about 5 years, change to onfi 2-3 years ago in place of keppra.       Epilepsy History  ED visits: last year, feel someone talking to him  Episodes of SE  Change in meds    Seizure Seminology  Seizure Type 1  Classification:   Aura - feel it is coming, his mind told him that seizure is coming  Ictus  - Nonconv -  - Conv - whole body jerking more on the right side, after a couple of jerk, he is out, first started has tongue biting and incontinence  - Duration - 2 minutes  Post-ictal  - Symptoms: can not talk, sleepiness no headache  - Duration: 5-10 minutes  Age of onset : 6 year old  Current Seizure Frequency -  Initially once week,  Last Seizure: 10 years ago    Seizure Type 2: after seizure  Classification:   Aura - feel it is coming, but not as strong as before  Ictus  - Nonconv -  - Conv - blinking and body jerk more on the arm.   - Duration - 30 sec  Post-ictal  - Symptoms: no sleepiness  - Duration: back to baseline after ictus right away  Age of onset : 29  Current Seizure Frequency -  Last Seizure: a couple weeks ago    sz per month 2012 2013 2014 2015 2016 2017   Doug         Feb         Mar         Apr    1     May         Deny    2     Jul     1    Aug         Sep     1    Oct         Nov         Dec         Tot           Seizure Triggers:   Sleep Deprivation - None  Other medicaitons - None  Psych/stress - excitement can  trigger jorge athletic event, when feel frustrated  Photic stimulation - None  Hyperventilation - None  Medical Problems - None  Sensory Stimulation (light, sound, etc) - None  Missed dose of meds - Denies missing meds since last visit though has missed meds in the past which were closely followed by seizures.   Computer use may trigger- sometimes  Exercise/overexertion  may trigger- sometimes    AED Treatments  Present regimen  clobezam (Onfi or Frizium, CLB):  40 mg bid  lacosamide (Vimpat, LCS) :  200 mg bid  lamotrigine (Lamictal, LTG)   600 mg bid  Perampanel: 6 mg qd    Results for GEORGETTE MONCADA (MRN [id number]) as of 12/16/2015 14:24   Ref. Range 4/1/2015 13:20 8/5/2015 14:05 8/21/2015 11:40   Lamotrigine Lvl Latest Range: 2.0-15.0 ug/mL 13.4  17.3 (H)     Ativan PRN    Prior treatments  carbamazepine (Tegretol, CBZ):  tried in the past  ethosuximide (Zarontin, ESM):  tried in the past  felbamate (felbatol, FBM):   tried in the past  gabapentin (Neurontin, GPN):  tried in the past  levetiracetam (Keppra, LEV):   tried in the past  phenytoin (Dilantin, PHT):   tried in the past  primidone (Mysoline, PRM):   tried in the past  topiramate (Topamax, TPM):   tried in the past  valproic acid (Depakote, VPA):  tried in the past   clorazepate (Tranxene, CLZ):   Stopped previous visits.     Not tried  acetazolamide (Diamox, AZM)  amantadine  eslicarbazine (Aptiom, ESL)  methsuximide (Celontin, MSM)  methyphenytoin (Mesantion, MHT)  oxcarbazepine (Trileptal OXC)  perampanel (Fycompa, FCP)   phenobarbital (Pb)  pregabalin (Lyrica, PGB)  retigabine (Potiga, RTG)  rufinamide (Banzel, RUF)  tiagabine (Gabatril,  TGB)  viagabatrin, (Sabril, VGB)  vagal nerve stimulator (VNS)  zonisamide (Zonegran, ZNA)  Benzodiazepines  diazepam - rectal (Diastatl)  diazepam - oral (Valium, DZ)  clonazepam (Klonopin, CZP)  Brain Stimulation  Vagal Nerve Stimulation-n/a  DBS- n/a    Compliance method  Memory - yes  Mom or Spouse - Yes  Pill  Box - no  Henrique calendar - no  Turn over medication bottle - no  Phone alarm - no    Seizure Evaluation  EEG Routine -   EEG Ambulatory -   EEG\Video Monitoring - 2014  MRI/MRA - 2015  CT/CTA Scan -   PET Scan -   Neuropsychological evaluation -   DEXA Scan  Had continuous EEG in 2014 no find anything abnormal.  MRI brain 2015: no new changes.     Potential Epilepsy Risk Factors:   Pregnancy/Labor/Delivery - full term uncomplicated pregnancy labor and vaginal delivery  Febrile seizures - none  Head injury  - none  CNS infection - none     Stroke - none  Family Hx of Sz - none    PAST MEDICAL HISTORY:   Epilepsy     PAST SURGICAL HISTORY: epilepsy surgery 2008    FAMILY HISTORY:   Family History   Problem Relation Age of Onset    Diabetes Father     Cancer Father     Diabetes Sister     Cancer Paternal Aunt     Diabetes Paternal Uncle     Cancer Paternal Grandmother     Diabetes Paternal Grandfather     Cancer Paternal Grandfather            SOCIAL HISTORY:   Social History     Socioeconomic History    Marital status: Single     Spouse name: Not on file    Number of children: Not on file    Years of education: Not on file    Highest education level: Not on file   Occupational History    Not on file   Social Needs    Financial resource strain: Not on file    Food insecurity     Worry: Not on file     Inability: Not on file    Transportation needs     Medical: Not on file     Non-medical: Not on file   Tobacco Use    Smoking status: Never Smoker   Substance and Sexual Activity    Alcohol use: No    Drug use: No    Sexual activity: Not on file   Lifestyle    Physical activity     Days per week: Not on file     Minutes per session: Not on file    Stress: Not on file   Relationships    Social connections     Talks on phone: Not on file     Gets together: Not on file     Attends Nondenominational service: Not on file     Active member of club or organization: Not on file     Attends meetings of clubs or  "organizations: Not on file     Relationship status: Not on file   Other Topics Concern    Not on file   Social History Narrative    Not on file        SUBSTANCE USE:  Social History     Tobacco Use    Smoking status: Never Smoker   Substance and Sexual Activity    Alcohol use: No    Drug use: No    Sexual activity: Not on file      Social History     Tobacco Use    Smoking status: Never Smoker   Substance Use Topics    Alcohol use: No        ALLERGIES: Patient has no known allergies.     /78 (BP Location: Right arm, Patient Position: Sitting, BP Method: Large (Automatic))   Pulse 79   Ht 5' 11" (1.803 m)   Wt 87 kg (191 lb 12.8 oz)   BMI 26.75 kg/m²     Higher Cortical Function:    Patient is a well developed, pleasant, well groomed individual appearing their stated age  Oriented - intact to person, place and time and followed two step instruction correctly.    Fund of knowledge was appropriate.    R-L Orientation - Intact   Language - Speech showed impaired verbal fluency and expression.  Some perseveration of speech.  Agnosias, agraphesthesia, or astereognosis - not present.   Extinction with double simultaneous stimulation:        Proximal-distal stimulation - Not present        Right-left stimulation - Not present  Cranial Nerves II - XII:    EOMs were intact with normal smooth and a mild end gaze nystagmus.    PERRLA. D/C     Motor - facial movement was symmetrical and normal.    Facial sensory - Light touch and pin prick sensations were normal.    Hearing was normal to finger rub.  Palate moved well and was symmetrical with normal palatal and oral sensation.    Tongue movement was full & the patient could say "la la la" and "Ka Ka Ka" without  difficulty. Patient repeated Buddhist and Pentecostalism without difficulty. Normal power and bulk was found in the massiter and rotator muscles of the neck.  Motor: Power, bulk and tone were normal in all extremities.  Sensory: Light touch, pin prick, " vibration and position senses were normal in all extremities.    Coordination:       Rapid alternating movements and rapid finger tapping - normal.       Finger to nose - nl.       Arm roll - symmetrical.    Gait:  Station, gait and tandem walking were done without difficulty and Romberg was negative.    Tremor: resting, postural, intentional - none    Pulses    Carotids - strong without bruits    Peripheral - strong and symmetrical      Model # [id number] M106  Implant Date: 2/21/2019  Is Device palpable in chest wall  Yes  Side of implant    L          Initial  Reprogram   Output current  mA 0.675  0.675  Signal Freq          Hz 30  30  Pulse width        uSec 750  1000    Signal on time     Sec 30  30  Signal off time      Min 10.0  10.0    Autostimulation (AS)  Output Current          mA 0.75  0.75  Pulse width        uSec 750  1000  Signal on time         Sec 60    Magnet settings  Output current          mA 0.75  0.75  Pulse width        uSec 750  1000  Signal on time         Sec 60  60    Tachycardia detect  On/Off  On/off  Threshhold for AS %         IMPRESSION  1. Complex partial  2. GTC  3. Post Left temporal lobe resection  4. S/p VNS implantation  5. pharmacodynamic interaction of lamotrigine with lacosamide.    DISPOSITION:   1. Continue clobazam 3½ tabs a day  2. Increase lacosamide (Vimpat, LCS) : 200 mg from 1 am, 1.5 pm to 1.5 BID  3. Continue lamotrigine (Lamictal, LTG) 200 mg tabs 3 tab BID   4.  Continue Perampanel 2 mg 3 tabs QD   5. VNS interrogated and functioning well   6.  RTC in 3 months, or sooner as needed

## 2020-08-20 NOTE — TELEPHONE ENCOUNTER
----- Message from Yair Hernandez sent at 8/20/2020 10:08 AM CDT -----  Contact: Pt @ [phone]  Patient requesting a return call from Julianna regarding insurance, pls call

## 2020-08-20 NOTE — TELEPHONE ENCOUNTER
Spoke to pts dad, the insurance is denying the labs that were ordered saying they were not medically necessary. Asked for the number so that I can further investigate and get a solution. Sent in 90 day request for Fycompa to [facility]. Awaiting doctors approval

## 2020-08-21 ENCOUNTER — TELEPHONE (OUTPATIENT)
Dept: NEUROLOGY | Facility: CLINIC | Age: 45
End: 2020-08-21

## 2020-08-21 NOTE — TELEPHONE ENCOUNTER
Spoke to Leandra regarding pts labs being denied by insurance. Leandra stated that an appeal has already been filed and denied and that the patient must file is own appeal. Left message for dad with this information

## 2020-11-11 ENCOUNTER — TELEPHONE (OUTPATIENT)
Dept: NEUROLOGY | Facility: CLINIC | Age: 45
End: 2020-11-11

## 2020-11-11 NOTE — TELEPHONE ENCOUNTER
----- Message from Adri Keating sent at 11/11/2020 10:56 AM CST -----  Regarding: General Inquiry regarding VNS Device  Contact: Nicholas Hirsch has questions regarding the VNS device and dental X-Rays.  He would like to speak to someone due to him having dental appt coming up. He can be reached at [phone]

## 2020-11-11 NOTE — TELEPHONE ENCOUNTER
Called patient to inform him that he can have dental xrays with his vns. Patient verbalized understanding.

## 2021-01-13 ENCOUNTER — TELEPHONE (OUTPATIENT)
Dept: NEUROLOGY | Facility: CLINIC | Age: 46
End: 2021-01-13

## 2021-01-15 ENCOUNTER — PATIENT MESSAGE (OUTPATIENT)
Dept: NEUROLOGY | Facility: CLINIC | Age: 46
End: 2021-01-15

## 2021-01-21 DIAGNOSIS — G40.219 LOCALIZATION-RELATED SYMPTOMATIC EPILEPSY AND EPILEPTIC SYNDROMES WITH COMPLEX PARTIAL SEIZURES, INTRACTABLE, WITHOUT STATUS EPILEPTICUS: ICD-10-CM

## 2021-01-22 RX ORDER — LAMOTRIGINE 200 MG/1
600 TABLET ORAL 2 TIMES DAILY
Qty: 540 TABLET | Refills: 2 | Status: SHIPPED | OUTPATIENT
Start: 2021-01-22 | End: 2021-08-09 | Stop reason: SDUPTHER

## 2021-01-22 RX ORDER — LACOSAMIDE 200 MG/1
300 TABLET, FILM COATED ORAL EVERY 12 HOURS
Qty: 270 TABLET | Refills: 2 | Status: SHIPPED | OUTPATIENT
Start: 2021-01-22 | End: 2021-08-09 | Stop reason: SDUPTHER

## 2021-01-22 RX ORDER — CLOBAZAM 20 MG/1
TABLET ORAL
Qty: 360 TABLET | Refills: 2 | Status: SHIPPED | OUTPATIENT
Start: 2021-01-22 | End: 2021-08-09 | Stop reason: SDUPTHER

## 2021-01-25 ENCOUNTER — TELEPHONE (OUTPATIENT)
Dept: NEUROLOGY | Facility: CLINIC | Age: 46
End: 2021-01-25

## 2021-01-27 ENCOUNTER — TELEPHONE (OUTPATIENT)
Dept: NEUROLOGY | Facility: CLINIC | Age: 46
End: 2021-01-27

## 2021-01-28 ENCOUNTER — TELEPHONE (OUTPATIENT)
Dept: NEUROLOGY | Facility: CLINIC | Age: 46
End: 2021-01-28

## 2021-03-11 ENCOUNTER — OFFICE VISIT (OUTPATIENT)
Dept: NEUROLOGY | Facility: CLINIC | Age: 46
End: 2021-03-11
Payer: COMMERCIAL

## 2021-03-11 DIAGNOSIS — Z96.89 S/P PLACEMENT OF VNS (VAGUS NERVE STIMULATION) DEVICE: ICD-10-CM

## 2021-03-11 DIAGNOSIS — G40.219 LOCALIZATION-RELATED SYMPTOMATIC EPILEPSY AND EPILEPTIC SYNDROMES WITH COMPLEX PARTIAL SEIZURES, INTRACTABLE, WITHOUT STATUS EPILEPTICUS: Primary | ICD-10-CM

## 2021-03-11 PROCEDURE — 99999 PR PBB SHADOW E&M-EST. PATIENT-LVL I: ICD-10-PCS | Mod: PBBFAC,,, | Performed by: PSYCHIATRY & NEUROLOGY

## 2021-03-11 PROCEDURE — 99214 OFFICE O/P EST MOD 30 MIN: CPT | Mod: S$GLB,,, | Performed by: PSYCHIATRY & NEUROLOGY

## 2021-03-11 PROCEDURE — 99999 PR PBB SHADOW E&M-EST. PATIENT-LVL I: CPT | Mod: PBBFAC,,, | Performed by: PSYCHIATRY & NEUROLOGY

## 2021-03-11 PROCEDURE — 99214 PR OFFICE/OUTPT VISIT, EST, LEVL IV, 30-39 MIN: ICD-10-PCS | Mod: S$GLB,,, | Performed by: PSYCHIATRY & NEUROLOGY

## 2021-03-12 ENCOUNTER — TELEPHONE (OUTPATIENT)
Dept: NEUROLOGY | Facility: CLINIC | Age: 46
End: 2021-03-12

## 2021-03-23 ENCOUNTER — PATIENT MESSAGE (OUTPATIENT)
Dept: NEUROLOGY | Facility: CLINIC | Age: 46
End: 2021-03-23

## 2021-05-05 ENCOUNTER — TELEPHONE (OUTPATIENT)
Dept: NEUROLOGY | Facility: CLINIC | Age: 46
End: 2021-05-05

## 2021-05-10 ENCOUNTER — TELEPHONE (OUTPATIENT)
Dept: NEUROLOGY | Facility: CLINIC | Age: 46
End: 2021-05-10

## 2021-05-13 ENCOUNTER — TELEPHONE (OUTPATIENT)
Dept: NEUROLOGY | Facility: CLINIC | Age: 46
End: 2021-05-13

## 2021-05-13 DIAGNOSIS — G40.219 LOCALIZATION-RELATED SYMPTOMATIC EPILEPSY AND EPILEPTIC SYNDROMES WITH COMPLEX PARTIAL SEIZURES, INTRACTABLE, WITHOUT STATUS EPILEPTICUS: Primary | ICD-10-CM

## 2021-05-18 ENCOUNTER — TELEPHONE (OUTPATIENT)
Dept: NEUROLOGY | Facility: CLINIC | Age: 46
End: 2021-05-18

## 2021-05-26 ENCOUNTER — TELEPHONE (OUTPATIENT)
Dept: NEUROLOGY | Facility: CLINIC | Age: 46
End: 2021-05-26

## 2021-05-28 RX ORDER — LORAZEPAM 1 MG/1
1 TABLET ORAL
Qty: 5 TABLET | Refills: 0 | Status: SHIPPED | OUTPATIENT
Start: 2021-05-28 | End: 2022-04-07 | Stop reason: SDUPTHER

## 2021-05-31 ENCOUNTER — OFFICE VISIT (OUTPATIENT)
Dept: NEUROLOGY | Facility: CLINIC | Age: 46
End: 2021-05-31
Payer: COMMERCIAL

## 2021-05-31 ENCOUNTER — SOCIAL WORK (OUTPATIENT)
Dept: NEUROLOGY | Facility: CLINIC | Age: 46
End: 2021-05-31

## 2021-05-31 VITALS
BODY MASS INDEX: 28.77 KG/M2 | HEIGHT: 71 IN | WEIGHT: 205.5 LBS | HEART RATE: 85 BPM | DIASTOLIC BLOOD PRESSURE: 79 MMHG | SYSTOLIC BLOOD PRESSURE: 125 MMHG

## 2021-05-31 DIAGNOSIS — Z96.89 S/P PLACEMENT OF VNS (VAGUS NERVE STIMULATION) DEVICE: ICD-10-CM

## 2021-05-31 DIAGNOSIS — F07.0 TEMPORAL LOBECTOMY BEHAVIOR SYNDROME: ICD-10-CM

## 2021-05-31 DIAGNOSIS — Z60.4 ISOLATION, SOCIAL: ICD-10-CM

## 2021-05-31 DIAGNOSIS — G40.219 LOCALIZATION-RELATED SYMPTOMATIC EPILEPSY AND EPILEPTIC SYNDROMES WITH COMPLEX PARTIAL SEIZURES, INTRACTABLE, WITHOUT STATUS EPILEPTICUS: Primary | ICD-10-CM

## 2021-05-31 PROCEDURE — 3008F PR BODY MASS INDEX (BMI) DOCUMENTED: ICD-10-PCS | Mod: S$GLB,,, | Performed by: PSYCHIATRY & NEUROLOGY

## 2021-05-31 PROCEDURE — 99999 PR PBB SHADOW E&M-EST. PATIENT-LVL III: CPT | Mod: PBBFAC,,, | Performed by: PSYCHIATRY & NEUROLOGY

## 2021-05-31 PROCEDURE — 99214 OFFICE O/P EST MOD 30 MIN: CPT | Mod: S$GLB,,, | Performed by: PSYCHIATRY & NEUROLOGY

## 2021-05-31 PROCEDURE — 3008F BODY MASS INDEX DOCD: CPT | Mod: S$GLB,,, | Performed by: PSYCHIATRY & NEUROLOGY

## 2021-05-31 PROCEDURE — 1126F PR PAIN SEVERITY QUANTIFIED, NO PAIN PRESENT: ICD-10-PCS | Mod: S$GLB,,, | Performed by: PSYCHIATRY & NEUROLOGY

## 2021-05-31 PROCEDURE — 99999 PR PBB SHADOW E&M-EST. PATIENT-LVL III: ICD-10-PCS | Mod: PBBFAC,,, | Performed by: PSYCHIATRY & NEUROLOGY

## 2021-05-31 PROCEDURE — 99214 PR OFFICE/OUTPT VISIT, EST, LEVL IV, 30-39 MIN: ICD-10-PCS | Mod: S$GLB,,, | Performed by: PSYCHIATRY & NEUROLOGY

## 2021-05-31 PROCEDURE — 1126F AMNT PAIN NOTED NONE PRSNT: CPT | Mod: S$GLB,,, | Performed by: PSYCHIATRY & NEUROLOGY

## 2021-05-31 SDOH — SOCIAL DETERMINANTS OF HEALTH (SDOH): SOCIAL EXCLUSION AND REJECTION: Z60.4

## 2021-06-02 ENCOUNTER — PATIENT MESSAGE (OUTPATIENT)
Dept: NEUROLOGY | Facility: CLINIC | Age: 46
End: 2021-06-02

## 2021-06-03 ENCOUNTER — SOCIAL WORK (OUTPATIENT)
Dept: NEUROLOGY | Facility: CLINIC | Age: 46
End: 2021-06-03

## 2021-06-03 ENCOUNTER — TELEPHONE (OUTPATIENT)
Dept: NEUROLOGY | Facility: CLINIC | Age: 46
End: 2021-06-03

## 2021-06-05 PROBLEM — Z60.4: Status: ACTIVE | Noted: 2021-06-05

## 2021-06-15 ENCOUNTER — TELEPHONE (OUTPATIENT)
Dept: NEUROLOGY | Facility: CLINIC | Age: 46
End: 2021-06-15

## 2021-06-25 ENCOUNTER — SOCIAL WORK (OUTPATIENT)
Dept: NEUROLOGY | Facility: CLINIC | Age: 46
End: 2021-06-25

## 2021-07-09 ENCOUNTER — TELEPHONE (OUTPATIENT)
Dept: NEUROLOGY | Facility: CLINIC | Age: 46
End: 2021-07-09

## 2021-07-15 ENCOUNTER — TELEPHONE (OUTPATIENT)
Dept: NEUROLOGY | Facility: CLINIC | Age: 46
End: 2021-07-15

## 2021-07-16 ENCOUNTER — TELEPHONE (OUTPATIENT)
Dept: NEUROLOGY | Facility: CLINIC | Age: 46
End: 2021-07-16

## 2021-07-16 ENCOUNTER — PATIENT MESSAGE (OUTPATIENT)
Dept: NEUROLOGY | Facility: CLINIC | Age: 46
End: 2021-07-16

## 2021-08-09 DIAGNOSIS — G40.219 LOCALIZATION-RELATED SYMPTOMATIC EPILEPSY AND EPILEPTIC SYNDROMES WITH COMPLEX PARTIAL SEIZURES, INTRACTABLE, WITHOUT STATUS EPILEPTICUS: ICD-10-CM

## 2021-08-09 NOTE — TELEPHONE ENCOUNTER
----- Message from oZe yLnne sent at 8/9/2021  8:28 AM CDT -----  Contact: Nicholas @ [phone]  Pt needs a call back regarding side effects after taking his meds in the morning. He states he feels extra drowsy about an hour after taking it. He also wanted to know if he can separate the dose throughout the day rather than taking it all at once.

## 2021-08-09 NOTE — TELEPHONE ENCOUNTER
----- Message from Zoe Lynne sent at 8/9/2021  8:23 AM CDT -----  Contact: Nicholas @[phone]  Pt needs a refill on his medication lamoTRIgine (LAMICTAL) 200 MG, lacosamide (VIMPAT) 200 mg Tab tablet, cloBAZam (ONFI) 20 mg Tab, and perampaneL (FYCOMPA) 2 mg     Pt uses [facility]# [phone]

## 2021-08-10 RX ORDER — LACOSAMIDE 200 MG/1
300 TABLET ORAL EVERY 12 HOURS
Qty: 270 TABLET | Refills: 0 | Status: SHIPPED | OUTPATIENT
Start: 2021-08-10 | End: 2021-08-11 | Stop reason: SDUPTHER

## 2021-08-10 RX ORDER — LAMOTRIGINE 200 MG/1
600 TABLET ORAL 2 TIMES DAILY
Qty: 540 TABLET | Refills: 0 | Status: SHIPPED | OUTPATIENT
Start: 2021-08-10 | End: 2021-08-11 | Stop reason: SDUPTHER

## 2021-08-10 RX ORDER — CLOBAZAM 20 MG/1
TABLET ORAL
Qty: 225 TABLET | Refills: 0 | Status: SHIPPED | OUTPATIENT
Start: 2021-08-10 | End: 2021-08-11 | Stop reason: SDUPTHER

## 2021-08-11 DIAGNOSIS — G40.219 LOCALIZATION-RELATED SYMPTOMATIC EPILEPSY AND EPILEPTIC SYNDROMES WITH COMPLEX PARTIAL SEIZURES, INTRACTABLE, WITHOUT STATUS EPILEPTICUS: ICD-10-CM

## 2021-08-11 RX ORDER — LAMOTRIGINE 200 MG/1
600 TABLET ORAL 2 TIMES DAILY
Qty: 540 TABLET | Refills: 4 | Status: SHIPPED | OUTPATIENT
Start: 2021-08-11 | End: 2022-01-21 | Stop reason: SDUPTHER

## 2021-08-11 RX ORDER — CLOBAZAM 20 MG/1
TABLET ORAL
Qty: 225 TABLET | Refills: 4 | Status: SHIPPED | OUTPATIENT
Start: 2021-08-11 | End: 2022-01-21 | Stop reason: SDUPTHER

## 2021-08-11 RX ORDER — LACOSAMIDE 200 MG/1
300 TABLET ORAL EVERY 12 HOURS
Qty: 270 TABLET | Refills: 4 | Status: SHIPPED | OUTPATIENT
Start: 2021-08-11 | End: 2022-01-21 | Stop reason: SDUPTHER

## 2021-08-11 NOTE — TELEPHONE ENCOUNTER
----- Message from Leeanna Toth sent at 8/11/2021 10:09 AM CDT -----  Regarding: pt  Rx Refill/Request     Is this a Refill or New Rx:  Refill   Rx Name and Strength:  lacosamide (VIMPAT) 200 mg Tab tabletcloBAZam (ONFI) 20 mg Tab perampaneL (FYCOMPA) 2 mg tablet  Preferred Pharmacy with phone number: pt said these [facility]  [phone]  Communication Preference: can you please call pt at [phone]  Additional Information:  none    ADRYAN

## 2021-09-15 ENCOUNTER — PATIENT OUTREACH (OUTPATIENT)
Dept: NEUROLOGY | Facility: CLINIC | Age: 46
End: 2021-09-15

## 2021-09-30 ENCOUNTER — OFFICE VISIT (OUTPATIENT)
Dept: NEUROLOGY | Facility: CLINIC | Age: 46
End: 2021-09-30
Payer: COMMERCIAL

## 2021-09-30 VITALS
HEART RATE: 77 BPM | WEIGHT: 204.81 LBS | SYSTOLIC BLOOD PRESSURE: 122 MMHG | DIASTOLIC BLOOD PRESSURE: 77 MMHG | BODY MASS INDEX: 28.56 KG/M2

## 2021-09-30 DIAGNOSIS — G40.219 LOCALIZATION-RELATED SYMPTOMATIC EPILEPSY AND EPILEPTIC SYNDROMES WITH COMPLEX PARTIAL SEIZURES, INTRACTABLE, WITHOUT STATUS EPILEPTICUS: Primary | ICD-10-CM

## 2021-09-30 DIAGNOSIS — Z96.89 S/P PLACEMENT OF VNS (VAGUS NERVE STIMULATION) DEVICE: ICD-10-CM

## 2021-09-30 PROCEDURE — 1159F PR MEDICATION LIST DOCUMENTED IN MEDICAL RECORD: ICD-10-PCS | Mod: S$GLB,,, | Performed by: PSYCHIATRY & NEUROLOGY

## 2021-09-30 PROCEDURE — 99215 PR OFFICE/OUTPT VISIT, EST, LEVL V, 40-54 MIN: ICD-10-PCS | Mod: 25,S$GLB,, | Performed by: PSYCHIATRY & NEUROLOGY

## 2021-09-30 PROCEDURE — 3074F SYST BP LT 130 MM HG: CPT | Mod: S$GLB,,, | Performed by: PSYCHIATRY & NEUROLOGY

## 2021-09-30 PROCEDURE — 1160F PR REVIEW ALL MEDS BY PRESCRIBER/CLIN PHARMACIST DOCUMENTED: ICD-10-PCS | Mod: S$GLB,,, | Performed by: PSYCHIATRY & NEUROLOGY

## 2021-09-30 PROCEDURE — 3008F BODY MASS INDEX DOCD: CPT | Mod: S$GLB,,, | Performed by: PSYCHIATRY & NEUROLOGY

## 2021-09-30 PROCEDURE — 99215 OFFICE O/P EST HI 40 MIN: CPT | Mod: 25,S$GLB,, | Performed by: PSYCHIATRY & NEUROLOGY

## 2021-09-30 PROCEDURE — 95970 ALYS NPGT W/O PRGRMG: CPT | Mod: S$GLB,,, | Performed by: PSYCHIATRY & NEUROLOGY

## 2021-09-30 PROCEDURE — 1160F RVW MEDS BY RX/DR IN RCRD: CPT | Mod: S$GLB,,, | Performed by: PSYCHIATRY & NEUROLOGY

## 2021-09-30 PROCEDURE — 1159F MED LIST DOCD IN RCRD: CPT | Mod: S$GLB,,, | Performed by: PSYCHIATRY & NEUROLOGY

## 2021-09-30 PROCEDURE — 3078F DIAST BP <80 MM HG: CPT | Mod: S$GLB,,, | Performed by: PSYCHIATRY & NEUROLOGY

## 2021-09-30 PROCEDURE — 3074F PR MOST RECENT SYSTOLIC BLOOD PRESSURE < 130 MM HG: ICD-10-PCS | Mod: S$GLB,,, | Performed by: PSYCHIATRY & NEUROLOGY

## 2021-09-30 PROCEDURE — 99999 PR PBB SHADOW E&M-EST. PATIENT-LVL III: ICD-10-PCS | Mod: PBBFAC,,, | Performed by: PSYCHIATRY & NEUROLOGY

## 2021-09-30 PROCEDURE — 3078F PR MOST RECENT DIASTOLIC BLOOD PRESSURE < 80 MM HG: ICD-10-PCS | Mod: S$GLB,,, | Performed by: PSYCHIATRY & NEUROLOGY

## 2021-09-30 PROCEDURE — 95970 PR ANALYZE NEUROSTIM,NO REPROG: ICD-10-PCS | Mod: S$GLB,,, | Performed by: PSYCHIATRY & NEUROLOGY

## 2021-09-30 PROCEDURE — 3008F PR BODY MASS INDEX (BMI) DOCUMENTED: ICD-10-PCS | Mod: S$GLB,,, | Performed by: PSYCHIATRY & NEUROLOGY

## 2021-09-30 PROCEDURE — 99999 PR PBB SHADOW E&M-EST. PATIENT-LVL III: CPT | Mod: PBBFAC,,, | Performed by: PSYCHIATRY & NEUROLOGY

## 2021-10-15 ENCOUNTER — TELEPHONE (OUTPATIENT)
Dept: NEUROLOGY | Facility: CLINIC | Age: 46
End: 2021-10-15

## 2021-11-17 ENCOUNTER — OFFICE VISIT (OUTPATIENT)
Dept: NEUROLOGY | Facility: CLINIC | Age: 46
End: 2021-11-17
Payer: COMMERCIAL

## 2021-11-17 VITALS
BODY MASS INDEX: 28 KG/M2 | SYSTOLIC BLOOD PRESSURE: 141 MMHG | DIASTOLIC BLOOD PRESSURE: 95 MMHG | HEART RATE: 101 BPM | HEIGHT: 71 IN | WEIGHT: 200 LBS

## 2021-11-17 DIAGNOSIS — R41.3 MEMORY CHANGES: Primary | ICD-10-CM

## 2021-11-17 DIAGNOSIS — G40.219 LOCALIZATION-RELATED SYMPTOMATIC EPILEPSY AND EPILEPTIC SYNDROMES WITH COMPLEX PARTIAL SEIZURES, INTRACTABLE, WITHOUT STATUS EPILEPTICUS: ICD-10-CM

## 2021-11-17 PROCEDURE — 3008F BODY MASS INDEX DOCD: CPT | Mod: S$GLB,,, | Performed by: PSYCHIATRY & NEUROLOGY

## 2021-11-17 PROCEDURE — 99215 PR OFFICE/OUTPT VISIT, EST, LEVL V, 40-54 MIN: ICD-10-PCS | Mod: S$GLB,,, | Performed by: PSYCHIATRY & NEUROLOGY

## 2021-11-17 PROCEDURE — 3080F DIAST BP >= 90 MM HG: CPT | Mod: S$GLB,,, | Performed by: PSYCHIATRY & NEUROLOGY

## 2021-11-17 PROCEDURE — 3080F PR MOST RECENT DIASTOLIC BLOOD PRESSURE >= 90 MM HG: ICD-10-PCS | Mod: S$GLB,,, | Performed by: PSYCHIATRY & NEUROLOGY

## 2021-11-17 PROCEDURE — 99999 PR PBB SHADOW E&M-EST. PATIENT-LVL III: CPT | Mod: PBBFAC,,, | Performed by: PSYCHIATRY & NEUROLOGY

## 2021-11-17 PROCEDURE — 1160F RVW MEDS BY RX/DR IN RCRD: CPT | Mod: S$GLB,,, | Performed by: PSYCHIATRY & NEUROLOGY

## 2021-11-17 PROCEDURE — 3077F SYST BP >= 140 MM HG: CPT | Mod: S$GLB,,, | Performed by: PSYCHIATRY & NEUROLOGY

## 2021-11-17 PROCEDURE — 1159F MED LIST DOCD IN RCRD: CPT | Mod: S$GLB,,, | Performed by: PSYCHIATRY & NEUROLOGY

## 2021-11-17 PROCEDURE — 1159F PR MEDICATION LIST DOCUMENTED IN MEDICAL RECORD: ICD-10-PCS | Mod: S$GLB,,, | Performed by: PSYCHIATRY & NEUROLOGY

## 2021-11-17 PROCEDURE — 99999 PR PBB SHADOW E&M-EST. PATIENT-LVL III: ICD-10-PCS | Mod: PBBFAC,,, | Performed by: PSYCHIATRY & NEUROLOGY

## 2021-11-17 PROCEDURE — 3008F PR BODY MASS INDEX (BMI) DOCUMENTED: ICD-10-PCS | Mod: S$GLB,,, | Performed by: PSYCHIATRY & NEUROLOGY

## 2021-11-17 PROCEDURE — 3077F PR MOST RECENT SYSTOLIC BLOOD PRESSURE >= 140 MM HG: ICD-10-PCS | Mod: S$GLB,,, | Performed by: PSYCHIATRY & NEUROLOGY

## 2021-11-17 PROCEDURE — 1160F PR REVIEW ALL MEDS BY PRESCRIBER/CLIN PHARMACIST DOCUMENTED: ICD-10-PCS | Mod: S$GLB,,, | Performed by: PSYCHIATRY & NEUROLOGY

## 2021-11-17 PROCEDURE — 99215 OFFICE O/P EST HI 40 MIN: CPT | Mod: S$GLB,,, | Performed by: PSYCHIATRY & NEUROLOGY

## 2021-12-30 ENCOUNTER — TELEPHONE (OUTPATIENT)
Dept: NEUROLOGY | Facility: CLINIC | Age: 46
End: 2021-12-30
Payer: COMMERCIAL

## 2022-01-03 ENCOUNTER — TELEPHONE (OUTPATIENT)
Dept: NEUROLOGY | Facility: CLINIC | Age: 47
End: 2022-01-03
Payer: COMMERCIAL

## 2022-01-05 ENCOUNTER — TELEPHONE (OUTPATIENT)
Dept: NEUROLOGY | Facility: CLINIC | Age: 47
End: 2022-01-05
Payer: COMMERCIAL

## 2022-01-05 NOTE — TELEPHONE ENCOUNTER
Spoke to patient's dad. Patient still experiencing dizziness and after decreases Onfi he has experienced some smaller breakthrough episodes. Message given to Dr Roberts

## 2022-01-06 ENCOUNTER — SOCIAL WORK (OUTPATIENT)
Dept: NEUROLOGY | Facility: CLINIC | Age: 47
End: 2022-01-06

## 2022-01-06 ENCOUNTER — OFFICE VISIT (OUTPATIENT)
Dept: NEUROLOGY | Facility: CLINIC | Age: 47
End: 2022-01-06
Payer: COMMERCIAL

## 2022-01-06 ENCOUNTER — HOSPITAL ENCOUNTER (OUTPATIENT)
Dept: NEUROLOGY | Facility: CLINIC | Age: 47
Discharge: HOME | End: 2022-01-06
Payer: COMMERCIAL

## 2022-01-06 VITALS
HEIGHT: 71 IN | SYSTOLIC BLOOD PRESSURE: 125 MMHG | BODY MASS INDEX: 27.89 KG/M2 | DIASTOLIC BLOOD PRESSURE: 76 MMHG | HEART RATE: 90 BPM

## 2022-01-06 DIAGNOSIS — Z96.89 S/P PLACEMENT OF VNS (VAGUS NERVE STIMULATION) DEVICE: ICD-10-CM

## 2022-01-06 DIAGNOSIS — G40.219 LOCALIZATION-RELATED SYMPTOMATIC EPILEPSY AND EPILEPTIC SYNDROMES WITH COMPLEX PARTIAL SEIZURES, INTRACTABLE, WITHOUT STATUS EPILEPTICUS: ICD-10-CM

## 2022-01-06 DIAGNOSIS — G40.219 LOCALIZATION-RELATED SYMPTOMATIC EPILEPSY AND EPILEPTIC SYNDROMES WITH COMPLEX PARTIAL SEIZURES, INTRACTABLE, WITHOUT STATUS EPILEPTICUS: Primary | ICD-10-CM

## 2022-01-06 PROCEDURE — 99999 PR PBB SHADOW E&M-EST. PATIENT-LVL III: CPT | Mod: PBBFAC,,, | Performed by: PSYCHIATRY & NEUROLOGY

## 2022-01-06 PROCEDURE — 3074F SYST BP LT 130 MM HG: CPT | Mod: CPTII,S$GLB,, | Performed by: PSYCHIATRY & NEUROLOGY

## 2022-01-06 PROCEDURE — 95816 EEG AWAKE AND DROWSY: CPT | Mod: S$GLB,,, | Performed by: PSYCHIATRY & NEUROLOGY

## 2022-01-06 PROCEDURE — 95816 PR EEG,W/AWAKE & DROWSY RECORD: ICD-10-PCS | Mod: S$GLB,,, | Performed by: PSYCHIATRY & NEUROLOGY

## 2022-01-06 PROCEDURE — 99214 OFFICE O/P EST MOD 30 MIN: CPT | Mod: S$GLB,,, | Performed by: PSYCHIATRY & NEUROLOGY

## 2022-01-06 PROCEDURE — 1159F MED LIST DOCD IN RCRD: CPT | Mod: CPTII,S$GLB,, | Performed by: PSYCHIATRY & NEUROLOGY

## 2022-01-06 PROCEDURE — 3008F PR BODY MASS INDEX (BMI) DOCUMENTED: ICD-10-PCS | Mod: CPTII,S$GLB,, | Performed by: PSYCHIATRY & NEUROLOGY

## 2022-01-06 PROCEDURE — 3078F DIAST BP <80 MM HG: CPT | Mod: CPTII,S$GLB,, | Performed by: PSYCHIATRY & NEUROLOGY

## 2022-01-06 PROCEDURE — 3078F PR MOST RECENT DIASTOLIC BLOOD PRESSURE < 80 MM HG: ICD-10-PCS | Mod: CPTII,S$GLB,, | Performed by: PSYCHIATRY & NEUROLOGY

## 2022-01-06 PROCEDURE — 3074F PR MOST RECENT SYSTOLIC BLOOD PRESSURE < 130 MM HG: ICD-10-PCS | Mod: CPTII,S$GLB,, | Performed by: PSYCHIATRY & NEUROLOGY

## 2022-01-06 PROCEDURE — 3008F BODY MASS INDEX DOCD: CPT | Mod: CPTII,S$GLB,, | Performed by: PSYCHIATRY & NEUROLOGY

## 2022-01-06 PROCEDURE — 1159F PR MEDICATION LIST DOCUMENTED IN MEDICAL RECORD: ICD-10-PCS | Mod: CPTII,S$GLB,, | Performed by: PSYCHIATRY & NEUROLOGY

## 2022-01-06 PROCEDURE — 99214 PR OFFICE/OUTPT VISIT, EST, LEVL IV, 30-39 MIN: ICD-10-PCS | Mod: S$GLB,,, | Performed by: PSYCHIATRY & NEUROLOGY

## 2022-01-06 PROCEDURE — 99999 PR PBB SHADOW E&M-EST. PATIENT-LVL III: ICD-10-PCS | Mod: PBBFAC,,, | Performed by: PSYCHIATRY & NEUROLOGY

## 2022-01-06 NOTE — PROGRESS NOTES
Follow up:   Presents with father   11/18/21 (not clear), 11/27/21   Dec 3 and 9 - event of 10-15 sec dizziness   Episodes of easily irritability at work   Pt is a poor historian     Prior note:   Sz log:   Oct 29 - GTC   Oct 30 - GTC (despite taking ativan 2 hrs before) trigger - anxiety, nervous   Today had a bout of dizziness and imbalance - this is a rare occurrence about 1-2 hrs after taking meds      Pt of Dr. Chan  Dizziness much improved    Last sz - 9/22/21 -   Semiology re-surgery - several thoughts race through mind, limb jerking    Semiology after surgery - distracted by thoughts, less jerking      Seizure log since last visit: none  - last sz: 1/19/2021  - possible sz 4/28 ?unsure   - mostly dizziness is the issue     Current AEDs:   -  mg bid  (200mg 1 1/2 tabs bid)   - PER 6 mg q day (2mg 3 tabs q AM)   - CLOB 40 mg bid (20mg 2 tabs bid) - suggested increase at last visit 50mg bid - but patient decreased to 30mg bid due to dizziness  PCP decreased 20mg-30mg - per pt and his father, the change has decreased the dizziness over the past week  - LTG 600mg bid (200mg 3 tabs bid)  - VNS      Results for GEORGETTE MONCADA JR. (MRN [id number]) as of 5/31/2021 08:29    Ref. Range 4/24/2019 14:03 5/6/2020 11:44 7/31/2020 10:06 5/14/2021 11:55   Clobazam Latest Ref Range: 30 - 300 ng/mL 916.0 (H) 849.0 (H) 805.0 (H) 859.0 (H)   Lacosamide Latest Ref Range: 1.0 - 10.0 mcg/mL 7.5 10.2 (H) 10.5 (H) 11.1 (H)   Lamotrigine Lvl Latest Ref Range: 2.0 - 15.0 ug/mL 13.2 16.1 (H) 14.5 14.1   Perampanel (Fycompa) Quant Latest Units: ng/mL       140   Desmethylclobazam Latest Ref Range: 300 - 3000 ng/mL 6180.0 (H) 7820.0 (H) 6160.0 (H) 5810.0 (H)         Notes from last clinic visit, 3/11/2021:  Seizure log since last visit:  1/19  12/2  10/12      Results for GEORGETTE MONCADA JR. (MRN [id number]) as of 3/11/2021 11:29    Ref. Range 7/13/2017 12:55 4/24/2019 14:03 5/6/2020 11:44 7/31/2020 10:06   Lacosamide  Latest Ref Range: 1.0 - 10.0 mcg/mL 6.8 7.5 10.2 (H) 10.5 (H)   Lamotrigine Lvl Latest Ref Range: 2.0 - 15.0 ug/mL 12.6 13.2 16.1 (H) 14.5   Perampanel (Fycompa) Quant Latest Units: ng/mL 160         Clobazam Latest Ref Range: 30 - 300 ng/mL 894 (H) 916.0 (H) 849.0 (H) 805.0 (H)   Desmethylclobazam Latest Ref Range: 300 - 3000 ng/mL 5904 (H) 6180.0 (H) 7820.0 (H) 6160.0 (H)      Current AEDs:   - LCS 300mg bid  (200mg 1 1/2 tabs bid)  - PER 6 mg q day (2mg 3 tabs q AM)   - CLOB 40 mg bid (20mg 2 tabs bid)  - LTG 600mg bid (200mg 3 tabs bid)  - VNS   Model # AspireSR M106  Implant Date: 2/21/2019  Is Device palpable in chest wall                     Yes  Side of implant                                                L                                                       Initial               Reprogram   Output current             mA      0.675               0.675  Signal Freq                 Hz        30                    30  Pulse width                 uSec    750                  1000    Signal on time             Sec      30                    30  Signal off time             Min      10.0                 10.0    Autostimulation (AS)  Output Current            mA       0.75                 0.75  Pulse width                 uSec    750                  1000  Signal on time             Sec      60    Magnet settings  Output current             mA       0.75                 0.75  Pulse width                 uSec    750                  1000  Signal on time             Sec      60                    60     Tachycardia detect                 On/Off              On/off  Threshhold for AS       %                                         Seizure hx:   - onset at age 8 years     - RUE and head jerking movements -> GTC sz   - no hx of tongue/cheek bite or bowel incontinence; infrequently bladder incontinence  - epilepsy surgery done at [facility], [place] in 2008 - possibly less frequent per father; patient feels that he  is more aware of it coming on and is related to triggers  - VNS placed in 2019 - did not make any difference per father     Notes from last clinic visit with , 8/12/2020:  Patient may have had a seizure on Jun 30.  He had a typical warning, swiped the VNS and did not lose awareness.  That is the only symptoms he experienced.  He is not experiencing any AEs from the meds. He does not have any other medical issues.    2020/05/14  He has noticed intermittent dizziness particularily with change in posture.  Dad fell his comprehension is not as good.  He reports two seizures - 5 days ago after missed AM dose.  The second occurred at work when he went to see his supervisor. 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  Starting 2-3 weeks after last visit he began to have the intermittyent dizziness.  Lamictal level is high enough that there may be a pharmacodynamic interaction with lacosamide.  2019/12/12  Since the last visit he had only two seizures and he retained awareness with both.  He has been taking ativan when he goes to football games.  Lamictal levels have been stable and the clearance calculated from the dose/level indicates he is a rapid metabolizer.    2019/08/29  Patient reports three sz since last visit.  All have been partial with no convulsions.  He is tolerating the VNS well.  He has not been using the magnet to block seizures..  He is tolerating the medication well.  In the past he would usually have a seizure when he went to a game.  I have given him 1 mg ativan to take 1 hrs before a game and  This has worked well in preventing seizures.    2019/04/24  Doing well since last visit, no reported seizures. Tolerated initial VNS settings well. Returns today for continued VNS adjustment. Medication regimen unchanged.   2019/03/27  VNS implanted 2/21/19, tolerated procedure well. He was seen by NSGY in a virtual visit 3/7/19 for his 2 week post-op appointment, incision  clean/dry/intact and well approximated at that time, and on exam today. Patient/family report no recent seizures. Continues to take Lacosamide, Clobazam, Lamotrigine, and Perampanel as prescribed. Presents today for first VNS adjustment.   2018/09/13  Patient reports 3 mild CPS since the last visit.  Last visit I gave him a schedule to increase lacosamide in two steps to 200 mg 1½ BID.  When he reached this dose he became dizzy and reduced the morning dose to 1 tab with resolution of the dizzinesss.  Last visit I discussed VNS treatment and today the patient wants to proceed with implanting the device.  He has no other medical problems except allergies.  He does take loratadine which works on the H1 reception similar to Benadryl but has not been been implicated in lowering seizure threshhold.    2018/01/26  The patient reports 3 sz since the last visit.  The most recent may have resulted from missed dose.  No triggers evident for the other 2 sz.  The patient reports he has an aura but can not describe what he perceives.    2017/07/13  In the past 6 months the patient has experienced five seizures (about 1 per month).   The patient was having difficulty expressing himself - speech is halting and disconnected.  He has been taking ativan prior to social event or watching sports to prevent seizure.  He has been taking ativan about 2 times a month.    2016/04/19 and 2017/01/23              Since the last visit the patient has experienced one seizures (12 Jan 2017) .  Meds were increased last time and he is tolerating the new dosages well.  There was no obvious provocative reason.  He has a good strategy for compliance.  He had no concurrent medical problem and had not taken benadryl or other proconvulsive medication.    pt has had the following episodes:  2015/4/8 - Pt had just finished a walk.  Eye blinking and stereotypic hand movements.  No generalized convulsions were noted. Didn't lose consciousness.  Lasted 30s to  1 minute.  2015/6/6 - Pt was in his room.  Came to his dad to give him his phone to record what was happening.  Said he has the feeling that he's about to have a seizure.  Says there is no aura per se.  Says a thought that he's about to have a seizure pops into his head, which usually precedes his events.  Said his speech got markedly worse during the event.  Eye blinking lasted 30s to 1 minute and resolved spontaneously.  Also had stereotypic hand movements and eye blinking.  Maintained consciousness throughout episode.  Pt had no memory of the seizure, which was relayed by his dad.     2015/6/8 - Pt was running around a track.  Said he overexerted himself over the last three laps.  Said he had another event immediately after exercise.  Again, this episode was brief (30s-1min) and was characterized by walking in circles, eye blinking, and stereotypic hand motions of rubbing his fingers. Had no intraictal recall.       Previous History (2015/4/1)              The patient was referred for consultation by Dr. Guerrero due to want to transfer care here due to long distance, patient is living [facility].  The patient was accompanied by his mother and father who provided some of the history.       Seizure surgery 2008 for epilepsy. Since then, less frequent and not as strong, still have seizure twice a month with current medication dosage. He is compliant with medication, tolerates medication well, no side effect noticed. On current regime for about 5 years, change to onfi 2-3 years ago in place of keppra.         Seizure Seminology  Seizure Type 1  Classification:   Aura - feel it is coming, his mind told him that seizure is coming  Ictus  - Nonconv -  - Conv - whole body jerking more on the right side, after a couple of jerk, he is out, first started has tongue biting and incontinence  - Duration - 2 minutes  Post-ictal  - Symptoms: can not talk, sleepiness no headache  - Duration: 5-10 minutes  Age of onset : 6 year  old  Current Seizure Frequency -  Initially once week,  Last Seizure: 10 years ago     Seizure Type 2: after seizure  Classification:   Aura - feel it is coming, but not as strong as before  Ictus  - Nonconv -  - Conv - blinking and body jerk more on the arm.   - Duration - 30 sec  Post-ictal  - Symptoms: no sleepiness  - Duration: back to baseline after ictus right away  Age of onset : 29  Current Seizure Frequency -  Last Seizure: a couple weeks ago  Triggers: stress, missed meds, computer use, overexertion     Any other relevant information:  - none     PREVIOUS EVALUATIONS:    Previous EEGs:   - per 's notes, C-EEG in 2014: 'not abnormal'     Previous MRIs:  - per 's notes, MRI in 2015: 'no new changes'     Additional tests:  1)CT Scan:   2) EEG\Video Monitoring: no  3) PET Scan: no  4) Neuropsychological evaluation: no  5) DEXA Scan: no  6) Others: no     RISK FACTORS FOR SEIZURES:    1. Head Trauma:  No    2. CNS Infections:  No  3. CNS Tumors: No     4. CNS Vascular Disease: No     5. Febrile Seizures: No    6. Developmental Delay: No       7. Family History of Seizures: No    8. Birth history: FTNVD     Pregnancy/Labor/Delivery: n/a     CURRENT MEDICATIONS:   Current Medications               Current Outpatient Medications   Medication Sig Dispense Refill    cloBAZam (ONFI) 20 mg Tab TAKE TWO TABLETS BY MOUTH TWICE DAILY. GENERIC EQUIVALENT TO ONFI. 360 tablet 2    FLUARIX QUAD 8645-5235, PF, 60 mcg (15 mcg x 4)/0.5 mL Syrg vaccine     0    lacosamide (VIMPAT) 200 mg Tab tablet Take 1.5 tablets (300 mg total) by mouth every 12 (twelve) hours. 270 tablet 2    lamoTRIgine (LAMICTAL) 200 MG tablet Take 3 tablets (600 mg total) by mouth 2 (two) times daily. 540 tablet 2    LORazepam (ATIVAN) 1 MG tablet Take 1 tablet (1 mg total) by mouth as needed (seizure). 5 tablet 0    multivitamin (THERAGRAN) per tablet Take 1 tablet by mouth once daily.        perampaneL (FYCOMPA) 2 mg tablet Take  3 tablets (6 mg total) by mouth once daily. 270 tablet 2      No current facility-administered medications for this visit.         Folic acid: no     CURRENT ANTI EPILEPTIC MEDICATIONS:  See hpi     VAGAL NERVE STIMULATOR: see hpi     PRIOR ANTICONVULSANT HISTORY:   carbamazepine (Tegretol, CBZ):        tried in the past  ethosuximide (Zarontin, ESM):           tried in the past  felbamate (felbatol, FBM):                  tried in the past  gabapentin (Neurontin, GPN):            tried in the past  levetiracetam (Keppra, LEV):             tried in the past  phenytoin (Dilantin, PHT):                  tried in the past  primidone (Mysoline, PRM):               tried in the past  topiramate (Topamax, TPM):             tried in the past  valproic acid (Depakote, VPA):          tried in the past   clorazepate (Tranxene, CLZ):            Stopped previous visits.       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    Environmental and seasonal allergies 09/13/2018    Epilepsy 02/21/2019    S/P placement of VNS (vagus nerve stimulation) device 03/27/2019              Resolved Ambulatory Problems     Diagnosis Date Noted    No Resolved Ambulatory Problems      No Additional Past Medical History      PAST PSYCHIATRIC HISTORY:  no     PAST SURGICAL HISTORY including Epilepsy surgery:             Past Surgical History:   Procedure Laterality Date    epilepsy surgery Left 2008    VAGUS NERVE STIMULATOR INSERTION Left 2/21/2019     Procedure: INSERTION, VAGUS NERVE STIMULATOR;  Surgeon: Teddy Saldana MD;  Location: [facility] OR [address] [place] [address];  Service: Neurosurgery;  Laterality: Left;  toronto II, asa 2, regular bed, supine,      FAMILY HISTORY:             Family History   Problem Relation Age  of Onset    Diabetes Father      Cancer Father      Diabetes Sister      Cancer Paternal Aunt      Diabetes Paternal Uncle      Cancer Paternal Grandmother      Diabetes Paternal Grandfather      Cancer Paternal Grandfather         SOCIAL HISTORY:               Social History                   Socioeconomic History    Marital status: Single       Spouse name: Not on file    Number of children: Not on file    Years of education: Not on file    Highest education level: Not on file   Occupational History    Not on file   Tobacco Use    Smoking status: Never Smoker   Substance and Sexual Activity    Alcohol use: No    Drug use: No    Sexual activity: Not on file   Other Topics Concern    Not on file   Social History Narrative    Not on file      Social Determinants of Health            Financial Resource Strain:     Difficulty of Paying Living Expenses:    Food Insecurity:     Worried About Running Out of Food in the Last Year:     Ran Out of Food in the Last Year:    Transportation Needs:     Lack of Transportation (Medical):     Lack of Transportation (Non-Medical):    Physical Activity:     Days of Exercise per Week:     Minutes of Exercise per Session:    Stress:     Feeling of Stress :    Social Connections:     Frequency of Communication with Friends and Family:     Frequency of Social Gatherings with Friends and Family:     Attends Restorationist Services:     Active Member of Clubs or Organizations:     Attends Club or Organization Meetings:     Marital Status:             a) Marital status: Single                                                    b) Living situation: patient lives with parents  c) Employed/Unemployed/Other: Employed full time  - 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      REVIEW OF  SYSTEMS:  Review of Systems     GENERAL EXAMINATION:  There were no vitals taken for this visit.      General Appearance:    This is an average built male who appears well.  HEENT: There are no dysmorphic features  Skin: There are no obvious stigmata for neurocutaneous disorders.  Neck: not assessed  Cardiovascular:not assessed  Lungs:not assessed  Abdomen: deferred  Spine: not assessed  Extremities: not assessed     NEUROLOGICAL EXAMINATION:   Mental status: Alert and responsive  Content - tangential thoughts   Speech: normal  Dysarthria: No   Eyes: PERRL; EOM intact; No nystagmus.  Fundoscopic Exam: not assessed  No facial asymmetry. Facial sensation: not assessed  Hearing was intact bilaterally  Tongue and palate; SCM and trapezii bilaterally: not assessed      Motor examination:   Normal bulk and tone bilaterally. Power: no obvious deficits  Abnormal movements: none  Deep tendon reflexes: not assessed  Action tremor bella      Sensory examination: not assessed     Gait:  Normal gait and station    IMPRESSION:   Focal epilepsy, onset at age 8 years  intractable s/p epi surgery at [facility] in [place] (left temporal resection, 2008) and VNS (2019)  Speech fluency impaired   Episodes of brief dizziness - forced displacement? He gets irritable after the event   Feels irritable when overstimulated by people around him are talking      Plan:   1, Current AEDs:   - LCS 300mg bid  (200mg 1 1/2 tabs bid) -> 200 mg BID  - PER 6 mg q day (2mg 3 tabs q AM)   - CLOB 20 mg AM, 20 mg PM (dizziness at higher doses)   - LTG 600mg bid (200mg 3 tabs bid)  - VNS stable   - continue to see SW/therapist (the pt expressed extreme frustration to his father about communication issues)   - EEG  Will consider EMU/amb EEG    Refer to Neuropsych for memory dysfunction (he reports sensory overload and trouble with short term memory)      S/P placement of VNS (vagus nerve stimulation) device  VNS was interrogated today with no changes  made     Model # AspireSR M106  Implant Date: 2/21/2019  Is Device palpable in chest wall                     Yes  Side of implant                                                L                                                             Output current             mA       0.675               Signal Freq                 Hz        30                      Pulse width                 uSec    1000               Signal on time             Sec      30                     Signal off time             Min      10.0                   Duty cycle 5%    Autostimulation (AS)  Output Current            mA       0.75                  Pulse width                 uSec    1000                   Signal on time             Sec      60    Magnet settings  Output current             mA       0.75                  Pulse width                 uSec    1000                    Signal on time             Sec      60                       Tachycardia detect     On/Off                Threshhold for AS       %                                      Lead impedence 3289 Ohms  Generator battery:  %     Model # AspireSR M106  Implant Date: 2/21/2019  Is Device palpable in chest wall                     Yes  Side of implant                                                L                                                             Output current             mA       0.625               Signal Freq                 Hz        30                      Pulse width                 uSec    500               Signal on time             Sec      30                     Signal off time             Min      5.0                   Duty cycle 5%    Autostimulation (AS)  Output Current            mA       0.75                  Pulse width                 uSec    500                   Signal on time             Sec      60    Magnet settings  Output current             mA       0.75                  Pulse width                 uSec    500                    Signal  on time             Sec      60                       Tachycardia detect     On/Off                Threshhold for AS       %                                      Lead impedence 3347 Ohms  Generator battery:  %

## 2022-01-09 NOTE — PROCEDURES
EEG,w/awake & asleep record    Date/Time: 1/6/2022 11:00 AM  Performed by: Harvey Roberts MD  Authorized by: Harvey Roberts MD         This EEG was performed to assess for evidence of underlying epilepsy.     ELECTROENCEPHALOGRAM REPORT     METHODOLOGY:  Electroencephalographic (EEG) recording is with electrodes placed according to the International 10-20 placement system.  Thirty two (32) channels of digital signal are simultaneously recorded from the scalp and may include EKG, EMG, and/or eye monitors.   Recording band pass was 0.1 to 512 hz.  Digital video recording of the patient is simultaneously recorded with the EEG.  The staff report clinical symptoms and may press an event button when the patient has symptoms of clinical interest to the treating physicians.  EEG and video recording is stored and archived in digital format.  The entire recording is visually reviewed, and the times identified by computer analysis as being spikes or seizures are reviewed again.  Activation procedures which include photic stimulation, hyperventilation and instructing patients to perform simple task are done in selected patients.   Compresses spectral analysis (CSA) is also performed on the activity recorded from each individual channel.  This is displayed as a power display of frequencies from 0 to 30 Hz over time.   The CSA analysis is done and displayed continuously.  This is reviewed for asymmetries in power between homologous areas of the scalp and for presence of changes in power which can be seen when seizures occur.  Sections of suspected abnormalities on the CSA is then compared with the original EEG recording.                [employer] software was also utilized in the review of this study.  This software suite analyzes the EEG recording in multiple domains.  Coherence and rhythmicity is computed to identify EEG sections which may contain organized seizures.  Each channel undergoes analysis to detect presence of spike and  sharp waves which have special and morphological characteristic of epileptic activity.  The routine EEG recording is converted from spacial into frequency domain.  This is then displayed comparing homologous areas to identify areas of significant asymmetry.  Algorithm to identify non-cortically generated artifact is used to separate eye movement, EMG and other artifact from the EEG.     EEG FINDINGS:  The recording was obtained with a number of standard bipolar and referential montages during wakefulness and drowsiness.  In the alert state, the posterior background rhythm was a symmetric, well-modulated 10 to 11 Hz alpha rhythm, which reacted symmetrically to eye opening.  Intermittent photic stimulation failed to evoke symmetric posterior driving responses. Hyperventilation produced physiological slowing.  No abnormalities were activated by photic stimulation or hyperventilation.  During drowsiness, the background rhythm waxed and waned and there were periods of slowing.   Intermittent right frontotemporal focal polymorphic mixed delta range slowing was noted.  During drowsiness sharp waves were noted maximally at T3-T5 along with C3 P3.   Excessive beta activity was noted throughout the record which was diffuse.     The EKG channel revealed a sinus rhythm.     IMPRESSION:  This is an abnormal EEG during wakefulness and drowsiness.  Right frontotemporal intermittent focal slowing was noted.  Left central temporoparietal maximum sharp waves were noted.     CLINICAL CORRELATION:  The patient is a 46-year-old male with a history of focal epilepsy status post left temporal resection who is currently maintained on lamotrigine, lacosamide, perampanel and clobazam.  This is an abnormal EEG during wakefulness and drowsiness.  The presence of right frontotemporal intermittent focal slowing suggestive focal neural dysfunction in this region.  The presence of left central temporoparietal maximum sharp waves confers increased  risk of focal seizures from this region.  This study no seizures were recorded.

## 2022-01-10 ENCOUNTER — DOCUMENTATION ONLY (OUTPATIENT)
Dept: NEUROLOGY | Facility: CLINIC | Age: 47
End: 2022-01-10
Payer: COMMERCIAL

## 2022-01-10 NOTE — PROGRESS NOTES
Email sent to [email or web address]     Good afternoon Nicholas,     I think you mentioned seizure education or something in regards to employers.     The [facility] [place] provides information and virtual training classes on Epilepsy First Aid.   I know you live in [place], but the information is still helpful.   [email or web address]    Seizure First Aid and Safety Training - General Audience  On the third Monday of every month at 6:30 PM we host a virtual Seizure First Aid & Safety Training Class.    You can register here: [email or web address]  The director is Natasha Ann   Ph: [phone]  Email: [email or web address]    If not you can also click on the link below for the [facility] [place] in regards to first aid for seizures.   SEIZURE FIRST AID - [facility] ([email or web address])      Ethan Cage [facility]    Clinical  -ALS, Epilepsy, Headache  [facility]  [email or web address]  Phone# [phone]     [phone]  Fax # [phone]

## 2022-01-10 NOTE — PROGRESS NOTES
Epilepsy  met with patient and patient's father briefly following patient's visit with neurologist.   Patient presented with labile mood.  Patient accepting and happy to see SW but patient became irritable when talking about his parents.   Patient's speech and thought process are circumstantial most of the conversation.     Patient began by reporting that he feels regret at not following up with  after  reached out to patient.   SW noted it was ok and patient can reach out whenever he needs.     Patient reports feeling irritable due to his parents and the way they treat him.     Stephanie reports his parents treat him like a child in his day to day living.   Patient reported that his parents will not let him go out of the house to see friends.     SW attempted to get a clear example of when this last happened.   Patient unable to note when this last happened.     Patient also reported that someone encouraged him to education his employers in regards to seizures.   SW recommended seizure first aid by [facility] or epilepsy [facility].     Visit cut short as patient needed to complete a test.         Ethan Cage LCSW    Clinical  - ALS, Epilepsy, Headache   [facility]

## 2022-01-21 DIAGNOSIS — G40.219 LOCALIZATION-RELATED SYMPTOMATIC EPILEPSY AND EPILEPTIC SYNDROMES WITH COMPLEX PARTIAL SEIZURES, INTRACTABLE, WITHOUT STATUS EPILEPTICUS: ICD-10-CM

## 2022-01-21 RX ORDER — LACOSAMIDE 200 MG/1
300 TABLET ORAL EVERY 12 HOURS
Qty: 270 TABLET | Refills: 4 | Status: SHIPPED | OUTPATIENT
Start: 2022-01-21 | End: 2022-07-27

## 2022-01-21 RX ORDER — LAMOTRIGINE 200 MG/1
600 TABLET ORAL 2 TIMES DAILY
Qty: 540 TABLET | Refills: 4 | Status: SHIPPED | OUTPATIENT
Start: 2022-01-21 | End: 2023-01-18 | Stop reason: SDUPTHER

## 2022-01-21 RX ORDER — CLOBAZAM 20 MG/1
20 TABLET ORAL 2 TIMES DAILY
Qty: 60 TABLET | Refills: 5 | Status: SHIPPED | OUTPATIENT
Start: 2022-01-21 | End: 2022-01-26 | Stop reason: SDUPTHER

## 2022-01-21 NOTE — TELEPHONE ENCOUNTER
----- Message from [facility] sent at 1/21/2022  9:38 AM CST -----  .Type:  RX Refill Request    Who Called: PT     Refill or New Rx: REFILLS     RX Name and Strength:     cloBAZam (ONFI) 20 mg Tab    lacosamide (VIMPAT) 200 mg Tab tablet    lamoTRIgine (LAMICTAL) 200 MG tablet    perampaneL (FYCOMPA) 2 mg tablet          Preferred Pharmacy with phone number: [facility] [phone]       Pt Call Back Number:[phone]    Additional Information: Thank You

## 2022-01-26 DIAGNOSIS — G40.219 LOCALIZATION-RELATED SYMPTOMATIC EPILEPSY AND EPILEPTIC SYNDROMES WITH COMPLEX PARTIAL SEIZURES, INTRACTABLE, WITHOUT STATUS EPILEPTICUS: ICD-10-CM

## 2022-01-31 RX ORDER — CLOBAZAM 20 MG/1
20 TABLET ORAL 2 TIMES DAILY
Qty: 180 TABLET | Refills: 4 | Status: SHIPPED | OUTPATIENT
Start: 2022-01-31 | End: 2022-07-27

## 2022-02-16 ENCOUNTER — OFFICE VISIT (OUTPATIENT)
Dept: NEUROLOGY | Facility: CLINIC | Age: 47
End: 2022-02-16
Payer: COMMERCIAL

## 2022-02-16 DIAGNOSIS — G40.219 LOCALIZATION-RELATED SYMPTOMATIC EPILEPSY AND EPILEPTIC SYNDROMES WITH COMPLEX PARTIAL SEIZURES, INTRACTABLE, WITHOUT STATUS EPILEPTICUS: Primary | ICD-10-CM

## 2022-02-16 DIAGNOSIS — F07.0 TEMPORAL LOBECTOMY BEHAVIOR SYNDROME: ICD-10-CM

## 2022-02-16 DIAGNOSIS — Z96.89 S/P PLACEMENT OF VNS (VAGUS NERVE STIMULATION) DEVICE: ICD-10-CM

## 2022-02-16 DIAGNOSIS — R41.89 SIGNS AND SYMPTOMS INVOLVING COGNITION: ICD-10-CM

## 2022-02-16 PROCEDURE — 96121 NUBHVL XM PHY/QHP EA ADDL HR: CPT | Mod: 95,,, | Performed by: PSYCHIATRY & NEUROLOGY

## 2022-02-16 PROCEDURE — 96116 NUBHVL XM PHYS/QHP 1ST HR: CPT | Mod: 95,,, | Performed by: PSYCHIATRY & NEUROLOGY

## 2022-02-16 PROCEDURE — 99499 UNLISTED E&M SERVICE: CPT | Mod: 95,,, | Performed by: PSYCHIATRY & NEUROLOGY

## 2022-02-16 PROCEDURE — 99499 NO LOS: ICD-10-PCS | Mod: 95,,, | Performed by: PSYCHIATRY & NEUROLOGY

## 2022-02-16 PROCEDURE — 96121 PR NEUROBEHAVIORAL STAT EXAM, EA ADDTL HR: ICD-10-PCS | Mod: 95,,, | Performed by: PSYCHIATRY & NEUROLOGY

## 2022-02-16 PROCEDURE — 96116 PR NEUROBEHAVIORAL STATUS EXAM BY PSYCH/PHYS: ICD-10-PCS | Mod: 95,,, | Performed by: PSYCHIATRY & NEUROLOGY

## 2022-02-16 NOTE — PROGRESS NOTES
NEUROPSYCHOLOGY CONSULT  [facility]      Referral Information  Name: Nicholas Nagel Jr. MRN: [id number]  Age: 46 y.o.    : 1975  Race: Black or  Handedness: Right  Gender: male    Education: 18 years         Referring Provider:   Harvey Roberts Md  [address] [place] [address]  Referral Reason/Medical Necessity: Mr. Nagel has a history of epilepsy. Neuropsychological evaluation was requested to assess current cognitive functioning, aid in differential diagnosis, and provide treatment recommendations.    Consent/Emergency Plan: The patient expressed an understanding of the purpose of the evaluation and consented to all procedures. I informed the patient of limits to confidentiality and discussed an emergency plan. Pt accompanied today by his father.  Procedures/Billing: Please see billing table at the end of this report.  Telemedicine:   The patient location is: [place]   The chief complaint leading to consultation/medical necessity is: R41.3 (ICD-10-CM) - Memory changes  Visit type: Virtual visit with synchronous audio and video   Total time spent with patient: 68 min  Each patient to whom I provide medical services by telemedicine is:  (1) informed of the relationship between the physician and patient and the respective role of any other health care provider with respect to management of the patient; and (2) notified that he or she may decline to receive medical services by telemedicine and may withdraw from such care at any time.      ASSESSMENT    Mr. Nagel is a 46 y.o. Black or  male with a history of epilepsy (s/p L temporal resection in  and VNS in ) presenting for evaluation of cognitive complaints. Cognition was reportedly normal prior to resection, declined following surgery and has been largely stable since then. Cognitive symptoms appear primarily attentional and frontally-mediated. He is employed and earned a  master's degree prior to surgery, but now requires some assistance with IADLs. Prior neuropsychological testing found primarily left frontotemporal dysfunction (language, attention, executive functioning). No issues with sleep or pain. He endorses some depression, irritability, and significant social isolation. Will revisit possible psychotherapy referral.       Problem List Items Addressed This Visit        Neuro    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 with complex partial seizures, intractable, without status epilepticus - Primary       Psychiatric    Temporal lobectomy behavior syndrome       Other    S/P placement of VNS (vagus nerve stimulation) device      Other Visit Diagnoses     Signs and symptoms involving cognition              PLAN     1. Mr. Nagel is scheduled for testing on 2/24/22. Full report to follow.       Thank you for allowing me to participate in Mr. Nagel's care.  If you have any questions, please contact me.    Debra Sanchez PsyD  Clinical Neuropsychologist  [facility] [place]        SUBJECTIVE:     HISTORY OF PRESENT ILLNESS   Mr. Nagel is a 46 y.o. Black or  male with a history of focal epilepsy since age 8. He is s/p left temporal resection (2008) and VNS (2009). He continues to have episodes of dizziness lasting 10 to 15 seconds, and reports episode of feeling frustrated when people at work do not seem to understand his epilepsy. Since resection, feels that cognition has been pretty stable, per dad.  Dad does think his memory got worse after the resection. Dad says prior to resection, pt was able to complete two degrees but since surgery he has really struggled to concentrate.     Onset: Age 8, unclear etiology   Semiology: Has difficulty describing semiology to me. Denies convulsive seizures recently but did have them in the past. Per neurology notes, pt has blinking and body jerk more on  "the arm for about 30 seconds.    Frequency: Every 3 weeks or so     Cognitive Sxs:  · Attention: Losing train of thought frequently. Difficulty with changing topics. Dad says this is worse after surgery.   · Mental Speed: Some slowed thinking.   · Memory: He is forgetful, tends to lose things.    · Language: Endorses word finding issues. This didn't really seem to get worse after the surgery, has been longstanding   · Visuospatial/Perceptual: Not getting lost. No issues with depth perception.   · Executive Functioning: Pt denies issues with multitasking, planning, organizing. He is a tangential historian.     Onset/Course: Mr. Nagel states that most of his symptoms were gradual in onset early in his life and are stable. His dad noted that his cognition worsened significantly following resection in 2008 and has stayed stable since then. The pt denies any exacerbating or ameliorating factors. No waxing/waning of cognitive status.  Medication for Cognition: None    Neuropsychiatric Sxs:  Pt denied a history of any formal mental health diagnosis or treatment.     · Self-Described Mood: "Pretty good."   · Depressed Mood: Endorsed episodes of sad mood in response to interpersonal stressors. Reports that sad thoughts get stuck in his head and can last a few days. Mood appears somewhat labile today. Appears to lack some insight into symptoms and becomes upset when dad provides history. He feels very socially isolated.   · Anxiety: Denied    · Panic Attacks: Denied    · Stress: Some stress related to relationship with dad, and how people react in public to his epilepsy.  · SI/HI: Endorsed history of possible passive SI related to difficulty with his girlfriend or parents when he feels overwhelmed and angry. Feels like he is holding in a lot of frustration when he feels like people aren't listening to him or are interrupting him when he is trying to speak. No actual thoughts of death or attempts or gestures. "   · Psychiatric Hospitalization: Denied    · Neurovegetative:  · Sleep: normal   · Total Hours/Night: 6   · Pattern: falls asleep easily and sleeps through the night  · TASHI: No  · Daytime Naps: Denied  · Appetite: normal   · Energy: normal   · Delusional/Paranoid Thinking: Denied  · Hallucinations: Denied  · Impulsive/Compulsive Behaviors: Denied  · Disinhibition: Denied, though he is definitely tangential today  · Apathy: Denied  · Irritability/Agitation: Endorsed    Physical  · Motor:  Denied abnormalities    · Gait:  Not observed (virtual/telephone visit) and Pt ambulates independently.   · Sensory: No change to taste, smell, hearing or vision and no paraesthesias    · Pain: Mr. Nagel described current pain at a 0 on a scale of 1-10, with 10 being worst.     Current Functioning (I/ADLs):  · ADLs: Independent   · IADLs:  · Finances: Independent, pays his own bills, manages his own banking. Dad is not on his bank accounts.   · Medication Mgmt:Requires assistance/oversight, dad has to remind him to take meds. He has an alarm but if he is engrossed in something on the computer will often ignore it and then forget to go back for meds later. Dad keeps track of when he needs refills. States dad is going to show him how to manage this.   · Driving: Does not participate in this activity due to seizures. This is a big source of depression because he does not live close to anyone. Has never been able to drive.    · Household Mgmt: Requires assistance/oversight, dad cooks, does grocery shopping. He thinks he could make simple meals if he had to. Parents handle all the housework.   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 Not getting in trouble at work.       RELEVANT HISTORY:  Prior Testing:  Five prior neuropsychological evaluations since 2008, summaries for two of which are available in the media tab:  · 3/12/15 with Dr Matt Pemberton at [facility]: Low average verbal  intelligence, stable from 2014 testing. Language continues to be an area of difficulty. Poor confrontation naming and semantic fluency. Lexical fluency is intact. Receptive language is normal. Visuospatial and processing speed are average. Attention/concentration is severely impaired. Learning is inefficient. Short-term auditory memory is moderately impaired. Delayed auditory recall is mildly to moderately impaired. Short term visual memory and delayed visual recall are average.   · 4/14/14 with Dr Rylie Link: impaired motor speed, verbal memory, attention/concentration, and some aspects of memory, consistent with left frontotemporal dysfunction.     Neurologic History  · Seizures: Endorsed, as above   · Stroke: Denied  · TBI: Denied  · Movement Disorder: Denied  · Falls: Denied  · CNS infection: Denied  · Brain tumor: Denied  · Headache/Migraine: Denied   · Urinary Incontinence: Denied  · Chronic UTI's:  Denied  · Prior Episode of Delirium:  Denied  · Other neurologic history: Denied  · Referral Diagnosis: R41.3 (ICD-10-CM) - Memory changes    Neurodiagnostics:  Report not available    Pertinent Labs:  No results found for: [id number]  No results found for: VITAMINB1  No results found for: RPR  No results found for: FOLATE  No results found for: TSH, U2YNUXM, O8YHGDA, THYROIDAB  Lab Results   Component Value Date    CALCIUM 10.1 02/21/2019      No results found for: LABA1C, HGBA1C  No results found for: HIV1X2, ONF57HJJD  Lab Results   Component Value Date    CREATININE 1.0 02/21/2019    BUN 13 02/21/2019     02/21/2019    K 4.3 02/21/2019     02/21/2019    CO2 26 02/21/2019      No results found for: ALT, AST, GGT, ALKPHOS, BILITOT       Current Outpatient Medications:     cloBAZam (ONFI) 20 mg Tab, Take 1 tablet (20 mg total) by mouth 2 (two) times daily. 1 am and 1 pm, Disp: 180 tablet, Rfl: 4    FLUARIX QUAD [id number], PF, 60 mcg (15 mcg x 4)/0.5 mL Syrg vaccine, , Disp: , Rfl: 0     lacosamide (VIMPAT) 200 mg Tab tablet, Take 1.5 tablets (300 mg total) by mouth every 12 (twelve) hours., Disp: 270 tablet, Rfl: 4    lamoTRIgine (LAMICTAL) 200 MG tablet, Take 3 tablets (600 mg total) by mouth 2 (two) times daily., Disp: 540 tablet, Rfl: 4    LORazepam (ATIVAN) 1 MG tablet, Take 1 tablet (1 mg total) by mouth as needed (seizure)., Disp: 5 tablet, Rfl: 0    multivitamin (THERAGRAN) per tablet, Take 1 tablet by mouth once daily., Disp: , Rfl:     perampaneL (FYCOMPA) 2 mg tablet, Take 3 tablets (6 mg total) by mouth once daily., Disp: 270 tablet, Rfl: 4    Medical history  Patient Active Problem List   Diagnosis    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 with complex partial seizures, intractable, without status epilepticus    Temporal lobectomy behavior syndrome    Environmental and seasonal allergies    Epilepsy    S/P placement of VNS (vagus nerve stimulation) device    Isolation, social     Past Medical History:   Diagnosis Date    Epilepsy 1981     Past Surgical History:   Procedure Laterality Date    epilepsy surgery Left 2008    VAGUS NERVE STIMULATOR INSERTION Left 2/21/2019    Procedure: INSERTION, VAGUS NERVE STIMULATOR;  Surgeon: Teddy Saldana MD;  Location: [facility] OR [address] [place] [address];  Service: Neurosurgery;  Laterality: Left;  toronto II, asa 2, regular bed, supine,       Substance Use History:  Social History     Tobacco Use    Smoking status: Never Smoker    Smokeless tobacco: Never Used   Substance and Sexual Activity    Alcohol use: No    Drug use: No    Sexual activity: Not on file   · Caffeine               Occasional soda    Family History:  Family History   Problem Relation Age of Onset    Diabetes Father     Cancer Father     Diabetes Sister     Cancer Paternal Aunt     Diabetes Paternal Uncle     Cancer Paternal Grandmother     Diabetes Paternal Grandfather     Cancer Paternal Grandfather      Family Neurologic History: Negative for  heritable risk factors   Family Psychiatric History: Negative for heritable risk factors     Developmental/Academic Hx:    Developmental: Born in [place], [place] and raised in [place], [place]. He has three older sister. Product of a normal pregnancy and delivery. No developmental delays. Dad reports everything was normal until he had his first seizure. No cognitive or behavioral issues until resection. English is their only language. No history of abuse.    Academic:  · Learning Difficulties: A's and B's. No history of formal learning disorder diagnosis. Never repeated a grade.  · Attention Difficulties: Denied. Never diagnosed with or treated for ADHD.  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 Used to live on campus at [employer] but [place] was around 9 or 10.   · Current Relationship/Family Status: Never , no children. He has a girlfriend, Jenelle, they have been dating for about three years. She lives about two hours away, they usually see each other about twice per month, though they talk on the phone frequently.   · Primary Source of Support: Pt feels socially [place]. One sister used to live with them, she no longer does. Family is supportive but pt is still frustrated.   · Daily Activities: Works full time. Spends most of his time on the internet. Used to watch TV, but doesn't like shows that are on TV these days.     MENTAL STATUS AND BEHAVIORAL OBSERVATIONS:  Appearance:  Casually dressed and Well groomed  Behavior:   alert, calm, cooperative and adequate rapport can be established  Orientation:   Fully oriented  Sensory:   Hearing and vision appeared  adequate for testing purposes.  Gait:   Not observed (virtual/telephone visit)   Psychomotor:  Within Normal Limits  Speech:  Fluent and spontaneous. Normal volume, rate, pitch, tone, and prosody.  Language:  Receptive and expressive language appear intact. Comprehends conversational speech., No evidence of word-finding difficulties in conversational speech.  Mood:   Irritable, particularly when interacting with his father  Affect:   mood-congruent  Thought Process: tangential  Thought Content: Denied current SI/HI. and No evidence of psychotic symptoms.  Memory:  Recent and remote appear grossly intact  Attn/Concentration:  Impaired  Fund of Knowledge: Broadly WNL  Judgment/Insight: Limited      BILLING INFORMATION:  Billing/Services Summary          Psychiatric Diagnostic Interview Base Code (64506)  Total Units: 0    Face-to-face Total Time: 0 min.         Neurobehavioral Status Exam Base Code (19806)   Total Units: 1 Add-on (24749)  Total Units: 1   Face-to-face 68 min.    Record Review, Integration, Report Generation 45 min.     Total Time: 113 min.    DOS is the date of the evaluation unless specified

## 2022-02-23 ENCOUNTER — OFFICE VISIT (OUTPATIENT)
Dept: NEUROLOGY | Facility: CLINIC | Age: 47
End: 2022-02-23
Payer: COMMERCIAL

## 2022-02-23 DIAGNOSIS — F02.818 MAJOR NEUROCOGNITIVE DISORDER DUE TO ANOTHER MEDICAL CONDITION WITH BEHAVIORAL DISTURBANCE: ICD-10-CM

## 2022-02-23 DIAGNOSIS — Z96.89 S/P PLACEMENT OF VNS (VAGUS NERVE STIMULATION) DEVICE: ICD-10-CM

## 2022-02-23 DIAGNOSIS — F07.0 TEMPORAL LOBECTOMY BEHAVIOR SYNDROME: ICD-10-CM

## 2022-02-23 DIAGNOSIS — G40.219 LOCALIZATION-RELATED SYMPTOMATIC EPILEPSY AND EPILEPTIC SYNDROMES WITH COMPLEX PARTIAL SEIZURES, INTRACTABLE, WITHOUT STATUS EPILEPTICUS: Primary | ICD-10-CM

## 2022-02-23 PROCEDURE — 96139 PSYCL/NRPSYC TST TECH EA: CPT | Mod: S$GLB,,, | Performed by: PSYCHIATRY & NEUROLOGY

## 2022-02-23 PROCEDURE — 96133 NRPSYC TST EVAL PHYS/QHP EA: CPT | Mod: S$GLB,,, | Performed by: PSYCHIATRY & NEUROLOGY

## 2022-02-23 PROCEDURE — 96133 PR NEUROPSYCHOLOGIC TEST EVAL SVCS, EA ADDTL HR: ICD-10-PCS | Mod: S$GLB,,, | Performed by: PSYCHIATRY & NEUROLOGY

## 2022-02-23 PROCEDURE — 96138 PR PSYCH/NEUROPSYCH TEST ADMIN/SCORING, BY TECH, 2+ TESTS, 1ST 30 MIN: ICD-10-PCS | Mod: S$GLB,,, | Performed by: PSYCHIATRY & NEUROLOGY

## 2022-02-23 PROCEDURE — 99499 NO LOS: ICD-10-PCS | Mod: S$GLB,,, | Performed by: PSYCHIATRY & NEUROLOGY

## 2022-02-23 PROCEDURE — 96132 PR NEUROPSYCHOLOGIC TEST EVAL SVCS, 1ST HR: ICD-10-PCS | Mod: S$GLB,,, | Performed by: PSYCHIATRY & NEUROLOGY

## 2022-02-23 PROCEDURE — 99999 PR PBB SHADOW E&M-EST. PATIENT-LVL I: CPT | Mod: PBBFAC,,, | Performed by: PSYCHIATRY & NEUROLOGY

## 2022-02-23 PROCEDURE — 99999 PR PBB SHADOW E&M-EST. PATIENT-LVL I: ICD-10-PCS | Mod: PBBFAC,,, | Performed by: PSYCHIATRY & NEUROLOGY

## 2022-02-23 PROCEDURE — 96138 PSYCL/NRPSYC TECH 1ST: CPT | Mod: S$GLB,,, | Performed by: PSYCHIATRY & NEUROLOGY

## 2022-02-23 PROCEDURE — 96132 NRPSYC TST EVAL PHYS/QHP 1ST: CPT | Mod: S$GLB,,, | Performed by: PSYCHIATRY & NEUROLOGY

## 2022-02-23 PROCEDURE — 99499 UNLISTED E&M SERVICE: CPT | Mod: S$GLB,,, | Performed by: PSYCHIATRY & NEUROLOGY

## 2022-02-23 PROCEDURE — 96139 PR PSYCH/NEUROPSYCH TEST ADMIN/SCORING, BY TECH, 2+ TESTS, EA ADDTL 30 MIN: ICD-10-PCS | Mod: S$GLB,,, | Performed by: PSYCHIATRY & NEUROLOGY

## 2022-03-15 NOTE — PROGRESS NOTES
NEUROPSYCHOLOGY CONSULT  [facility]      Referral Information  Name: Nicholas Nagel Jr. MRN: [id number]  Age: 46 y.o.    : 1975  Race: Black or  Handedness: Right  Gender: male    Education: 18 years         Referring Provider:   Harvey Roberts Md  [address] [place] [address]  Referral Reason/Medical Necessity: Mr. Nagel has a history of epilepsy. Neuropsychological evaluation was requested to assess current cognitive functioning, aid in differential diagnosis, and provide treatment recommendations.    Consent/Emergency Plan: The patient expressed an understanding of the purpose of the evaluation and consented to all procedures. I informed the patient of limits to confidentiality and discussed an emergency plan. Pt accompanied today by his father.  Procedures/Billing: Please see billing table at the end of this report.    ASSESSMENT    Mr. Nagel is a 46 y.o. Black or  male with a history of epilepsy (s/p L temporal resection in  and VNS in ) presenting for evaluation of cognitive complaints. Cognition was reportedly normal prior to resection, declined following surgery and has been largely stable since then. Cognitive symptoms appear primarily attentional and frontally-mediated. 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 He does not drive due to seizures, which is quite limiting functionally, and lives at home where his parents provide some support for IADLs. Prior neuropsychological testing found primarily left frontotemporal dysfunction (language, attention, executive functioning). No issues with sleep or pain. He endorses depression, irritability, and significant social isolation.     Neuropsychological testing again found expected deficits in left frontotemporal skills  (verbal memory, naming, semantic fluency). There is some additional mild variability in attention and processing speed, but other cognitive domains are largely preserved. I do not have access to prior neuropsych test scores (only the summary), so I cannot do a direct comparison, but his profile today is similar to prior testing and I do not have strong evidence of progressive decline. Executive functioning scores are WNL, but it is notable that neuropsychological testing is better able to tap into dorsolateral prefrontal cortex functioning, and he may be experiencing some emotional dysregulation via disruption of temporal-orbitofrontal connections as well, though we are less able to capture this on testing. Emotional factors, though likely at least partially related to resection, are also themselves exacerbating factors at this point. He would benefit from psychotherapy to address mood issues. Will refer to behavioral health if pt agreeable.       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 Mr. Nagel is scheduled for feedback. Will discuss results, strengths/weaknesses, compensatory strategies, general brain health, mood management  2. Will revisit possible psychotherapy referral. He does speak to epilepsy SW, Ethan Cage every few months, but would benefit from more intensive behavioral treatment. Will refer to behavioral health if pt agreeable.  3. Mr. Cage may be aware of transportation or housing resources that would help Mr. Nagel get more independence?        Thank you for  allowing me to participate in Mr. Nagel's care.  If you have any questions, please contact me.    Debra Sanchez PsyD  Clinical Neuropsychologist  [facility] - Department of Neurology  [facility]        SUBJECTIVE:     HISTORY OF PRESENT ILLNESS   Mr. Nagel is a 46 y.o. Black or  male with a history of focal epilepsy since age 8. He is s/p left temporal resection (2008) and VNS (2009). He continues to have episodes of dizziness lasting 10 to 15 seconds, and reports episode of feeling frustrated when people at work do not seem to understand his epilepsy. Since resection, feels that cognition has been pretty stable, per dad.  Dad does think his memory got worse after the resection. Dad says prior to resection, pt was able to complete two degrees but since surgery he has really struggled to concentrate.     Onset: Age 8, unclear etiology   Semiology: Has difficulty describing semiology to me. Denies convulsive seizures recently but did have them in the past. Per neurology notes, pt has blinking and body jerk more on the arm for about 30 seconds.    Frequency: Every 3 weeks or so     Cognitive Sxs:  · Attention: Losing train of thought frequently. Difficulty with changing topics. Dad says this is worse after surgery.   · Mental Speed: Some slowed thinking.   · Memory: He is forgetful, tends to lose things.    · Language: Endorses word finding issues. This didn't really seem to get worse after the surgery, has been longstanding   · Visuospatial/Perceptual: Not getting lost. No issues with depth perception.   · Executive Functioning: Pt denies issues with multitasking, planning, organizing. He is a tangential historian.     Onset/Course: Mr. Nagel states that most of his symptoms were gradual in onset early in his life and are stable. His dad noted that his cognition worsened significantly following resection in 2008 and has stayed stable since then. The pt denies any exacerbating or  "ameliorating factors. No waxing/waning of cognitive status.  Medication for Cognition: None    Neuropsychiatric Sxs:  Pt denied a history of any formal mental health diagnosis or treatment.     · Self-Described Mood: "Pretty good."   · Depressed Mood: Endorsed episodes of sad mood in response to interpersonal stressors. Reports that sad thoughts get stuck in his head and can last a few days. Mood appears somewhat labile today. Appears to lack some insight into symptoms and becomes upset when dad provides history. He feels very socially isolated.   · Anxiety: Denied    · Panic Attacks: Denied    · Stress: Some stress related to relationship with dad, and how people react in public to his epilepsy.  · SI/HI: Endorsed history of possible passive SI related to difficulty with his girlfriend or parents when he feels overwhelmed and angry. Feels like he is holding in a lot of frustration when he feels like people aren't listening to him or are interrupting him when he is trying to speak. No actual thoughts of death or attempts or gestures.   · Psychiatric Hospitalization: Denied    · Neurovegetative:  · Sleep: normal   · Total Hours/Night: 6   · Pattern: falls asleep easily and sleeps through the night  · TASHI: No  · Daytime Naps: Denied  · Appetite: normal   · Energy: normal   · Delusional/Paranoid Thinking: Denied  · Hallucinations: Denied  · Impulsive/Compulsive Behaviors: Denied  · Disinhibition: Denied, though he is definitely tangential today  · Apathy: Denied  · Irritability/Agitation: Endorsed    Physical  · Motor:  Denied abnormalities    · Gait:  Not observed (virtual/telephone visit) and Pt ambulates independently.   · Sensory: No change to taste, smell, hearing or vision and no paraesthesias    · Pain: Mr. Nagel described current pain at a 0 on a scale of 1-10, with 10 being worst.     Current Functioning (I/ADLs):  · ADLs: Independent   · IADLs:  · Finances: Independent, pays his own bills, manages his own " banking. Dad is not on his bank accounts.   · Medication Mgmt:Requires assistance/oversight, dad has to remind him to take meds. He has an alarm but if he is engrossed in something on the computer will often ignore it and then forget to go back for meds later. Dad keeps track of when he needs refills. States dad is going to show him how to manage this.   · Driving: Does not participate in this activity due to seizures. This is a big source of depression because he does not live close to anyone. Has never been able to drive.    · Household Mgmt: Requires assistance/oversight, dad cooks, does grocery shopping. He thinks he could make simple meals if he had to. Parents handle all the housework.   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 Not getting in trouble at work.       RELEVANT HISTORY:  Prior Testing:  Five prior neuropsychological evaluations since 2008, summaries for two of which are available in the media tab:  · 3/12/15 with Dr Matt ePmberton at [facility]: Low average verbal intelligence, stable from 2014 testing. Language continues to be an area of difficulty. Poor confrontation naming and semantic fluency. Lexical fluency is intact. Receptive language is normal. Visuospatial and processing speed are average. Attention/concentration is severely impaired. Learning is inefficient. Short-term auditory memory is moderately impaired. Delayed auditory recall is mildly to moderately impaired. Short term visual memory and delayed visual recall are average.   · 4/14/14 with Dr Rylie Link: impaired motor speed, verbal memory, attention/concentration, and some aspects of memory, consistent with left frontotemporal dysfunction.     Neurologic History  · Seizures: Endorsed, as above   · Stroke: Denied  · TBI: Denied  · Movement Disorder: Denied  · Falls: Denied  · CNS infection: Denied  · Brain tumor: Denied  · Headache/Migraine: Denied   · Urinary Incontinence:  Denied  · Chronic UTI's:  Denied  · Prior Episode of Delirium:  Denied  · Other neurologic history: Denied  · Referral Diagnosis: R41.3 (ICD-10-CM) - Memory changes    Neurodiagnostics:  Report not available    Pertinent Labs:  No results found for: TRSQLWBL45  No results found for: VITAMINB1  No results found for: RPR  No results found for: FOLATE  No results found for: TSH, L3LEMHB, A9MGAQV, THYROIDAB  Lab Results   Component Value Date    CALCIUM 10.1 02/21/2019      No results found for: LABA1C, HGBA1C  No results found for: HIV1X2, CDD29FMLK  Lab Results   Component Value Date    CREATININE 1.0 02/21/2019    BUN 13 02/21/2019     02/21/2019    K 4.3 02/21/2019     02/21/2019    CO2 26 02/21/2019      No results found for: ALT, AST, GGT, ALKPHOS, BILITOT       Current Outpatient Medications:     cloBAZam (ONFI) 20 mg Tab, Take 1 tablet (20 mg total) by mouth 2 (two) times daily. 1 am and 1 pm, Disp: 180 tablet, Rfl: 4    FLUARIX QUAD 2700-0730, PF, 60 mcg (15 mcg x 4)/0.5 mL Syrg vaccine, , Disp: , Rfl: 0    lacosamide (VIMPAT) 200 mg Tab tablet, Take 1.5 tablets (300 mg total) by mouth every 12 (twelve) hours., Disp: 270 tablet, Rfl: 4    lamoTRIgine (LAMICTAL) 200 MG tablet, Take 3 tablets (600 mg total) by mouth 2 (two) times daily., Disp: 540 tablet, Rfl: 4    LORazepam (ATIVAN) 1 MG tablet, Take 1 tablet (1 mg total) by mouth as needed (seizure)., Disp: 5 tablet, Rfl: 0    multivitamin (THERAGRAN) per tablet, Take 1 tablet by mouth once daily., Disp: , Rfl:     perampaneL (FYCOMPA) 2 mg tablet, Take 3 tablets (6 mg total) by mouth once daily., Disp: 270 tablet, Rfl: 4    Medical history  Patient Active Problem List   Diagnosis    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 with complex partial seizures, intractable, without status epilepticus    Temporal lobectomy behavior syndrome    Environmental and seasonal allergies    Epilepsy    S/P placement of VNS  (vagus nerve stimulation) device    Isolation, social     Past Medical History:   Diagnosis Date    Epilepsy 1981     Past Surgical History:   Procedure Laterality Date    epilepsy surgery Left 2008    VAGUS NERVE STIMULATOR INSERTION Left 2/21/2019    Procedure: INSERTION, VAGUS NERVE STIMULATOR;  Surgeon: Teddy Saldana MD;  Location: [facility] OR [address] [place] [address];  Service: Neurosurgery;  Laterality: Left;  toronto II, asa 2, regular bed, supine,       Substance Use History:  Social History     Tobacco Use    Smoking status: Never Smoker    Smokeless tobacco: Never Used   Substance and Sexual Activity    Alcohol use: No    Drug use: No    Sexual activity: Not on file   · Caffeine               Occasional soda    Family History:  Family History   Problem Relation Age of Onset    Diabetes Father     Cancer Father     Diabetes Sister     Cancer Paternal Aunt     Diabetes Paternal Uncle     Cancer Paternal Grandmother     Diabetes Paternal Grandfather     Cancer Paternal Grandfather      Family Neurologic History: Negative for heritable risk factors   Family Psychiatric History: Negative for heritable risk factors     Developmental/Academic Hx:    Developmental: Born in [place], [place] and raised in [place], [place]. He has three older sister. Product of a normal pregnancy and delivery. No developmental delays. Dad reports everything was normal until he had his first seizure. No cognitive or behavioral issues until resection. English is their only language. No history of abuse.    Academic:  · Learning Difficulties: A's and B's. No history of formal learning disorder diagnosis. Never repeated a grade.  · Attention Difficulties: Denied. Never diagnosed with or treated for ADHD.  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 Used to live on campus at [employer] but [place] he was around 9 or 10.   · Current Relationship/Family Status: Never , no children. He has a girlfriend, Jenelle, they have been dating for about three years. She lives about two hours away, they usually see each other about twice per month, though they talk on the phone frequently.   · Primary Source of Support: Pt feels socially [place] mile. One sister used to live with them, she no longer does. Family is supportive but pt is still frustrated.   · Daily Activities: Works full time. Spends most of his time on the internet. Used to watch TV, but doesn't like shows that are on TV these days.       OBJECTIVE:       MENTAL STATUS AND BEHAVIORAL OBSERVATIONS:  Appearance:  Casually dressed and Well groomed  Behavior:   alert, calm, cooperative, rapport easily established and Perceived test performance as poor.   Orientation:   Fully oriented  Sensory:   Pt wore eyeglasses.  Gait:   Pt ambulates independently.   Psychomotor:  BUE tremor  Speech:  Generally fluent and spontaneous. Verbose and tangential. Occasional stuttering 2/2 word finding. Normal volume, rate, pitch, tone, and prosody.  Language:  Evidence of mild word-finding difficulties in conversational speech., No paraphasic errors. Frequently requested psychometrist repeat herself or speak more slowly.   Mood:   Anxious, easily frustrated   Affect:   mood-congruent. Quite emotive throughout testing.   Thought Process: scattered and tangential  Thought Content: Denied current SI/HI. and No evidence of psychotic symptoms.  Memory:  Recent and remote appear grossly intact  Attn/Concentration:  Variable  Fund of Knowledge: Broadly WNL  Judgment/Insight: Limited deficit awareness  Validity/Test Taking Bx: Performance on standalone and embedded performance validity  "measures suggested variable test engagement. As a result, some results may underestimate the pt's actual abilities. The pt was easily upset by perceived poor performance. Pt was easily discouraged, exhibited negative self-talk, and required frequent reassurance and encouragement. Mr. Robe Lowry arrived to testing with both of his parents. He wore glasses which he reported for both reading and distance. He reported no hearing problems, but would state something he hadn't heard. For example, after TOPF instructions he stated, "I thought you said remember these words." He requried extra clarification and reassurance on all directions. He reiterated directions back to the psychometrist before beginning any tests to ensure he understood it. He requested the psychometrist to speak slower. He expressed that he wants friends. During TOPF, he became upset, he stated, "it hurts... emotionally." He worked methodically on Block Design items. On CVLT he often repeated the list aloud and to himself several times over. When asked if he had anything else, he expresseed that he was still thinking. He took about 3 to 4 minutes per trial, which typically takes about one minute.     PROCEDURES/TESTS ADMINISTERED:  In addition to performing a review of pertinent medical records, reviewing limits to confidentiality, conducting a clinical interview, and explaining procedures, the following measures were administered:   Green's MSVT, TOMM, Test of Premorbid Functioning (TOPF), Wechsler Adult Intelligence Scale - Fourth Edition (WAIS-IV), selected subtests, Ruff 2&7 Selective Attention Test , Neuropsychological Assessment Battery (NAB) Naming subtest, Controlled Oral Word Association Test (FAS/Zulma et al., 2004), Animal Naming (Zulma et al., 2004), Trail Making Test (Zulma et al., 2004), Stroop Color Word Test, Miguel Angel Complex Figure Test (Copy Trial) , [place] Verbal Learning Test - Second Edition (CVLT-II), Standard Form, Wechsler " Memory Scale - Fourth Edition (WMS-IV), selected subtests, Moy Depression Inventory - Second Edition (BDI-II) and Symbol Digit Modalities Test (SDMT) oral and written versions. Manual norms were used unless otherwise indicated.     NEUROPSYCHOLOGICAL ASSESSMENT RESULTS:  The following should not be interpreted in isolation from the neuropsychological evaluation report.  Scores on stand-alone and embedded performance validity measures were variable.      Raw Score Type of Standardized Score Standardized Score Percentile/CP Descriptor   MSVT IR 85 - - - -   MSVT DR 95 - - - -   MSVT Cons 80 - - - -   TOMM 1 38 - - - -   TOMM 2 47 - - - -   TOMM R 46 - - - -   ACS LM II Rec 12 - - - -   ACS VR II Rec 7 - - - -   ACS RDS 7 - - - -   CVLT-II FC 94 - - - -   PREMORBID FUNCTIONING Raw Score Type of Standardized Score Standardized Score Percentile/CP Descriptor   TOPF simple dem. eFSIQ -  58 Average   TOPF pred. eFSIQ - SS 81 10 Low Average   TOPF simple + pred. eFSIQ - SS 90 25 Average   LANGUAGE FUNCTIONING Raw Score Type of Standardized Score Standardized Score Percentile/CP Descriptor   WAIS-IV Similarities 22 ss 8 25 Average   TOPF Word Reading 22 SS 80 9 Low Average   NAB Naming 16 Tscore 19 <0.1 Exceptionally Low    FAS 33 Tscore 37 9 Low Average   Animal Naming 10 Tscore 22 0.3 Exceptionally Low    VISUOSPATIAL FUNCTIONING Raw Score Type of Standardized Score Standardized Score Percentile/CP Descriptor   WAIS-IV MECHE -  50 Average   WAIS-IV Block Design 42 ss 11 63 Average   WAIS-IV Matrix Reasoning 15 ss 9 37 Average   RCFT Copy 34 - - >16 WNL   RCFT Time to Copy 245 - - >16 WNL   LEARNING & MEMORY Raw Score Type of Standardized Score Standardized Score Percentile/CP Descriptor   CVLT-II         Trials 1-5 (T-Score) 21 Tscore 27 1 Exceptionally Low    List A Trial 1 1 zscore -4 <1 Exceptionally Low   List A Trial 5 6 zscore -2 2 Below Average   List B 3 zscore -1.5 2 Below Average   SDFR 3 zscore -2 2  Below Average   SDCR 4 zscore -2 2 Below Average   LDFR 3 zscore -1.5 2 Below Average   LDCR 4 zscore -2 2 Below Average   Semantic Clustering -0.3 zscore -1 16 Low Average   Learning [place] 1.1 zscore 0 50 Average   Repetitions 19 zscore 3 99.9 Exceptionally High   Intrusions 16 zscore 2.5 99.9 Exceptionally High   Recognition Hits 8 zscore -3.5 <1 Exceptionally Low   False Positives 8 zscore 1.5 98 Above Average   Discriminability 0.7 zscore -2.5 0.1 Exceptionally Low    WMS-IV         LM I 7 ss 1 0.1 Exceptionally Low    LM II 5 ss 3 1 Exceptionally Low    LM Recognition 12 - - <2 Exceptionally Low   VR I 35 ss 10 50 Average   VR II 25 ss 10 50 Average   VR II Recognition 7 - - >75 High Average   VR Copy 43 - - >75 WNL    ATTENTION/WORKING MEMORY Raw Score Type of Standardized Score Standardized Score Percentile/CP Descriptor   WAIS-IV Digit Span 22 ss 7 16 Low Average         DS Forward 6 ss 5 5 Below Average         DS Backward 7 ss 8 25 Average         DS Sequence 9 ss 11 63 Average         Longest Forward 4 - - - -         Longest Backward 4 - - - -         Longest Sequence 6 - - - -   Ruff 2&7 Total Speed 102 Tscore 53 62 Average   Ruff 2&7 Total Accuracy 112 Tscore 56 73 Average   Ruff 2&7 Total Difference  -3  NS - -   MENTAL PROCESSING SPEED Raw Score Type of Standardized Score Standardized Score Percentile/CP Descriptor   SMDT Written 55 zscore 0.32 50 Average   SMDT Oral 59 zscore 0.08 50 Average   RBANS Coding N/A Tscore N/A N/A N/A   TMT A  19 Tscore 63 90 High Average   TMT A errors 0 - - - -   Stroop: Word Reading 100 Tscore 42 21 Low Average   Stroop: Color Naming 64 Tscore 36 8 Below Average   EXECUTIVE FUNCTIONING Raw Score Type of Standardized Score Standardized Score Percentile/CP Descriptor   TMT B 76 Tscore 45 31 Average   TMT B errors 1 - - - -   Stroop: C/W 36 Tscore 40 16 Low Average   Stroop: Interference -2 Tscore 48 42 Average   WAIS-IV Similarities 22 ss 8 25 Average   WAIS-IV Matrix  Reasoning 15 ss 9 37 Average   MOOD & PERSONALITY Raw Score Type of Standardized Score Standardized Score Percentile/CP Descriptor   BDI-2 8 - - - Minimal   ss = scaled score (mean = 10, SD = 3); SS = standard score (mean = 100, SD = 15); Tscore mean = 50, SD = 10; zscore (mean = 0.00, SD = 1)           Billing/Services Summary          Neurobehavioral Status Exam Base Code (58944)  Total Units: 0    Face-to-face Total Time: 0 min.         Professional Neuropsychological Testing Evaluation Services Base Code (06737)   Total Units: 1 Add-on (29257)  Total Units: 2   Referral review/initial test selection 15 min.    Intra-session Clinical Decision-Making          Tech consult/test review/modification 0 min.         Patient behavior management 0 min.    Face-to-face Interpretive Feedback 60 min.    Record Review/Integration/Report Generation 120 min.     Total Time: 195 min.         Test Administration by Psychologist Base Code (32332)   Total Units: 0 Add-on (31651)  Total Units: 0   Testing 0 min.    Scoring 0 min.     Total Time: 0 min.         Test Administration by Technician  Technician Name: Maggie Nesbitt Base Code (34859)   Total Units: 1 Add-on (62683)\  Total Units: 10   Face-to-Face Testin min.    Scoring 95 min.     Total Time: 331 min.    DOS is the date of the evaluation unless specified

## 2022-03-16 ENCOUNTER — OFFICE VISIT (OUTPATIENT)
Dept: NEUROLOGY | Facility: CLINIC | Age: 47
End: 2022-03-16
Payer: COMMERCIAL

## 2022-03-16 DIAGNOSIS — G40.219 LOCALIZATION-RELATED SYMPTOMATIC EPILEPSY AND EPILEPTIC SYNDROMES WITH COMPLEX PARTIAL SEIZURES, INTRACTABLE, WITHOUT STATUS EPILEPTICUS: Primary | ICD-10-CM

## 2022-03-16 DIAGNOSIS — F07.0 TEMPORAL LOBECTOMY BEHAVIOR SYNDROME: ICD-10-CM

## 2022-03-16 DIAGNOSIS — Z96.89 S/P PLACEMENT OF VNS (VAGUS NERVE STIMULATION) DEVICE: ICD-10-CM

## 2022-03-16 PROCEDURE — 99499 NO LOS: ICD-10-PCS | Mod: 95,,, | Performed by: PSYCHIATRY & NEUROLOGY

## 2022-03-16 PROCEDURE — 99499 UNLISTED E&M SERVICE: CPT | Mod: 95,,, | Performed by: PSYCHIATRY & NEUROLOGY

## 2022-03-16 NOTE — PROGRESS NOTES
NEUROPSYCHOLOGICAL EVALUATION FEEDBACK    Nicholas Nagel Jr. attended a feedback session today accompanied by his parents.  We discussed the results of the neuropsychological evaluation (35 minutes).  Handouts provided in AVS. All of his questions were answered.    1. 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 with complex partial seizures, intractable, without status epilepticus     2. Temporal lobectomy behavior syndrome     3. S/P placement of VNS (vagus nerve stimulation) device         PLAN:   1. Interested in psychotherapy, family also interested in participating sometimes. Scheduling phone number provided    Debra Sanchez PsyD  Licensed Clinical Neuropsychologist  [facility] - Department of Neurology

## 2022-03-16 NOTE — PATIENT INSTRUCTIONS
I have placed a referral for [facility]. You can receive either psychotherapy, medications, or both, depending on your preferences.     PLEASE CALL [phone] TO SCHEDULE WITH BEHAVIORAL HEALTH      Summary of the evidence on modifiable risk factors for cognitive decline and dementia             From: Anai Bagley Carrillo, Fazio, Kim & Dee (2015). Summary of the evidence on modifiable risk factors for cognitive decline and dementia: A population-based perspective. Alzheimer's & Dementia, 11, 718-937.    *Studies suggest small or moderate alcohol consumption (no more than 1 drink per day) by older individuals may decrease the risk of cognitive decline and dementia. The evidence is not strong enough, however, to suggest those who do not drink should start drinking, especially when weighed against the potential negative effects of excessive alcohol consumption, such as an increased risk of falls among older adults. Research also generally suggests that red wine may be the best form of alcohol for cognitive functioning, in part, due to its antioxidant effects. All alcohol use is cautioned, though, as alcohol could cause harmful effects and interfere with medications. A physician and/or pharmacist should be consulted before alcohol is consumed.          Behaviors to Promote Brain Health       Exercise regularly - Exercise has many known benefits, and it appears that regular physical activity benefits the brain. Multiple research studies show that people who are physically active are less likely to experience a decline in their mental function and have a lower risk of developing Alzheimer's disease. We believe these benefits are a result of increased blood flow to your brain during exercise. It also tends to counter some of the natural reduction in brain connections that occur during aging, in effect reversing some of the problems. Aim to exercise several times per  week for 30-60 minutes. You can walk, swim, play tennis or any other moderate aerobic activity that increases your heart rate.    Eat a Mediterranean diet - Your diet plays a large role in your brain health. Consider following a Mediterranean diet, which emphasizes plant-based foods, whole grains, fish and healthy fats, such as olive oil. It incorporates much less red meat and salt than a typical American diet. Studies show people who closely follow a Mediterranean diet are less likely to have Alzheimer's disease than people who don't follow the diet. Omega fatty acids found in extra-virgin olive oil and other healthy fats are vital for your cells to function correctly, appear to decrease your risk of coronary artery disease, and increase mental focus and slow cognitive decline in older adults.       Stay mentally active - Your brain is similar to a muscle -- you need to use it or you lose it. There are many things that you can do to keep your brain in shape, such as doing crossword puzzles or Sudoku, reading, playing cards or putting together a jigsaw puzzle. Consider it cross-training your brain. So incorporate different activities to increase the effectiveness.     Stay socially involved - Social interaction helps [place] off depression and stress, both of which can contribute to memory loss. Look for opportunities to connect with loved ones, friends and others, especially if you live alone. There is research that links solitary confinement to brain atrophy, so remaining socially active may have the opposite effect and strengthen the health of your brain.    Get plenty of sleep - Sleep plays an important role in your brain health. There are some theories that sleep helps clear abnormal proteins in your brain and consolidates memories, which boosts your overall memory and brain health. It is important that you try to get seven to eight consecutive hours of sleep per night, not fragmented sleep of two- or three-hour  increments. Consecutive sleep gives your brain the time to consolidate and store your memories effectively.

## 2022-03-29 ENCOUNTER — TELEPHONE (OUTPATIENT)
Dept: NEUROLOGY | Facility: CLINIC | Age: 47
End: 2022-03-29
Payer: COMMERCIAL

## 2022-03-29 NOTE — TELEPHONE ENCOUNTER
----- Message from Rohini Castro sent at 3/29/2022  8:49 AM CDT -----  Contact: self @ [phone]  Pt says his seizures have increased.  He is calling to see if he needs to an appt to see Dr Roberts or if his VNS needs to be adjusted.  Pls call.

## 2022-03-30 NOTE — TELEPHONE ENCOUNTER
----- Message from Cristofer Brambila sent at 3/30/2022 10:32 AM CDT -----  Contact: Pt  Pt requesting a call back from [place]. Pt stats that he has seen by  and seizures has gotten worse. Pt would like staff to give him a call back..         [phone]

## 2022-04-07 ENCOUNTER — LAB VISIT (OUTPATIENT)
Dept: LAB | Facility: HOSPITAL | Age: 47
End: 2022-04-07
Payer: COMMERCIAL

## 2022-04-07 ENCOUNTER — OFFICE VISIT (OUTPATIENT)
Dept: NEUROLOGY | Facility: CLINIC | Age: 47
End: 2022-04-07
Payer: COMMERCIAL

## 2022-04-07 VITALS
SYSTOLIC BLOOD PRESSURE: 129 MMHG | WEIGHT: 204.25 LBS | HEIGHT: 71 IN | BODY MASS INDEX: 28.6 KG/M2 | HEART RATE: 80 BPM | DIASTOLIC BLOOD PRESSURE: 80 MMHG

## 2022-04-07 DIAGNOSIS — G40.219 LOCALIZATION-RELATED SYMPTOMATIC EPILEPSY AND EPILEPTIC SYNDROMES WITH COMPLEX PARTIAL SEIZURES, INTRACTABLE, WITHOUT STATUS EPILEPTICUS: ICD-10-CM

## 2022-04-07 DIAGNOSIS — G40.219 LOCALIZATION-RELATED SYMPTOMATIC EPILEPSY AND EPILEPTIC SYNDROMES WITH COMPLEX PARTIAL SEIZURES, INTRACTABLE, WITHOUT STATUS EPILEPTICUS: Primary | ICD-10-CM

## 2022-04-07 PROCEDURE — 99417 PROLNG OP E/M EACH 15 MIN: CPT | Mod: S$GLB,,,

## 2022-04-07 PROCEDURE — 3074F SYST BP LT 130 MM HG: CPT | Mod: CPTII,S$GLB,,

## 2022-04-07 PROCEDURE — 80175 DRUG SCREEN QUAN LAMOTRIGINE: CPT

## 2022-04-07 PROCEDURE — 3079F PR MOST RECENT DIASTOLIC BLOOD PRESSURE 80-89 MM HG: ICD-10-PCS | Mod: CPTII,S$GLB,,

## 2022-04-07 PROCEDURE — 80235 DRUG ASSAY LACOSAMIDE: CPT

## 2022-04-07 PROCEDURE — 3008F BODY MASS INDEX DOCD: CPT | Mod: CPTII,S$GLB,,

## 2022-04-07 PROCEDURE — 1159F PR MEDICATION LIST DOCUMENTED IN MEDICAL RECORD: ICD-10-PCS | Mod: CPTII,S$GLB,,

## 2022-04-07 PROCEDURE — 3074F PR MOST RECENT SYSTOLIC BLOOD PRESSURE < 130 MM HG: ICD-10-PCS | Mod: CPTII,S$GLB,,

## 2022-04-07 PROCEDURE — 99999 PR PBB SHADOW E&M-EST. PATIENT-LVL III: ICD-10-PCS | Mod: PBBFAC,,,

## 2022-04-07 PROCEDURE — 99999 PR PBB SHADOW E&M-EST. PATIENT-LVL III: CPT | Mod: PBBFAC,,,

## 2022-04-07 PROCEDURE — 99215 PR OFFICE/OUTPT VISIT, EST, LEVL V, 40-54 MIN: ICD-10-PCS | Mod: S$GLB,,,

## 2022-04-07 PROCEDURE — 99417 PR PROLONGED SVC, OUTPT, W/WO DIRECT PT CONTACT,  EA ADDTL 15 MIN: ICD-10-PCS | Mod: S$GLB,,,

## 2022-04-07 PROCEDURE — 99215 OFFICE O/P EST HI 40 MIN: CPT | Mod: S$GLB,,,

## 2022-04-07 PROCEDURE — 3079F DIAST BP 80-89 MM HG: CPT | Mod: CPTII,S$GLB,,

## 2022-04-07 PROCEDURE — 80339 ANTIEPILEPTICS NOS 1-3: CPT

## 2022-04-07 PROCEDURE — 1159F MED LIST DOCD IN RCRD: CPT | Mod: CPTII,S$GLB,,

## 2022-04-07 PROCEDURE — 3008F PR BODY MASS INDEX (BMI) DOCUMENTED: ICD-10-PCS | Mod: CPTII,S$GLB,,

## 2022-04-07 RX ORDER — LORAZEPAM 1 MG/1
1 TABLET ORAL
Qty: 5 TABLET | Refills: 0 | Status: SHIPPED | OUTPATIENT
Start: 2022-04-07

## 2022-04-07 NOTE — PROGRESS NOTES
Interval Events/ROS 4/7/2022:  - Presents with parents who also contribute to the history  - Patient is a 45 y/o man with a hx of focal epilepsy s/p left temporal resection (2008) and a VNS (2019)  - Current seizure frequency is around one focal event with preserved awareness about every 10-14 days though reports he did have a seizure w/ impaired awareness last week that occurred at work, unsure how long it lasted though believes it lasted longer than his other events   - No missed doses of medication, no other identifiable trigger of this larger event although he mentions he did have a sinus infection at the time   - He is on a current medication regimen of lacosamide 200mg BID, clobazam 20mg BID, perampanel 6mg daily, and lamotrigine 600mg BID with no reported side effects  - Tolerating current VNS settings well  - Has questions and would like further clarification regarding neurocognitive testing results   - Otherwise, no fever, no cold symptoms, no headache, no changes in vision, no new weakness, no chest pain, no shortness of breath, no nausea, no vomiting, no diarrhea, no constipation, no tingling/numbness, no problems walking    Prior note:   Presents with father   11/18/21 (not clear), 11/27/21   Dec 3 and 9 - event of 10-15 sec dizziness   Episodes of easily irritability at work   Pt is a poor historian     Prior note:   Sz log:   Oct 29 - GTC   Oct 30 - GTC (despite taking ativan 2 hrs before) trigger - anxiety, nervous   Today had a bout of dizziness and imbalance - this is a rare occurrence about 1-2 hrs after taking meds      Pt of Dr. Chan  Dizziness much improved    Last sz - 9/22/21 -   Semiology re-surgery - several thoughts race through mind, limb jerking    Semiology after surgery - distracted by thoughts, less jerking      Seizure log since last visit: none  - last sz: 1/19/2021  - possible sz 4/28 ?unsure   - mostly dizziness is the issue     Current AEDs:   -  mg bid  (200mg 1 1/2 tabs  bid)   - PER 6 mg q day (2mg 3 tabs q AM)   - CLOB 40 mg bid (20mg 2 tabs bid) - suggested increase at last visit 50mg bid - but patient decreased to 30mg bid due to dizziness  PCP decreased 20mg-30mg - per pt and his father, the change has decreased the dizziness over the past week  - LTG 600mg bid (200mg 3 tabs bid)  - VNS      Results for GEORGETTE MONCADA JRShahnaz (MRN [id number]) as of 5/31/2021 08:29    Ref. Range 4/24/2019 14:03 5/6/2020 11:44 7/31/2020 10:06 5/14/2021 11:55   Clobazam Latest Ref Range: 30 - 300 ng/mL 916.0 (H) 849.0 (H) 805.0 (H) 859.0 (H)   Lacosamide Latest Ref Range: 1.0 - 10.0 mcg/mL 7.5 10.2 (H) 10.5 (H) 11.1 (H)   Lamotrigine Lvl Latest Ref Range: 2.0 - 15.0 ug/mL 13.2 16.1 (H) 14.5 14.1   Perampanel (Fycompa) Quant Latest Units: ng/mL       140   Desmethylclobazam Latest Ref Range: 300 - 3000 ng/mL 6180.0 (H) 7820.0 (H) 6160.0 (H) 5810.0 (H)         Notes from last clinic visit, 3/11/2021:  Seizure log since last visit:  1/19  12/2  10/12      Results for GEORGETTE MONCADAJIM WADE (MRN [id number]) as of 3/11/2021 11:29    Ref. Range 7/13/2017 12:55 4/24/2019 14:03 5/6/2020 11:44 7/31/2020 10:06   Lacosamide Latest Ref Range: 1.0 - 10.0 mcg/mL 6.8 7.5 10.2 (H) 10.5 (H)   Lamotrigine Lvl Latest Ref Range: 2.0 - 15.0 ug/mL 12.6 13.2 16.1 (H) 14.5   Perampanel (Fycompa) Quant Latest Units: ng/mL 160         Clobazam Latest Ref Range: 30 - 300 ng/mL 894 (H) 916.0 (H) 849.0 (H) 805.0 (H)   Desmethylclobazam Latest Ref Range: 300 - 3000 ng/mL 5904 (H) 6180.0 (H) 7820.0 (H) 6160.0 (H)      Current AEDs:   - LCS 300mg bid  (200mg 1 1/2 tabs bid)  - PER 6 mg q day (2mg 3 tabs q AM)   - CLOB 40 mg bid (20mg 2 tabs bid)  - LTG 600mg bid (200mg 3 tabs bid)  - VNS   Model # AspireSR [id number]  Implant Date: 2/21/2019  Is Device palpable in chest wall                     Yes  Side of implant                                                L                                                        Initial               Reprogram   Output current             mA      0.675               0.675  Signal Freq                 Hz        30                    30  Pulse width                 uSec    750                  1000    Signal on time             Sec      30                    30  Signal off time             Min      10.0                 10.0    Autostimulation (AS)  Output Current            mA       0.75                 0.75  Pulse width                 uSec    750                  1000  Signal on time             Sec      60    Magnet settings  Output current             mA       0.75                 0.75  Pulse width                 uSec    750                  1000  Signal on time             Sec      60                    60     Tachycardia detect                 On/Off              On/off  Threshhold for AS       %                                         Seizure hx:   - onset at age 8 years     - RUE and head jerking movements -> GTC sz   - no hx of tongue/cheek bite or bowel incontinence; infrequently bladder incontinence  - epilepsy surgery done at [facility], [place] in 2008 - possibly less frequent per father; patient feels that he is more aware of it coming on and is related to triggers  - VNS placed in 2019 - did not make any difference per father     Notes from last clinic visit with , 8/12/2020:  Patient may have had a seizure on Jun 30.  He had a typical warning, swiped the VNS and did not lose awareness.  That is the only symptoms he experienced.  He is not experiencing any AEs from the meds. He does not have any other medical issues.    2020/05/14  He has noticed intermittent dizziness particularily with change in posture.  Dad fell his comprehension is not as good.  He reports two seizures - 5 days ago after missed AM dose.  The second occurred at work when he went to see his supervisor. 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  Starting 2-3 weeks after last visit he began to have the intermittyent dizziness.  Lamictal level is high enough that there may be a pharmacodynamic interaction with lacosamide.  2019/12/12  Since the last visit he had only two seizures and he retained awareness with both.  He has been taking ativan when he goes to football games.  Lamictal levels have been stable and the clearance calculated from the dose/level indicates he is a rapid metabolizer.    2019/08/29  Patient reports three sz since last visit.  All have been partial with no convulsions.  He is tolerating the VNS well.  He has not been using the magnet to block seizures..  He is tolerating the medication well.  In the past he would usually have a seizure when he went to a game.  I have given him 1 mg ativan to take 1 hrs before a game and  This has worked well in preventing seizures.    2019/04/24  Doing well since last visit, no reported seizures. Tolerated initial VNS settings well. Returns today for continued VNS adjustment. Medication regimen unchanged.   2019/03/27  VNS implanted 2/21/19, tolerated procedure well. He was seen by NSGY in a virtual visit 3/7/19 for his 2 week post-op appointment, incision clean/dry/intact and well approximated at that time, and on exam today. Patient/family report no recent seizures. Continues to take Lacosamide, Clobazam, Lamotrigine, and Perampanel as prescribed. Presents today for first VNS adjustment.   2018/09/13  Patient reports 3 mild CPS since the last visit.  Last visit I gave him a schedule to increase lacosamide in two steps to 200 mg 1½ BID.  When he reached this dose he became dizzy and reduced the morning dose to 1 tab with resolution of the dizzinesss.  Last visit I discussed VNS treatment and today the patient wants to proceed with implanting the device.  He has no other medical problems except allergies.  He does take loratadine which works on the H1 reception similar to Benadryl but  has not been been implicated in lowering seizure threshhold.    2018/01/26  The patient reports 3 sz since the last visit.  The most recent may have resulted from missed dose.  No triggers evident for the other 2 sz.  The patient reports he has an aura but can not describe what he perceives.    2017/07/13  In the past 6 months the patient has experienced five seizures (about 1 per month).   The patient was having difficulty expressing himself - speech is halting and disconnected.  He has been taking ativan prior to social event or watching sports to prevent seizure.  He has been taking ativan about 2 times a month.    2016/04/19 and 2017/01/23              Since the last visit the patient has experienced one seizures (12 Jan 2017) .  Meds were increased last time and he is tolerating the new dosages well.  There was no obvious provocative reason.  He has a good strategy for compliance.  He had no concurrent medical problem and had not taken benadryl or other proconvulsive medication.    pt has had the following episodes:  2015/4/8 - Pt had just finished a walk.  Eye blinking and stereotypic hand movements.  No generalized convulsions were noted. Didn't lose consciousness.  Lasted 30s to 1 minute.  2015/6/6 - Pt was in his room.  Came to his dad to give him his phone to record what was happening.  Said he has the feeling that he's about to have a seizure.  Says there is no aura per se.  Says a thought that he's about to have a seizure pops into his head, which usually precedes his events.  Said his speech got markedly worse during the event.  Eye blinking lasted 30s to 1 minute and resolved spontaneously.  Also had stereotypic hand movements and eye blinking.  Maintained consciousness throughout episode.  Pt had no memory of the seizure, which was relayed by his dad.     2015/6/8 - Pt was running around a track.  Said he overexerted himself over the last three laps.  Said he had another event immediately after  exercise.  Again, this episode was brief (30s-1min) and was characterized by walking in circles, eye blinking, and stereotypic hand motions of rubbing his fingers. Had no intraictal recall.       Previous History (2015/4/1)              The patient was referred for consultation by Dr. Guerrero due to want to transfer care here due to long distance, patient is living [facility].  The patient was accompanied by his mother and father who provided some of the history.       Seizure surgery 2008 for epilepsy. Since then, less frequent and not as strong, still have seizure twice a month with current medication dosage. He is compliant with medication, tolerates medication well, no side effect noticed. On current regime for about 5 years, change to onfi 2-3 years ago in place of keppra.         Seizure Seminology  Seizure Type 1  Classification:   Aura - feel it is coming, his mind told him that seizure is coming  Ictus  - Nonconv -  - Conv - whole body jerking more on the right side, after a couple of jerk, he is out, first started has tongue biting and incontinence  - Duration - 2 minutes  Post-ictal  - Symptoms: can not talk, sleepiness no headache  - Duration: 5-10 minutes  Age of onset : 6 year old  Current Seizure Frequency -  Initially once week,  Last Seizure: 10 years ago     Seizure Type 2: after seizure  Classification:   Aura - feel it is coming, but not as strong as before  Ictus  - Nonconv -  - Conv - blinking and body jerk more on the arm.   - Duration - 30 sec  Post-ictal  - Symptoms: no sleepiness  - Duration: back to baseline after ictus right away  Age of onset : 29  Current Seizure Frequency -  Last Seizure: a couple weeks ago  Triggers: stress, missed meds, computer use, overexertion     Any other relevant information:  - none     PREVIOUS EVALUATIONS:    Previous EEGs:   - per 's notes, C-EEG in 2014: 'not abnormal'     Previous MRIs:  - per 's notes, MRI in 2015: 'no new  changes'     Additional tests:  1)CT Scan:   2) EEG\Video Monitoring: no  3) PET Scan: no  4) Neuropsychological evaluation: no  5) DEXA Scan: no  6) Others: no     RISK FACTORS FOR SEIZURES:    1. Head Trauma:  No    2. CNS Infections:  No  3. CNS Tumors: No     4. CNS Vascular Disease: No     5. Febrile Seizures: No    6. Developmental Delay: No       7. Family History of Seizures: No    8. Birth history: FTNVD     Pregnancy/Labor/Delivery: n/a     CURRENT MEDICATIONS:   Current Medications               Current Outpatient Medications   Medication Sig Dispense Refill    cloBAZam (ONFI) 20 mg Tab TAKE TWO TABLETS BY MOUTH TWICE DAILY. GENERIC EQUIVALENT TO ONFI. 360 tablet 2    FLUARIX QUAD 6432-1002, PF, 60 mcg (15 mcg x 4)/0.5 mL Syrg vaccine     0    lacosamide (VIMPAT) 200 mg Tab tablet Take 1.5 tablets (300 mg total) by mouth every 12 (twelve) hours. 270 tablet 2    lamoTRIgine (LAMICTAL) 200 MG tablet Take 3 tablets (600 mg total) by mouth 2 (two) times daily. 540 tablet 2    LORazepam (ATIVAN) 1 MG tablet Take 1 tablet (1 mg total) by mouth as needed (seizure). 5 tablet 0    multivitamin (THERAGRAN) per tablet Take 1 tablet by mouth once daily.        perampaneL (FYCOMPA) 2 mg tablet Take 3 tablets (6 mg total) by mouth once daily. 270 tablet 2      No current facility-administered medications for this visit.         Folic acid: no     CURRENT ANTI EPILEPTIC MEDICATIONS:  See hpi     VAGAL NERVE STIMULATOR: see hpi     PRIOR ANTICONVULSANT HISTORY:   carbamazepine (Tegretol, CBZ):        tried in the past  ethosuximide (Zarontin, ESM):           tried in the past  felbamate (felbatol, FBM):                  tried in the past  gabapentin (Neurontin, GPN):            tried in the past  levetiracetam (Keppra, LEV):             tried in the past  phenytoin (Dilantin, PHT):                  tried in the past  primidone (Mysoline, PRM):               tried in the past  topiramate (Topamax, TPM):              tried in the past  valproic acid (Depakote, VPA):          tried in the past   clorazepate (Tranxene, CLZ):            Stopped previous visits.       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    Environmental and seasonal allergies 09/13/2018    Epilepsy 02/21/2019    S/P placement of VNS (vagus nerve stimulation) device 03/27/2019              Resolved Ambulatory Problems     Diagnosis Date Noted    No Resolved Ambulatory Problems      No Additional Past Medical History      PAST PSYCHIATRIC HISTORY:  no     PAST SURGICAL HISTORY including Epilepsy surgery:             Past Surgical History:   Procedure Laterality Date    epilepsy surgery Left 2008    VAGUS NERVE STIMULATOR INSERTION Left 2/21/2019     Procedure: INSERTION, VAGUS NERVE STIMULATOR;  Surgeon: Teddy Saldana MD;  Location: [facility] OR [address] [place] [address];  Service: Neurosurgery;  Laterality: Left;  toronto II, asa 2, regular bed, supine,      FAMILY HISTORY:             Family History   Problem Relation Age of Onset    Diabetes Father      Cancer Father      Diabetes Sister      Cancer Paternal Aunt      Diabetes Paternal Uncle      Cancer Paternal Grandmother      Diabetes Paternal Grandfather      Cancer Paternal Grandfather         SOCIAL HISTORY:               Social History                   Socioeconomic History    Marital status: Single       Spouse name: Not on file    Number of children: Not on file    Years of education: Not on file    Highest education level: Not on file   Occupational History    Not on file   Tobacco Use    Smoking status: Never Smoker   Substance and Sexual Activity    Alcohol use: No    Drug use: No    Sexual activity: Not on file   Other Topics Concern    Not  on file   Social History Narrative    Not on file      Social Determinants of Health            Financial Resource Strain:     Difficulty of Paying Living Expenses:    Food Insecurity:     Worried About Running Out of Food in the Last Year:     Ran Out of Food in the Last Year:    Transportation Needs:     Lack of Transportation (Medical):     Lack of Transportation (Non-Medical):    Physical Activity:     Days of Exercise per Week:     Minutes of Exercise per Session:    Stress:     Feeling of Stress :    Social Connections:     Frequency of Communication with Friends and Family:     Frequency of Social Gatherings with Friends and Family:     Attends Shinto Services:     Active Member of Clubs or Organizations:     Attends Club or Organization Meetings:     Marital Status:             a) Marital status: Single                                                    b) Living situation: patient lives with parents  c) Employed/Unemployed/Other: Employed full time  - 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      REVIEW OF SYSTEMS:  Review of Systems     PHYSICAL EXAM:  - General: Awake, cooperative, NAD.  - HEENT: NC/AT  - Neck: Supple  - Pulmonary: no increased WOB  - Cardiac: well perfused   - Abdomen: soft, nontender, nondistended  - Extremities: no edema  - Skin: no rashes or lesions noted     NEURO EXAM:   - Mental Status: Awake, alert, oriented x 3. Attentive to examiner. Language is hesitant with intact repetition and comprehension. Normal prosody. There were no paraphasic errors. Able to name both high and low frequency objects. Speech was not dysarthric. Able to follow both midline and appendicular commands. There was no evidence of apraxia or neglect.     - Cranial Nerves:   EOMI. No facial droop. Hearing intact to room  voice. 5/5 strength in trapezii. Tongue protrudes in midline and to either side with no evidence of atrophy or weakness.     - Motor: Normal bulk and tone throughout. No pronator drift bilaterally. No adventitious movements such as tremor or asterixis noted.   - Sensory: No deficits to light touch.   - Coordination: No dysmetria on FNF  - Gait: Good initiation. Narrow-based, normal stride and arm swing.       IMPRESSION:   Focal epilepsy, onset at age 8 years  intractable s/p epi surgery at [facility] in [place] (left temporal resection, 2008) and VNS (2019)  Speech fluency impaired   Episodes of brief dizziness - forced displacement? He gets irritable after the event   Feels irritable when overstimulated by people around him are talking      Plan:   1, Current AEDs:   -  mg BID  - PER 6 mg q day (2mg 3 tabs q AM)   - CLOB 20 mg AM, 20 mg PM (dizziness at higher doses)   - LTG 600mg bid (200mg 3 tabs bid)    - VNS settings increased and summarized below, advised patient to reach out over the portal with any issues   - levels today, will make medication adjustments as indicated   - Follow up in 4 months or sooner with issues   - Follow up with Dr. Sanchez regarding questions about neurocognitive testing        S/P placement of VNS (vagus nerve stimulation) device  VNS was interrogated today (4/7/2022) and the following changes were made:     Model # AspireSR [id number]  Implant Date: 2/21/2019  Is Device palpable in chest wall                     Yes  Side of implant                                                L                                                             Prior settings:  Output current             mA       0.675               Signal Freq                 Hz        30                      Pulse width                 uSec    500               Signal on time             Sec      30                     Signal off time             Min      5.0                   Duty cycle 10%    Autostimulation  (AS)  Output Current            mA       0.75                  Pulse width                 uSec    500                   Signal on time             Sec      30    Magnet settings  Output current             mA       0.75                  Pulse width                 uSec    500                    Signal on time             Sec      60                       Tachycardia detect     On/Off                Threshhold for AS       %                                      Lead impedence 3289 Ohms  Generator battery: 50-75 %     New settings:                                                         Output current             mA       0.75               Signal Freq                 Hz        30                      Pulse width                 uSec    500               Signal on time             Sec      60                     Signal off time             Min      5.0                   Duty cycle 18%    Autostimulation (AS)  Output Current            mA       0.875                  Pulse width                 uSec    500                   Signal on time             Sec      30    Magnet settings  Output current             mA       1.0                  Pulse width                 uSec    500                    Signal on time             Sec      60                       Tachycardia detect     On/Off                Threshhold for AS       %                                      Generator battery: 50-75%    Ashely Peoples PA-C   Neurology-Epilepsy  [facility]    Collaborating physician, Dr. Harvey Roberts, was available during today's encounter.     I spent approximately 75 minutes on the day of this encounter preparing to see the patient, obtaining and reviewing history and results, performing a medically appropriate exam, counseling and educating the patient/family/caregiver, documenting clinical information, coordinating care, and ordering medications, tests, procedures, and referrals.

## 2022-04-07 NOTE — PATIENT INSTRUCTIONS
You came to [facility] because of your seizure disorder. Your seizures are controlled on lacosamide 200mg twice daily, onfi 20mg twice daily, fycompa 3 tablets in the morning, and lamictal 600mg in the morning and night.     Do not miss any doses of your medication. If you do miss a dose, take it as soon as you remember even if that means doubling up on the dose. Please get a lab test to check out the blood level of your medication. Please get regular sleep. Go to sleep at the same time and wake up at the same time every day. People with epilepsy require more sleep than people without epilepsy.  Sleeping 10-12 hours a day can be normal for a person with epilepsy.  Give yourself the time you need to get enough sleep.     I would recommend scheduling a follow up appointment with Dr. Sanchez in order to go over the results and answer your questions regarding the neuropsychology testing. Also try calling the number she provided in order to schedule a psychiatry appointment.     Please avoid dangerous situations.  For example, no baths/pools by yourself, no heights, no power tools.  Please wear a bike helmet.  If you have a single breakthrough seizure, please patient portal me or call the office and we will decide the next steps. If you have multiple seizures in a row and do not return back to baseline, 911 needs to be called.     Return to clinic in 4 months or sooner with issues.  Please patient portal with any questions or concerns.    Ashely Peoples PA-C   Neurology-Epilepsy  [facility]

## 2022-04-11 LAB
LACOSAMIDE: 7.7 MCG/ML (ref 1–10)
LAMOTRIGINE SERPL-MCNC: 15.9 UG/ML (ref 2–15)

## 2022-04-12 LAB
CLOBAZAM SERPL-MCNC: 438 NG/ML (ref 30–300)
NORCLOBAZAM SERPL-MCNC: 3050 NG/ML (ref 300–3000)

## 2022-06-15 ENCOUNTER — OFFICE VISIT (OUTPATIENT)
Dept: PSYCHIATRY | Facility: CLINIC | Age: 47
End: 2022-06-15
Payer: COMMERCIAL

## 2022-06-15 DIAGNOSIS — F02.818 MAJOR NEUROCOGNITIVE DISORDER DUE TO ANOTHER MEDICAL CONDITION WITH BEHAVIORAL DISTURBANCE: ICD-10-CM

## 2022-06-15 DIAGNOSIS — Z78.9 CHRONIC HEALTH PROBLEM: ICD-10-CM

## 2022-06-15 DIAGNOSIS — F43.23 ADJUSTMENT DISORDER WITH MIXED ANXIETY AND DEPRESSED MOOD: Primary | ICD-10-CM

## 2022-06-15 PROCEDURE — 90791 PR PSYCHIATRIC DIAGNOSTIC EVALUATION: ICD-10-PCS | Mod: S$GLB,,, | Performed by: SOCIAL WORKER

## 2022-06-15 PROCEDURE — 90791 PSYCH DIAGNOSTIC EVALUATION: CPT | Mod: S$GLB,,, | Performed by: SOCIAL WORKER

## 2022-06-22 ENCOUNTER — TELEPHONE (OUTPATIENT)
Dept: NEUROLOGY | Facility: CLINIC | Age: 47
End: 2022-06-22
Payer: COMMERCIAL

## 2022-06-22 NOTE — TELEPHONE ENCOUNTER
----- Message from Tremontana Chevalier sent at 6/22/2022  8:49 AM CDT -----  Regarding: pt advice  Contact: pt @ [phone].  Pt says there has been recent changes in insurance for meds. The insurance is sending 3 prior auth for Dr. Roberts's approval. Pt says this is an alert that auth's were sent on yesterday. Pls call pt @ [phone].

## 2022-06-23 ENCOUNTER — TELEPHONE (OUTPATIENT)
Dept: NEUROLOGY | Facility: CLINIC | Age: 47
End: 2022-06-23
Payer: COMMERCIAL

## 2022-06-27 ENCOUNTER — TELEPHONE (OUTPATIENT)
Dept: NEUROLOGY | Facility: CLINIC | Age: 47
End: 2022-06-27

## 2022-06-27 NOTE — TELEPHONE ENCOUNTER
----- Message from Tree Taylor sent at 6/27/2022  4:06 PM CDT -----  Contact: @[phone]  Pt is requesting a call back from Ban pt states he is returning a missed call from last week.  Please call to discuss further.

## 2022-07-05 ENCOUNTER — TELEPHONE (OUTPATIENT)
Dept: NEUROLOGY | Facility: CLINIC | Age: 47
End: 2022-07-05
Payer: COMMERCIAL

## 2022-07-05 ENCOUNTER — PATIENT MESSAGE (OUTPATIENT)
Dept: NEUROLOGY | Facility: CLINIC | Age: 47
End: 2022-07-05
Payer: COMMERCIAL

## 2022-07-05 NOTE — TELEPHONE ENCOUNTER
[facility]edited [facility], 270 tabs/ 90 days,   Key [id number]    Your information has been submitted to [facility]. To check for an updated outcome later, reopen this PA request from your dashboard.    If [facility] has not responded to your request within 24 hours, contact [facility] at [phone]

## 2022-07-05 NOTE — TELEPHONE ENCOUNTER
Expedited PA initiated in [facility], 270 tabs/ 90 days,   Key [id number]    Outcome  Additional Information Required    Your PA has been resolved, no additional PA is required.     For further inquiries please contact the number on the back of the member prescription card. (Message [phone])    Drug  Lacosamide 200MG tablets    Form  [employer] Electronic PA Form (2017 NCPDP)

## 2022-07-05 NOTE — TELEPHONE ENCOUNTER
PA initiated in [facility], 180 tabs/ 90 days, Key [id number]    PA Approved through 7/4/2025, doc uploaded in media

## 2022-07-11 NOTE — TELEPHONE ENCOUNTER
"Appeal faxed to [phone]    Your fax has been successfully sent to [phone] at [phone].  ------------------------------------------------------------  From: [id number]  ------------------------------------------------------------  7/11/2022 10:13:50 AM Transmission Record   Sent to [phone] with remote ID "[id number]"   Result: (0/339;0/0) Success   Page record: 1 - 5   Elapsed time: 01:52 on channel 8      "

## 2022-07-24 ENCOUNTER — PATIENT MESSAGE (OUTPATIENT)
Dept: NEUROLOGY | Facility: CLINIC | Age: 47
End: 2022-07-24
Payer: COMMERCIAL

## 2022-07-28 NOTE — PROGRESS NOTES
Psychiatry Initial Visit (PhD/LCSW)  Diagnostic Interview - CPT 26651    Date: 6/15/2022    Site: [place]    Referral source: Aaareferral Self    Clinical status of patient: Outpatient    Nicholas Nagel Jr., a 46 y.o. male, for initial evaluation visit.  Met with patient.    Chief complaint/reason for encounter: depression, anxiety, cognition, somatic and interpersonal    History of present illness:  Late entry for 6/15/22.  46 year old mlae patient presented for initial evaluation, self-referral, stating chief complaint relating to life adjustment emotional challenges of navigating life choices with a major neurological disorder and tensions with a mother he depends on who is not supportive of his plans.  The patient is epileptic, has had significant neurological treatment, endorses some significant memory deficits, but in his opinion also some improvement.  Stated he is forgetful at times, slowed in his thinking, and prone to tangential train of thoughts.  He has been dependent on his family of origin, his parents.  Has a college education, both bachelor and masters level, but he also indicated his father was a alvarado of a [place] university for many years and apparently helped him to find a place in the academic system there.  State he went from obtaining a masters in industrial education in 2000 to working with computers at the [employer].  He indicated he has worked his job for years but does not feel included in any committes and feels completely ignored by peers in department decision-making processes.  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  Now, as of 3 years ago, he has met and is in a romantic relationship with someone the past 3 years, partner Jenelle, who works at [employer] and wants them to forma household together.  The  "patient said his mother worries about that idea.  Said "she worries about everything."  Not supportive of it.  Stated his mother seems to dominate all family decisions.  He feels like his mother is blocking him from moving forward in life.  He states some understanding that he has a neurological condition but believes he can be much more independent than his parents want to allow.  He is seeking clarity and coping skills for moving his life forward.  Denied any suicidal or homicidal ideation, mood swings, rages, psychosis or substance abuse issues.       Pain: noncontributory     Symptoms:   · Mood: poor concentration and decreased frustration toleration; sadness and discontent  · Anxiety: excessive anxiety/worry and restlessness/keyed up  · Substance abuse: denied  · Cognitive functioning: some undefined cognitive impairment history; endorses some memory deficits.   · Health behaviors: recurrent epileptic seizures, unpredictably     Psychiatric history: Currently in treatment with neurology for seizure disorder    Medical history: epilepsy with history of many seizures over his life span.      Family history of psychiatric illness: not known    Social history (marriage, employment, etc.):   Has 3 sisters.  All 4 siblings were raised by his two biological parents.  Father a retired [employer] in [place].  Literally grew up on a university campus, because of his father's work.  Main social ties were to his peers in school band through his high school years.  Endorsed college education; obtaining a b.s. degree in industrial technology and a masters in industrial education in 2000.  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  Reports depending on parents his whole life, housing, financial, support.  Now in his first ever committed relationship, to Jenelle for the past 3 years.  She is eager for the two of " them to co-habitate, and his mother is opposed to the plan, per the patient.  No  experience.  No gun ownership.        Substance use:   Alcohol: none   Drugs: none   Tobacco: none   Caffeine: not reported    Current medications and drug reactions (include OTC, herbal): see medication list      Strengths and liabilities: Strength: Patient accepts guidance/feedback, Strength: Patient is motivated for change., Liability: Patient lacks coping skills., Liability:  Primary support group is also target of his frustration and challenge.    Current Evaluation:     Mental Status Exam:  General Appearance:  unremarkable, age appropriate, casually dressed   Speech: normal tone, normal rate, normal pitch, normal volume      Level of Cooperation: cooperative      Thought Processes: goal-directed   Mood: anxious, sad      Thought Content: normal, no suicidality, no homicidality, delusions, or paranoia   Affect: shallow   Orientation: Oriented x3   Memory: memory in session demonstrated as functional.  Reporting some short-term memory deficits.   Attention Span & Concentration: intact   Fund of General Knowledge: intact and appropriate to age and level of education   Abstract Reasoning: Not formally assessed   Judgment & Insight: limited     Language  intact     Diagnostic Impression - Plan:       ICD-10-CM ICD-9-CM   1. Adjustment disorder with mixed anxiety and depressed mood  F43.23 309.28   2. 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 Chronic health problem  Z78.9 V49.89       Plan:individual psychotherapy    Return to Clinic: as scheduled, 8/3/22    Length of Service (minutes): 45

## 2022-08-03 ENCOUNTER — OFFICE VISIT (OUTPATIENT)
Dept: PSYCHIATRY | Facility: CLINIC | Age: 47
End: 2022-08-03
Payer: COMMERCIAL

## 2022-08-03 DIAGNOSIS — F43.23 ADJUSTMENT DISORDER WITH MIXED ANXIETY AND DEPRESSED MOOD: Primary | ICD-10-CM

## 2022-08-03 DIAGNOSIS — F02.818 MAJOR NEUROCOGNITIVE DISORDER DUE TO ANOTHER MEDICAL CONDITION WITH BEHAVIORAL DISTURBANCE: ICD-10-CM

## 2022-08-03 DIAGNOSIS — Z78.9 CHRONIC HEALTH PROBLEM: ICD-10-CM

## 2022-08-03 PROCEDURE — 99999 PR PBB SHADOW E&M-EST. PATIENT-LVL I: CPT | Mod: PBBFAC,,, | Performed by: SOCIAL WORKER

## 2022-08-03 PROCEDURE — 99999 PR PBB SHADOW E&M-EST. PATIENT-LVL I: ICD-10-PCS | Mod: PBBFAC,,, | Performed by: SOCIAL WORKER

## 2022-08-03 PROCEDURE — 90834 PR PSYCHOTHERAPY W/PATIENT, 45 MIN: ICD-10-PCS | Mod: S$GLB,,, | Performed by: SOCIAL WORKER

## 2022-08-03 PROCEDURE — 90834 PSYTX W PT 45 MINUTES: CPT | Mod: S$GLB,,, | Performed by: SOCIAL WORKER

## 2022-08-09 NOTE — PROGRESS NOTES
"Individual Psychotherapy (PhD/LCSW)    8/3/2022    Site:   [place]     Therapeutic Intervention: Met with patient, mother and father.  Outpatient - Insight oriented psychotherapy 45 min - CPT code 59178 and Outpatient - Supportive psychotherapy 45 min - CPT Code 97797    Chief complaint/reason for encounter: depression, anger, anxiety, interpersonal and life adjustment challenges     Interval history and content of current session:  46 year old male patient returning for his second session, first since initial diagnostic interview, brought with him his parents, whom he wanted to talk to with a therapist as third person witness and .  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  He spoke of his relationship with a significant other, Jenelle, and their hopes of moving in to start a domestic life together, which means moving out of his parent's location but also would mean changing towns.  He is already seeking other employment but it is unclear how work developments will unfold.  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  His epilepsy condition requires that he reside with someone for safety reasons, but he indicated he has become frustrated and wanting very much to gain more of a sense of independence.  He vented from resentment toward his parents in the session, which they responded to by quietly listening to his entire presentation before responding and then doing so in a soft way.  He had expressed frustration of past incidents when he asked questions of them and received deflecting answers, including being told "don't worry about it," on various occasions.  At the conclusion of that exchange, I rephrased patient's expressed sentiments as a deep " need and desire for some increased level of autonomy and self-direction in his life and of feeling frustrated at what still appear to be obstacles to him achieving that increased independence.  The patient denied any si/hi, psychosis, memory or other cognitive deficit, or substance abuse.  Supportive therapy provided. Follow up is scheduled for 9/14/22.       Treatment plan:  · Target symptoms: depression, anxiety , adjustment, interpersonal  · Why chosen therapy is appropriate versus another modality: relevant to diagnosis; patient responsive to this modality.  · Outcome monitoring methods:  self-report; observation.  · Therapeutic intervention type: insight-oriented psychotherapy; supportive psychotherapy.    Risk parameters:  Patient reports no suicidal ideation  Patient reports no homicidal ideation  Patient reports no self-injurious behavior  Patient reports no violent behavior    Verbal deficits: None    Patient's response to intervention:  The patient's response to intervention is accepting    Progress toward goals and other mental status changes:  The patient's progress toward goals is mixed.    Diagnosis:     ICD-10-CM ICD-9-CM   1. Adjustment disorder with mixed anxiety and depressed mood  F43.23 309.28   2. 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 Chronic health problem  Z78.9 V49.89       Plan:  individual psychotherapy    Return to clinic: as scheduled    Length of Service (minutes): 45

## 2022-09-02 ENCOUNTER — TELEPHONE (OUTPATIENT)
Dept: NEUROLOGY | Facility: CLINIC | Age: 47
End: 2022-09-02
Payer: COMMERCIAL

## 2022-09-02 NOTE — TELEPHONE ENCOUNTER
----- Message from Audra Patel sent at 9/2/2022 11:26 AM CDT -----  Contact: pt  Pt requesting a callback to get appt scheduled         Confirmed contact below:  Contact Name:Nicholas Robe  Phone Number: [phone]

## 2022-09-08 ENCOUNTER — TELEPHONE (OUTPATIENT)
Dept: PSYCHIATRY | Facility: CLINIC | Age: 47
End: 2022-09-08
Payer: COMMERCIAL

## 2022-09-08 NOTE — TELEPHONE ENCOUNTER
returned pt call to confirm which appointment he wanted cancelled. left vm for him to return call    ----- Message from Daina Chen sent at 9/7/2022  9:02 AM CDT -----  Regarding: Voicemail  Called to cancel appointment

## 2022-09-23 ENCOUNTER — OFFICE VISIT (OUTPATIENT)
Dept: NEUROLOGY | Facility: CLINIC | Age: 47
End: 2022-09-23
Payer: COMMERCIAL

## 2022-09-23 VITALS
HEART RATE: 86 BPM | HEIGHT: 71 IN | DIASTOLIC BLOOD PRESSURE: 85 MMHG | SYSTOLIC BLOOD PRESSURE: 128 MMHG | BODY MASS INDEX: 28.37 KG/M2 | WEIGHT: 202.63 LBS

## 2022-09-23 DIAGNOSIS — Z60.4 ISOLATION, SOCIAL: ICD-10-CM

## 2022-09-23 DIAGNOSIS — G40.219 LOCALIZATION-RELATED SYMPTOMATIC EPILEPSY AND EPILEPTIC SYNDROMES WITH COMPLEX PARTIAL SEIZURES, INTRACTABLE, WITHOUT STATUS EPILEPTICUS: Primary | ICD-10-CM

## 2022-09-23 DIAGNOSIS — F06.30 MOOD DISORDER DUE TO MEDICAL CONDITION: ICD-10-CM

## 2022-09-23 DIAGNOSIS — Z96.89 S/P PLACEMENT OF VNS (VAGUS NERVE STIMULATION) DEVICE: ICD-10-CM

## 2022-09-23 PROCEDURE — 99417 PROLNG OP E/M EACH 15 MIN: CPT | Mod: S$GLB,,,

## 2022-09-23 PROCEDURE — 99417 PR PROLONGED SVC, OUTPT, W/WO DIRECT PT CONTACT,  EA ADDTL 15 MIN: ICD-10-PCS | Mod: S$GLB,,,

## 2022-09-23 PROCEDURE — 1159F MED LIST DOCD IN RCRD: CPT | Mod: CPTII,S$GLB,,

## 2022-09-23 PROCEDURE — 3074F PR MOST RECENT SYSTOLIC BLOOD PRESSURE < 130 MM HG: ICD-10-PCS | Mod: CPTII,S$GLB,,

## 2022-09-23 PROCEDURE — 99999 PR PBB SHADOW E&M-EST. PATIENT-LVL IV: ICD-10-PCS | Mod: PBBFAC,,,

## 2022-09-23 PROCEDURE — 3079F PR MOST RECENT DIASTOLIC BLOOD PRESSURE 80-89 MM HG: ICD-10-PCS | Mod: CPTII,S$GLB,,

## 2022-09-23 PROCEDURE — 99215 OFFICE O/P EST HI 40 MIN: CPT | Mod: S$GLB,,,

## 2022-09-23 PROCEDURE — 3079F DIAST BP 80-89 MM HG: CPT | Mod: CPTII,S$GLB,,

## 2022-09-23 PROCEDURE — 3074F SYST BP LT 130 MM HG: CPT | Mod: CPTII,S$GLB,,

## 2022-09-23 PROCEDURE — 3008F PR BODY MASS INDEX (BMI) DOCUMENTED: ICD-10-PCS | Mod: CPTII,S$GLB,,

## 2022-09-23 PROCEDURE — 1159F PR MEDICATION LIST DOCUMENTED IN MEDICAL RECORD: ICD-10-PCS | Mod: CPTII,S$GLB,,

## 2022-09-23 PROCEDURE — 99999 PR PBB SHADOW E&M-EST. PATIENT-LVL IV: CPT | Mod: PBBFAC,,,

## 2022-09-23 PROCEDURE — 3008F BODY MASS INDEX DOCD: CPT | Mod: CPTII,S$GLB,,

## 2022-09-23 PROCEDURE — 99215 PR OFFICE/OUTPT VISIT, EST, LEVL V, 40-54 MIN: ICD-10-PCS | Mod: S$GLB,,,

## 2022-09-23 RX ORDER — SERTRALINE HYDROCHLORIDE 50 MG/1
50 TABLET, FILM COATED ORAL DAILY
Qty: 90 TABLET | Refills: 3 | Status: SHIPPED | OUTPATIENT
Start: 2022-09-23 | End: 2023-01-18

## 2022-09-23 SDOH — SOCIAL DETERMINANTS OF HEALTH (SDOH): SOCIAL EXCLUSION AND REJECTION: Z60.4

## 2022-09-23 NOTE — PATIENT INSTRUCTIONS
You came to [facility] because of your seizure disorder. Your seizures are partially controlled on lacosamide 200mg twice daily, clobazam 20mg twice daily, perampanel 6mg daily, and lamotrigine 600mg twice daily. Please continue the same medications at the same dose.     We increased your VNS today.     Do not miss any doses of medication. If a dose of medication is missed, take it as soon as it is remembered even if that means doubling up on the dose. Get regular sleep. Go to sleep at the same time and wake up at the same time every day. People with epilepsy require more sleep than people without epilepsy.  Sleeping 10-12 hours a day can be normal for a person with epilepsy.      You are anxious. I would like you to try a new medication called sertraline. Please take 1/2 tablet for 14 days then increase to 1 tablet daily. You can take this medication in the morning or at night, with or without food. You will have some side effects as you start this medication like GI upset, changes in appetite, changes in sleep, possibly more anxiety. Please push through, your body will adjust.  Please do a self inventory in two months to see how you are doing with the medication and give me an update through the portal at that time.     Next time you see me, we will also schedule an appointment with Ethan on the same day to help answer your questions.     Per [place] law, no episodes of loss of consciousness for 6 months before driving.  Avoid dangerous situations.  For example, no baths/pools alone, no heights, no power tools.  Wear a bike helmet.  If breakthrough seizures occur that are different in character, frequency, or duration from normal episodes, please patient portal me or call the office and we will decide the next steps. If multiple seizures occur in a row without return back to baseline, 911 needs to be called.     Return to clinic in 3 months or sooner with issues. They will call you to schedule this  appointment. Please patient portal with any questions or concerns.    Ashely Peoples PA-C   Neurology-Epilepsy  [facility]

## 2022-09-23 NOTE — PROGRESS NOTES
CC: Epilepsy    Interval Events/ROS 9/23/2022:    Current ASM/SEs: lacosamide 200mg BID, clobazam 20mg BID, perampanel 6mg daily, lamotrigine 600mg BID, and a VNS; SE anger   Breakthrough seizures/events: current frequency is around one per month; last event was 9/17/2022   Driving: no  Sleep: not well; up late w/ ruminating thoughts/mind racing  Mood: anxious, angry, lonely    Otherwise, no fever, no cold symptoms, no headache, no changes in vision, no new weakness, no chest pain, no shortness of breath, no nausea, no vomiting, no diarrhea, no constipation, no tingling/numbness, no problems walking.    Recent Labs   Lab 05/06/20  1144 07/31/20  1006 05/14/21  1155 04/07/22  1445   Lamotrigine Lvl 16.1 H 14.5 14.1 15.9 H   Lacosamide 10.2 H 10.5 H 11.1 H 7.7   Clobazam 849.0 H 805.0 H 859.0 H 438.0 H   Desmethylclobazam 7820.0 H 6160.0 H 5810.0 H 3050.0 H   Perampanel (Fycompa) Quant  --   --  140  --       Interval Events/ROS 4/7/2022:  - Presents with parents who also contribute to the history  - Patient is a 45 y/o man with a hx of focal epilepsy s/p left temporal resection (2008) and a VNS (2019)  - Current seizure frequency is around one focal event with preserved awareness about every 10-14 days though reports he did have a seizure w/ impaired awareness last week that occurred at work, unsure how long it lasted though believes it lasted longer than his other events   - No missed doses of medication, no other identifiable trigger of this larger event although he mentions he did have a sinus infection at the time   - He is on a current medication regimen of lacosamide 200mg BID, clobazam 20mg BID, perampanel 6mg daily, and lamotrigine 600mg BID with no reported side effects  - Tolerating current VNS settings well  - Has questions and would like further clarification regarding neurocognitive testing results   - Otherwise, no fever, no cold symptoms, no headache, no changes in vision, no new weakness, no chest  pain, no shortness of breath, no nausea, no vomiting, no diarrhea, no constipation, no tingling/numbness, no problems walking    Prior note:   Presents with father   11/18/21 (not clear), 11/27/21   Dec 3 and 9 - event of 10-15 sec dizziness   Episodes of easily irritability at work   Pt is a poor historian     Prior note:   Sz log:   Oct 29 - GTC   Oct 30 - GTC (despite taking ativan 2 hrs before) trigger - anxiety, nervous   Today had a bout of dizziness and imbalance - this is a rare occurrence about 1-2 hrs after taking meds      Pt of Dr. Chan  Dizziness much improved    Last sz - 9/22/21 -   Semiology re-surgery - several thoughts race through mind, limb jerking    Semiology after surgery - distracted by thoughts, less jerking      Seizure log since last visit: none  - last sz: 1/19/2021  - possible sz 4/28 ?unsure   - mostly dizziness is the issue     Current AEDs:   -  mg bid  (200mg 1 1/2 tabs bid)   - PER 6 mg q day (2mg 3 tabs q AM)   - CLOB 40 mg bid (20mg 2 tabs bid) - suggested increase at last visit 50mg bid - but patient decreased to 30mg bid due to dizziness  PCP decreased 20mg-30mg - per pt and his father, the change has decreased the dizziness over the past week  - LTG 600mg bid (200mg 3 tabs bid)  - VNS      Results for ANTWANGEORGETTE JR. (MRN [id number]) as of 5/31/2021 08:29    Ref. Range 4/24/2019 14:03 5/6/2020 11:44 7/31/2020 10:06 5/14/2021 11:55   Clobazam Latest Ref Range: 30 - 300 ng/mL 916.0 (H) 849.0 (H) 805.0 (H) 859.0 (H)   Lacosamide Latest Ref Range: 1.0 - 10.0 mcg/mL 7.5 10.2 (H) 10.5 (H) 11.1 (H)   Lamotrigine Lvl Latest Ref Range: 2.0 - 15.0 ug/mL 13.2 16.1 (H) 14.5 14.1   Perampanel (Fycompa) Quant Latest Units: ng/mL       140   Desmethylclobazam Latest Ref Range: 300 - 3000 ng/mL 6180.0 (H) 7820.0 (H) 6160.0 (H) 5810.0 (H)         Notes from last clinic visit, 3/11/2021:  Seizure log since last visit:  1/19  12/2  10/12      Results for GEORGETTE MONCADA   (MRN [id number]) as of 3/11/2021 11:29    Ref. Range 7/13/2017 12:55 4/24/2019 14:03 5/6/2020 11:44 7/31/2020 10:06   Lacosamide Latest Ref Range: 1.0 - 10.0 mcg/mL 6.8 7.5 10.2 (H) 10.5 (H)   Lamotrigine Lvl Latest Ref Range: 2.0 - 15.0 ug/mL 12.6 13.2 16.1 (H) 14.5   Perampanel (Fycompa) Quant Latest Units: ng/mL 160         Clobazam Latest Ref Range: 30 - 300 ng/mL 894 (H) 916.0 (H) 849.0 (H) 805.0 (H)   Desmethylclobazam Latest Ref Range: 300 - 3000 ng/mL 5904 (H) 6180.0 (H) 7820.0 (H) 6160.0 (H)      Current AEDs:   - LCS 300mg bid  (200mg 1 1/2 tabs bid)  - PER 6 mg q day (2mg 3 tabs q AM)   - CLOB 40 mg bid (20mg 2 tabs bid)  - LTG 600mg bid (200mg 3 tabs bid)  - VNS   Model # AspireSR M106  Implant Date: 2/21/2019  Is Device palpable in chest wall                     Yes  Side of implant                                                L                                                       Initial               Reprogram   Output current             mA      0.675               0.675  Signal Freq                 Hz        30                    30  Pulse width                 uSec    750                  1000    Signal on time             Sec      30                    30  Signal off time             Min      10.0                 10.0    Autostimulation (AS)  Output Current            mA       0.75                 0.75  Pulse width                 uSec    750                  1000  Signal on time             Sec      60    Magnet settings  Output current             mA       0.75                 0.75  Pulse width                 uSec    750                  1000  Signal on time             Sec      60                    60     Tachycardia detect                 On/Off              On/off  Threshhold for AS       %                                         Seizure hx:   - onset at age 8 years     - RUE and head jerking movements -> GTC sz   - no hx of tongue/cheek bite or bowel incontinence; infrequently bladder  incontinence  - epilepsy surgery done at [facility], [place] in 2008 - possibly less frequent per father; patient feels that he is more aware of it coming on and is related to triggers  - VNS placed in 2019 - did not make any difference per father     Notes from last clinic visit with , 8/12/2020:  Patient may have had a seizure on Jun 30.  He had a typical warning, swiped the VNS and did not lose awareness.  That is the only symptoms he experienced.  He is not experiencing any AEs from the meds. He does not have any other medical issues.    2020/05/14  He has noticed intermittent dizziness particularily with change in posture.  Dad fell his comprehension is not as good.  He reports two seizures - 5 days ago after missed AM dose.  The second occurred at work when he went to see his supervisor. 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  Starting 2-3 weeks after last visit he began to have the intermittyent dizziness.  Lamictal level is high enough that there may be a pharmacodynamic interaction with lacosamide.  2019/12/12  Since the last visit he had only two seizures and he retained awareness with both.  He has been taking ativan when he goes to football games.  Lamictal levels have been stable and the clearance calculated from the dose/level indicates he is a rapid metabolizer.    2019/08/29  Patient reports three sz since last visit.  All have been partial with no convulsions.  He is tolerating the VNS well.  He has not been using the magnet to block seizures..  He is tolerating the medication well.  In the past he would usually have a seizure when he went to a game.  I have given him 1 mg ativan to take 1 hrs before a game and  This has worked well in preventing seizures.    2019/04/24  Doing well since last visit, no reported seizures. Tolerated initial VNS settings well. Returns today for continued VNS adjustment. Medication regimen unchanged.   2019/03/27  VNS implanted 2/21/19,  tolerated procedure well. He was seen by NSGY in a virtual visit 3/7/19 for his 2 week post-op appointment, incision clean/dry/intact and well approximated at that time, and on exam today. Patient/family report no recent seizures. Continues to take Lacosamide, Clobazam, Lamotrigine, and Perampanel as prescribed. Presents today for first VNS adjustment.   2018/09/13  Patient reports 3 mild CPS since the last visit.  Last visit I gave him a schedule to increase lacosamide in two steps to 200 mg 1½ BID.  When he reached this dose he became dizzy and reduced the morning dose to 1 tab with resolution of the dizzinesss.  Last visit I discussed VNS treatment and today the patient wants to proceed with implanting the device.  He has no other medical problems except allergies.  He does take loratadine which works on the H1 reception similar to Benadryl but has not been been implicated in lowering seizure threshhold.    2018/01/26  The patient reports 3 sz since the last visit.  The most recent may have resulted from missed dose.  No triggers evident for the other 2 sz.  The patient reports he has an aura but can not describe what he perceives.    2017/07/13  In the past 6 months the patient has experienced five seizures (about 1 per month).   The patient was having difficulty expressing himself - speech is halting and disconnected.  He has been taking ativan prior to social event or watching sports to prevent seizure.  He has been taking ativan about 2 times a month.    2016/04/19 and 2017/01/23              Since the last visit the patient has experienced one seizures (12 Jan 2017) .  Meds were increased last time and he is tolerating the new dosages well.  There was no obvious provocative reason.  He has a good strategy for compliance.  He had no concurrent medical problem and had not taken benadryl or other proconvulsive medication.    pt has had the following episodes:  2015/4/8 - Pt had just finished a walk.  Eye  blinking and stereotypic hand movements.  No generalized convulsions were noted. Didn't lose consciousness.  Lasted 30s to 1 minute.  2015/6/6 - Pt was in his room.  Came to his dad to give him his phone to record what was happening.  Said he has the feeling that he's about to have a seizure.  Says there is no aura per se.  Says a thought that he's about to have a seizure pops into his head, which usually precedes his events.  Said his speech got markedly worse during the event.  Eye blinking lasted 30s to 1 minute and resolved spontaneously.  Also had stereotypic hand movements and eye blinking.  Maintained consciousness throughout episode.  Pt had no memory of the seizure, which was relayed by his dad.     2015/6/8 - Pt was running around a track.  Said he overexerted himself over the last three laps.  Said he had another event immediately after exercise.  Again, this episode was brief (30s-1min) and was characterized by walking in circles, eye blinking, and stereotypic hand motions of rubbing his fingers. Had no intraictal recall.       Previous History (2015/4/1)              The patient was referred for consultation by Dr. Guerrero due to want to transfer care here due to long distance, patient is living [facility].  The patient was accompanied by his mother and father who provided some of the history.       Seizure surgery 2008 for epilepsy. Since then, less frequent and not as strong, still have seizure twice a month with current medication dosage. He is compliant with medication, tolerates medication well, no side effect noticed. On current regime for about 5 years, change to onfi 2-3 years ago in place of keppra.         Seizure Seminology  Seizure Type 1  Classification:   Aura - feel it is coming, his mind told him that seizure is coming  Ictus  - Nonconv -  - Conv - whole body jerking more on the right side, after a couple of jerk, he is out, first started has tongue biting and incontinence  - Duration - 2  minutes  Post-ictal  - Symptoms: can not talk, sleepiness no headache  - Duration: 5-10 minutes  Age of onset : 6 year old  Current Seizure Frequency -  Initially once week,  Last Seizure: 10 years ago     Seizure Type 2: after seizure  Classification:   Aura - feel it is coming, but not as strong as before  Ictus  - Nonconv -  - Conv - blinking and body jerk more on the arm.   - Duration - 30 sec  Post-ictal  - Symptoms: no sleepiness  - Duration: back to baseline after ictus right away  Age of onset : 29  Current Seizure Frequency -  Last Seizure: a couple weeks ago  Triggers: stress, missed meds, computer use, overexertion     Any other relevant information:  - none     PREVIOUS EVALUATIONS:    Previous EEGs:   - per 's notes, C-EEG in 2014: 'not abnormal'     Previous MRIs:  - per 's notes, MRI in 2015: 'no new changes'     Additional tests:  1)CT Scan:   2) EEG\Video Monitoring: no  3) PET Scan: no  4) Neuropsychological evaluation: no  5) DEXA Scan: no  6) Others: no     RISK FACTORS FOR SEIZURES:    1. Head Trauma:  No    2. CNS Infections:  No  3. CNS Tumors: No     4. CNS Vascular Disease: No     5. Febrile Seizures: No    6. Developmental Delay: No       7. Family History of Seizures: No    8. Birth history: FTNVD     Pregnancy/Labor/Delivery: n/a     CURRENT MEDICATIONS:   Current Medications               Current Outpatient Medications   Medication Sig Dispense Refill    cloBAZam (ONFI) 20 mg Tab TAKE TWO TABLETS BY MOUTH TWICE DAILY. GENERIC EQUIVALENT TO ONFI. 360 tablet 2    FLUARIX QUAD 6827-0714, PF, 60 mcg (15 mcg x 4)/0.5 mL Syrg vaccine     0    lacosamide (VIMPAT) 200 mg Tab tablet Take 1.5 tablets (300 mg total) by mouth every 12 (twelve) hours. 270 tablet 2    lamoTRIgine (LAMICTAL) 200 MG tablet Take 3 tablets (600 mg total) by mouth 2 (two) times daily. 540 tablet 2    LORazepam (ATIVAN) 1 MG tablet Take 1 tablet (1 mg total) by mouth as needed (seizure). 5 tablet 0     multivitamin (THERAGRAN) per tablet Take 1 tablet by mouth once daily.        perampaneL (FYCOMPA) 2 mg tablet Take 3 tablets (6 mg total) by mouth once daily. 270 tablet 2      No current facility-administered medications for this visit.         Folic acid: no     CURRENT ANTI EPILEPTIC MEDICATIONS:  See hpi     VAGAL NERVE STIMULATOR:     923/2022  Model #     AspireSR M106 S/N [id number]  Implant Date    02/21/2019  Is Device palpable in chest wall  Yes   Side of implant    L           Initial  Reprogram   Output current  mA 0.75  0.875  Signal Freq          Hz 30  30  Pulse width         uSec 500  500  Signal on time      Sec 60  30  Signal off time      Min 5.0  5.0    Magnet settings  Output current          mA 1.0  1.125  Pulse width         uSec 500  500  Signal on time         Sec 60  60    Autostimulation (AS)  Output Current          mA 0.875  1.0  Pulse width         uSec 500  500  Signal on time         Sec 30  30  Tachycardia detect  On  On      VNS was interrogated today (4/7/2022) and the following changes were made:     Model # AspireSR M106  Implant Date: 2/21/2019  Is Device palpable in chest wall                     Yes  Side of implant                                                L                                                             Prior settings:  Output current             mA       0.675               Signal Freq                 Hz        30                      Pulse width                 uSec    500               Signal on time             Sec      30                     Signal off time             Min      5.0                   Duty cycle 10%    Autostimulation (AS)  Output Current            mA       0.75                  Pulse width                 uSec    500                   Signal on time             Sec      30    Magnet settings  Output current             mA       0.75                  Pulse width                 uSec    500                    Signal on time             Sec       60                       Tachycardia detect     On/Off                Threshhold for AS       %                                      Lead impedence 3289 Ohms  Generator battery: 50-75 %     New settings:                                                         Output current             mA       0.75               Signal Freq                 Hz        30                      Pulse width                 uSec    500               Signal on time             Sec      60                     Signal off time             Min      5.0                   Duty cycle 18%    Autostimulation (AS)  Output Current            mA       0.875                  Pulse width                 uSec    500                   Signal on time             Sec      30    Magnet settings  Output current             mA       1.0                  Pulse width                 uSec    500                    Signal on time             Sec      60                       Tachycardia detect     On/Off                Threshhold for AS       %                                      Generator battery: 50-75%     PRIOR ANTICONVULSANT HISTORY:   carbamazepine (Tegretol, CBZ):        tried in the past  ethosuximide (Zarontin, ESM):           tried in the past  felbamate (felbatol, FBM):                  tried in the past  gabapentin (Neurontin, GPN):            tried in the past  levetiracetam (Keppra, LEV):             tried in the past  phenytoin (Dilantin, PHT):                  tried in the past  primidone (Mysoline, PRM):               tried in the past  topiramate (Topamax, TPM):             tried in the past  valproic acid (Depakote, VPA):          tried in the past   clorazepate (Tranxene, CLZ):            Stopped previous visits.       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    Environmental and seasonal allergies 09/13/2018    Epilepsy 02/21/2019    S/P placement of VNS (vagus nerve stimulation) device 03/27/2019              Resolved Ambulatory Problems     Diagnosis Date Noted    No Resolved Ambulatory Problems      No Additional Past Medical History      PAST PSYCHIATRIC HISTORY:  no     PAST SURGICAL HISTORY including Epilepsy surgery:             Past Surgical History:   Procedure Laterality Date    epilepsy surgery Left 2008    VAGUS NERVE STIMULATOR INSERTION Left 2/21/2019     Procedure: INSERTION, VAGUS NERVE STIMULATOR;  Surgeon: Teddy Saldana MD;  Location: [facility] OR [address] [place] [address];  Service: Neurosurgery;  Laterality: Left;  toronto II, asa 2, regular bed, supine,      FAMILY HISTORY:             Family History   Problem Relation Age of Onset    Diabetes Father      Cancer Father      Diabetes Sister      Cancer Paternal Aunt      Diabetes Paternal Uncle      Cancer Paternal Grandmother      Diabetes Paternal Grandfather      Cancer Paternal Grandfather         SOCIAL HISTORY:               Social History                   Socioeconomic History    Marital status: Single       Spouse name: Not on file    Number of children: Not on file    Years of education: Not on file    Highest education level: Not on file   Occupational History    Not on file   Tobacco Use    Smoking status: Never Smoker   Substance and Sexual Activity    Alcohol use: No    Drug use: No    Sexual activity: Not on file   Other Topics Concern    Not on file   Social History Narrative    Not on file      Social Determinants of Health            Financial Resource Strain:     Difficulty of Paying Living Expenses:    Food Insecurity:     Worried About Running Out of Food in the Last Year:     Ran Out of Food in the Last Year:    Transportation Needs:     Lack of Transportation (Medical):     Lack of Transportation (Non-Medical):    Physical Activity:      Days of Exercise per Week:     Minutes of Exercise per Session:    Stress:     Feeling of Stress :    Social Connections:     Frequency of Communication with Friends and Family:     Frequency of Social Gatherings with Friends and Family:     Attends Voodoo Services:     Active Member of Clubs or Organizations:     Attends Club or Organization Meetings:     Marital Status:             a) Marital status: Single                                                    b) Living situation: patient lives with parents  c) Employed/Unemployed/Other: Employed full time  - works in a [employer] [place]     DRIVING HISTORY:  Currently driving: No       LEVEL OF EDUCATION: Masters in Industrial education     SUBSTANCE USE: none     ALLERGIES: Patient has no known allergies.      REVIEW OF SYSTEMS:  Review of Systems     PHYSICAL EXAM:  - General: Awake, cooperative, NAD.  - HEENT: NC/AT  - Neck: Supple  - Pulmonary: no increased WOB  - Cardiac: well perfused   - Abdomen: soft, nontender, nondistended  - Extremities: no edema  - Skin: no rashes or lesions noted     NEURO EXAM:   - Mental Status: Awake, alert, oriented x 3. Attentive to examiner. Language is hesitant with intact repetition and comprehension. Normal prosody. There were no paraphasic errors. Able to name both high and low frequency objects. Speech was not dysarthric. Able to follow both midline and appendicular commands. There was no evidence of apraxia or neglect.     - Cranial Nerves:   EOMI. No facial droop. Hearing intact to room voice. 5/5 strength in trapezii. Tongue protrudes in midline and to either side with no evidence of atrophy or weakness.     - Motor: Normal bulk and tone throughout. No pronator drift bilaterally. No adventitious movements such as tremor or asterixis noted.   - Sensory: No deficits to light touch.   - Coordination: No dysmetria on FNF  - Gait: Good initiation. Narrow-based, normal stride and arm swing.       IMPRESSION:    Focal epilepsy, onset at age 8 years  intractable s/p epi surgery at [facility] in [place] (left temporal resection, 2008) and VNS (2019)  Speech fluency impaired   Episodes of brief dizziness - forced displacement? He gets irritable after the event   Feels irritable when overstimulated by people around him talking      Plan:   - continue lacosamide 200mg BID  - continue perampanel 6mg qhs  - continue clobazam 20mg BID   - continue lamotrigine 600mg BID    - VNS settings increased and summarized above, advised patient to reach out over the portal with any issues   - Patient's primary concern today is his mood. Trial of sertraline 50mg daily. Referral to social work for additional support. Close follow up in 3 months or sooner with issues and will discuss ASM adjustments at that time but today will focus on improving mood and sleep which in turn will likely aid in seizure control as well.     Patient is comfortable with plan and verbalizes agreement. All questions and concerns are addressed at this time.      Ashely Peoples PA-C   Neurology-Epilepsy  [facility]    Collaborating physician, Dr. Harvey Roberts, was available during today's encounter.     I spent approximately 75 minutes on the day of this encounter preparing to see the patient, obtaining and reviewing history and results, performing a medically appropriate exam, counseling and educating the patient/family/caregiver, documenting clinical information, coordinating care, and ordering medications, tests, procedures, and referrals.

## 2023-01-18 ENCOUNTER — LAB VISIT (OUTPATIENT)
Dept: LAB | Facility: HOSPITAL | Age: 48
End: 2023-01-18
Payer: COMMERCIAL

## 2023-01-18 ENCOUNTER — OFFICE VISIT (OUTPATIENT)
Dept: NEUROLOGY | Facility: CLINIC | Age: 48
End: 2023-01-18
Payer: COMMERCIAL

## 2023-01-18 VITALS
WEIGHT: 199.94 LBS | HEART RATE: 90 BPM | BODY MASS INDEX: 27.99 KG/M2 | DIASTOLIC BLOOD PRESSURE: 76 MMHG | HEIGHT: 71 IN | SYSTOLIC BLOOD PRESSURE: 127 MMHG

## 2023-01-18 DIAGNOSIS — G40.219 LOCALIZATION-RELATED SYMPTOMATIC EPILEPSY AND EPILEPTIC SYNDROMES WITH COMPLEX PARTIAL SEIZURES, INTRACTABLE, WITHOUT STATUS EPILEPTICUS: Primary | ICD-10-CM

## 2023-01-18 DIAGNOSIS — G40.219 LOCALIZATION-RELATED SYMPTOMATIC EPILEPSY AND EPILEPTIC SYNDROMES WITH COMPLEX PARTIAL SEIZURES, INTRACTABLE, WITHOUT STATUS EPILEPTICUS: ICD-10-CM

## 2023-01-18 DIAGNOSIS — F06.30 MOOD DISORDER DUE TO MEDICAL CONDITION: ICD-10-CM

## 2023-01-18 DIAGNOSIS — Z96.89 S/P PLACEMENT OF VNS (VAGUS NERVE STIMULATION) DEVICE: ICD-10-CM

## 2023-01-18 DIAGNOSIS — Z60.4 ISOLATION, SOCIAL: ICD-10-CM

## 2023-01-18 PROCEDURE — 3008F BODY MASS INDEX DOCD: CPT | Mod: CPTII,S$GLB,,

## 2023-01-18 PROCEDURE — 80175 DRUG SCREEN QUAN LAMOTRIGINE: CPT

## 2023-01-18 PROCEDURE — 3074F PR MOST RECENT SYSTOLIC BLOOD PRESSURE < 130 MM HG: ICD-10-PCS | Mod: CPTII,S$GLB,,

## 2023-01-18 PROCEDURE — 99417 PR PROLONGED SVC, OUTPT, W/WO DIRECT PT CONTACT,  EA ADDTL 15 MIN: ICD-10-PCS | Mod: S$GLB,,,

## 2023-01-18 PROCEDURE — 80339 ANTIEPILEPTICS NOS 1-3: CPT

## 2023-01-18 PROCEDURE — 3074F SYST BP LT 130 MM HG: CPT | Mod: CPTII,S$GLB,,

## 2023-01-18 PROCEDURE — 80235 DRUG ASSAY LACOSAMIDE: CPT

## 2023-01-18 PROCEDURE — 1159F PR MEDICATION LIST DOCUMENTED IN MEDICAL RECORD: ICD-10-PCS | Mod: CPTII,S$GLB,,

## 2023-01-18 PROCEDURE — 95970 PR ANALYZE NEUROSTIM,NO REPROG: ICD-10-PCS | Mod: S$GLB,,,

## 2023-01-18 PROCEDURE — 99215 PR OFFICE/OUTPT VISIT, EST, LEVL V, 40-54 MIN: ICD-10-PCS | Mod: 25,S$GLB,,

## 2023-01-18 PROCEDURE — 99999 PR PBB SHADOW E&M-EST. PATIENT-LVL III: ICD-10-PCS | Mod: PBBFAC,,,

## 2023-01-18 PROCEDURE — 1159F MED LIST DOCD IN RCRD: CPT | Mod: CPTII,S$GLB,,

## 2023-01-18 PROCEDURE — 99999 PR PBB SHADOW E&M-EST. PATIENT-LVL III: CPT | Mod: PBBFAC,,,

## 2023-01-18 PROCEDURE — 80339 ANTIEPILEPTICS NOS 1-3: CPT | Mod: 91

## 2023-01-18 PROCEDURE — 3008F PR BODY MASS INDEX (BMI) DOCUMENTED: ICD-10-PCS | Mod: CPTII,S$GLB,,

## 2023-01-18 PROCEDURE — 99417 PROLNG OP E/M EACH 15 MIN: CPT | Mod: S$GLB,,,

## 2023-01-18 PROCEDURE — 36415 COLL VENOUS BLD VENIPUNCTURE: CPT

## 2023-01-18 PROCEDURE — 95970 ALYS NPGT W/O PRGRMG: CPT | Mod: S$GLB,,,

## 2023-01-18 PROCEDURE — 99215 OFFICE O/P EST HI 40 MIN: CPT | Mod: 25,S$GLB,,

## 2023-01-18 PROCEDURE — 3078F PR MOST RECENT DIASTOLIC BLOOD PRESSURE < 80 MM HG: ICD-10-PCS | Mod: CPTII,S$GLB,,

## 2023-01-18 PROCEDURE — 3078F DIAST BP <80 MM HG: CPT | Mod: CPTII,S$GLB,,

## 2023-01-18 RX ORDER — LACOSAMIDE 200 MG/1
200 TABLET ORAL EVERY 12 HOURS
Qty: 60 TABLET | Refills: 5 | Status: SHIPPED | OUTPATIENT
Start: 2023-01-18 | End: 2023-02-02 | Stop reason: SDUPTHER

## 2023-01-18 RX ORDER — LAMOTRIGINE 200 MG/1
600 TABLET ORAL 2 TIMES DAILY
Qty: 540 TABLET | Refills: 3 | Status: SHIPPED | OUTPATIENT
Start: 2023-01-18 | End: 2023-02-02 | Stop reason: SDUPTHER

## 2023-01-18 RX ORDER — SERTRALINE HYDROCHLORIDE 100 MG/1
100 TABLET, FILM COATED ORAL DAILY
Qty: 90 TABLET | Refills: 3 | Status: SHIPPED | OUTPATIENT
Start: 2023-01-18 | End: 2023-05-30

## 2023-01-18 SDOH — SOCIAL DETERMINANTS OF HEALTH (SDOH): SOCIAL EXCLUSION AND REJECTION: Z60.4

## 2023-01-18 NOTE — PROGRESS NOTES
CC: Epilepsy    Interval Events/ROS 1/18/2023:    Accompanied by parents who also contribute to the history.     Current ASM/SEs: lacosamide 200mg BID, clobazam 20mg BID, perampanel 6mg daily, lamotrigine 600mg BID, and a VNS; SE anger/irritability  Breakthrough seizures/events: 7 seizures in the past 4 months, no identifiable trigger  Driving: no  Sleep: not great  Mood: anxious, angry, lonely; notes mild improvement w/ sertraline 50mg daily    Chronic memory issues. Otherwise, no fever, no cold symptoms, no headache, no changes in vision, no new weakness, no chest pain, no shortness of breath, no nausea, no vomiting, no diarrhea, no constipation, no tingling/numbness, no problems walking.    Recent Labs   Lab 05/06/20  1144 07/31/20  1006 05/14/21  1155 04/07/22  1445   Lamotrigine Lvl 16.1 H 14.5 14.1 15.9 H   Lacosamide 10.2 H 10.5 H 11.1 H 7.7   Clobazam 849.0 H 805.0 H 859.0 H 438.0 H   Desmethylclobazam 7820.0 H 6160.0 H 5810.0 H 3050.0 H   Perampanel (Fycompa) Quant  --   --  140  --        Interval Events/ROS 9/23/2022:    Current ASM/SEs: lacosamide 200mg BID, clobazam 20mg BID, perampanel 6mg daily, lamotrigine 600mg BID, and a VNS; SE anger   Breakthrough seizures/events: current frequency is around one per month; last event was 9/17/2022   Driving: no  Sleep: not well; up late w/ ruminating thoughts/mind racing  Mood: anxious, angry, lonely    Otherwise, no fever, no cold symptoms, no headache, no changes in vision, no new weakness, no chest pain, no shortness of breath, no nausea, no vomiting, no diarrhea, no constipation, no tingling/numbness, no problems walking.    Recent Labs   Lab 05/06/20  1144 07/31/20  1006 05/14/21  1155 04/07/22  1445   Lamotrigine Lvl 16.1 H 14.5 14.1 15.9 H   Lacosamide 10.2 H 10.5 H 11.1 H 7.7   Clobazam 849.0 H 805.0 H 859.0 H 438.0 H   Desmethylclobazam 7820.0 H 6160.0 H 5810.0 H 3050.0 H   Perampanel (Fycompa) Quant  --   --  140  --       Interval Events/ROS  4/7/2022:  - Presents with parents who also contribute to the history  - Patient is a 47 y/o man with a hx of focal epilepsy s/p left temporal resection (2008) and a VNS (2019)  - Current seizure frequency is around one focal event with preserved awareness about every 10-14 days though reports he did have a seizure w/ impaired awareness last week that occurred at work, unsure how long it lasted though believes it lasted longer than his other events   - No missed doses of medication, no other identifiable trigger of this larger event although he mentions he did have a sinus infection at the time   - He is on a current medication regimen of lacosamide 200mg BID, clobazam 20mg BID, perampanel 6mg daily, and lamotrigine 600mg BID with no reported side effects  - Tolerating current VNS settings well  - Has questions and would like further clarification regarding neurocognitive testing results   - Otherwise, no fever, no cold symptoms, no headache, no changes in vision, no new weakness, no chest pain, no shortness of breath, no nausea, no vomiting, no diarrhea, no constipation, no tingling/numbness, no problems walking    Prior note:   Presents with father   11/18/21 (not clear), 11/27/21   Dec 3 and 9 - event of 10-15 sec dizziness   Episodes of easily irritability at work   Pt is a poor historian     Prior note:   Sz log:   Oct 29 - GTC   Oct 30 - GTC (despite taking ativan 2 hrs before) trigger - anxiety, nervous   Today had a bout of dizziness and imbalance - this is a rare occurrence about 1-2 hrs after taking meds      Pt of Dr. Chan  Dizziness much improved    Last sz - 9/22/21 -   Semiology re-surgery - several thoughts race through mind, limb jerking    Semiology after surgery - distracted by thoughts, less jerking      Seizure log since last visit: none  - last sz: 1/19/2021  - possible sz 4/28 ?unsure   - mostly dizziness is the issue     Current AEDs:   -  mg bid  (200mg 1 1/2 tabs bid)   - PER 6 mg q  day (2mg 3 tabs q AM)   - CLOB 40 mg bid (20mg 2 tabs bid) - suggested increase at last visit 50mg bid - but patient decreased to 30mg bid due to dizziness  PCP decreased 20mg-30mg - per pt and his father, the change has decreased the dizziness over the past week  - LTG 600mg bid (200mg 3 tabs bid)  - VNS      Results for GEORGETTE MONCADA JR. (MRN [id number]) as of 5/31/2021 08:29    Ref. Range 4/24/2019 14:03 5/6/2020 11:44 7/31/2020 10:06 5/14/2021 11:55   Clobazam Latest Ref Range: 30 - 300 ng/mL 916.0 (H) 849.0 (H) 805.0 (H) 859.0 (H)   Lacosamide Latest Ref Range: 1.0 - 10.0 mcg/mL 7.5 10.2 (H) 10.5 (H) 11.1 (H)   Lamotrigine Lvl Latest Ref Range: 2.0 - 15.0 ug/mL 13.2 16.1 (H) 14.5 14.1   Perampanel (Fycompa) Quant Latest Units: ng/mL       140   Desmethylclobazam Latest Ref Range: 300 - 3000 ng/mL 6180.0 (H) 7820.0 (H) 6160.0 (H) 5810.0 (H)         Notes from last clinic visit, 3/11/2021:  Seizure log since last visit:  1/19  12/2  10/12      Results for GEORGETTE MONCADA JR. (MRN [id number]) as of 3/11/2021 11:29    Ref. Range 7/13/2017 12:55 4/24/2019 14:03 5/6/2020 11:44 7/31/2020 10:06   Lacosamide Latest Ref Range: 1.0 - 10.0 mcg/mL 6.8 7.5 10.2 (H) 10.5 (H)   Lamotrigine Lvl Latest Ref Range: 2.0 - 15.0 ug/mL 12.6 13.2 16.1 (H) 14.5   Perampanel (Fycompa) Quant Latest Units: ng/mL 160         Clobazam Latest Ref Range: 30 - 300 ng/mL 894 (H) 916.0 (H) 849.0 (H) 805.0 (H)   Desmethylclobazam Latest Ref Range: 300 - 3000 ng/mL 5904 (H) 6180.0 (H) 7820.0 (H) 6160.0 (H)      Current AEDs:   - LCS 300mg bid  (200mg 1 1/2 tabs bid)  - PER 6 mg q day (2mg 3 tabs q AM)   - CLOB 40 mg bid (20mg 2 tabs bid)  - LTG 600mg bid (200mg 3 tabs bid)  - VNS   Model # AspireSR M106  Implant Date: 2/21/2019  Is Device palpable in chest wall                     Yes  Side of implant                                                L                                                       Initial               Reprogram    Output current             mA      0.675               0.675  Signal Freq                 Hz        30                    30  Pulse width                 uSec    750                  1000    Signal on time             Sec      30                    30  Signal off time             Min      10.0                 10.0    Autostimulation (AS)  Output Current            mA       0.75                 0.75  Pulse width                 uSec    750                  1000  Signal on time             Sec      60    Magnet settings  Output current             mA       0.75                 0.75  Pulse width                 uSec    750                  1000  Signal on time             Sec      60                    60     Tachycardia detect                 On/Off              On/off  Threshhold for AS       %                                         Seizure hx:   - onset at age 8 years     - RUE and head jerking movements -> GTC sz   - no hx of tongue/cheek bite or bowel incontinence; infrequently bladder incontinence  - epilepsy surgery done at [facility], [place] in 2008 - possibly less frequent per father; patient feels that he is more aware of it coming on and is related to triggers  - VNS placed in 2019 - did not make any difference per father     Notes from last clinic visit with , 8/12/2020:  Patient may have had a seizure on Jun 30.  He had a typical warning, swiped the VNS and did not lose awareness.  That is the only symptoms he experienced.  He is not experiencing any AEs from the meds. He does not have any other medical issues.    2020/05/14  He has noticed intermittent dizziness particularily with change in posture.  Dad fell his comprehension is not as good.  He reports two seizures - 5 days ago after missed AM dose.  The second occurred at work when he went to see his supervisor. 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  Starting 2-3 weeks after last visit he began to have the  intermittyent dizziness.  Lamictal level is high enough that there may be a pharmacodynamic interaction with lacosamide.  2019/12/12  Since the last visit he had only two seizures and he retained awareness with both.  He has been taking ativan when he goes to football games.  Lamictal levels have been stable and the clearance calculated from the dose/level indicates he is a rapid metabolizer.    2019/08/29  Patient reports three sz since last visit.  All have been partial with no convulsions.  He is tolerating the VNS well.  He has not been using the magnet to block seizures..  He is tolerating the medication well.  In the past he would usually have a seizure when he went to a game.  I have given him 1 mg ativan to take 1 hrs before a game and  This has worked well in preventing seizures.    2019/04/24  Doing well since last visit, no reported seizures. Tolerated initial VNS settings well. Returns today for continued VNS adjustment. Medication regimen unchanged.   2019/03/27  VNS implanted 2/21/19, tolerated procedure well. He was seen by NSGY in a virtual visit 3/7/19 for his 2 week post-op appointment, incision clean/dry/intact and well approximated at that time, and on exam today. Patient/family report no recent seizures. Continues to take Lacosamide, Clobazam, Lamotrigine, and Perampanel as prescribed. Presents today for first VNS adjustment.   2018/09/13  Patient reports 3 mild CPS since the last visit.  Last visit I gave him a schedule to increase lacosamide in two steps to 200 mg 1½ BID.  When he reached this dose he became dizzy and reduced the morning dose to 1 tab with resolution of the dizzinesss.  Last visit I discussed VNS treatment and today the patient wants to proceed with implanting the device.  He has no other medical problems except allergies.  He does take loratadine which works on the H1 reception similar to Benadryl but has not been been implicated in lowering seizure threshhold.     2018/01/26  The patient reports 3 sz since the last visit.  The most recent may have resulted from missed dose.  No triggers evident for the other 2 sz.  The patient reports he has an aura but can not describe what he perceives.    2017/07/13  In the past 6 months the patient has experienced five seizures (about 1 per month).   The patient was having difficulty expressing himself - speech is halting and disconnected.  He has been taking ativan prior to social event or watching sports to prevent seizure.  He has been taking ativan about 2 times a month.    2016/04/19 and 2017/01/23              Since the last visit the patient has experienced one seizures (12 Jan 2017) .  Meds were increased last time and he is tolerating the new dosages well.  There was no obvious provocative reason.  He has a good strategy for compliance.  He had no concurrent medical problem and had not taken benadryl or other proconvulsive medication.    pt has had the following episodes:  2015/4/8 - Pt had just finished a walk.  Eye blinking and stereotypic hand movements.  No generalized convulsions were noted. Didn't lose consciousness.  Lasted 30s to 1 minute.  2015/6/6 - Pt was in his room.  Came to his dad to give him his phone to record what was happening.  Said he has the feeling that he's about to have a seizure.  Says there is no aura per se.  Says a thought that he's about to have a seizure pops into his head, which usually precedes his events.  Said his speech got markedly worse during the event.  Eye blinking lasted 30s to 1 minute and resolved spontaneously.  Also had stereotypic hand movements and eye blinking.  Maintained consciousness throughout episode.  Pt had no memory of the seizure, which was relayed by his dad.     2015/6/8 - Pt was running around a track.  Said he overexerted himself over the last three laps.  Said he had another event immediately after exercise.  Again, this episode was brief (30s-1min) and was  characterized by walking in circles, eye blinking, and stereotypic hand motions of rubbing his fingers. Had no intraictal recall.       Previous History (2015/4/1)              The patient was referred for consultation by Dr. Guerrero due to want to transfer care here due to long distance, patient is living [facility].  The patient was accompanied by his mother and father who provided some of the history.       Seizure surgery 2008 for epilepsy. Since then, less frequent and not as strong, still have seizure twice a month with current medication dosage. He is compliant with medication, tolerates medication well, no side effect noticed. On current regime for about 5 years, change to onfi 2-3 years ago in place of keppra.         Seizure Seminology  Seizure Type 1  Classification:   Aura - feel it is coming, his mind told him that seizure is coming  Ictus  - Nonconv -  - Conv - whole body jerking more on the right side, after a couple of jerk, he is out, first started has tongue biting and incontinence  - Duration - 2 minutes  Post-ictal  - Symptoms: can not talk, sleepiness no headache  - Duration: 5-10 minutes  Age of onset : 6 year old  Current Seizure Frequency -  Initially once week,  Last Seizure: 10 years ago     Seizure Type 2: after seizure  Classification:   Aura - feel it is coming, but not as strong as before  Ictus  - Nonconv -  - Conv - blinking and body jerk more on the arm.   - Duration - 30 sec  Post-ictal  - Symptoms: no sleepiness  - Duration: back to baseline after ictus right away  Age of onset : 29  Current Seizure Frequency -  Last Seizure: a couple weeks ago  Triggers: stress, missed meds, computer use, overexertion     Any other relevant information:  - none     PREVIOUS EVALUATIONS:    Previous EEGs:   - per 's notes, C-EEG in 2014: 'not abnormal'     Previous MRIs:  - per 's notes, MRI in 2015: 'no new changes'     Additional tests:  1)CT Scan:   2) EEG\Video Monitoring: no  3)  PET Scan: no  4) Neuropsychological evaluation: no  5) DEXA Scan: no  6) Others: no     RISK FACTORS FOR SEIZURES:    1. Head Trauma:  No    2. CNS Infections:  No  3. CNS Tumors: No     4. CNS Vascular Disease: No     5. Febrile Seizures: No    6. Developmental Delay: No       7. Family History of Seizures: No    8. Birth history: FTNVD     Pregnancy/Labor/Delivery: n/a     CURRENT MEDICATIONS:   Current Medications               Current Outpatient Medications   Medication Sig Dispense Refill    cloBAZam (ONFI) 20 mg Tab TAKE TWO TABLETS BY MOUTH TWICE DAILY. GENERIC EQUIVALENT TO ONFI. 360 tablet 2    FLUARIX QUAD 6102-5614, PF, 60 mcg (15 mcg x 4)/0.5 mL Syrg vaccine     0    lacosamide (VIMPAT) 200 mg Tab tablet Take 1.5 tablets (300 mg total) by mouth every 12 (twelve) hours. 270 tablet 2    lamoTRIgine (LAMICTAL) 200 MG tablet Take 3 tablets (600 mg total) by mouth 2 (two) times daily. 540 tablet 2    LORazepam (ATIVAN) 1 MG tablet Take 1 tablet (1 mg total) by mouth as needed (seizure). 5 tablet 0    multivitamin (THERAGRAN) per tablet Take 1 tablet by mouth once daily.        perampaneL (FYCOMPA) 2 mg tablet Take 3 tablets (6 mg total) by mouth once daily. 270 tablet 2      No current facility-administered medications for this visit.         Folic acid: no     CURRENT ANTI EPILEPTIC MEDICATIONS:  See hpi     VAGAL NERVE STIMULATOR:     VNS interrogated 1/18/2023. Battery life 50-75%. No changes made. Settings as listed below.    9/23/2022  Model #     AspireSR M106 S/N [id number]  Implant Date    02/21/2019  Is Device palpable in chest wall  Yes   Side of implant    L           Initial  Reprogram   Output current  mA 0.75  0.875  Signal Freq          Hz 30  30  Pulse width         uSec 500  500  Signal on time      Sec 60  30  Signal off time      Min 5.0  5.0    Magnet settings  Output current          mA 1.0  1.125  Pulse width         uSec 500  500  Signal on time         Sec 60  60    Autostimulation  (AS)  Output Current          mA 0.875  1.0  Pulse width         uSec 500  500  Signal on time         Sec 30  30  Tachycardia detect  On  On      VNS was interrogated today (4/7/2022) and the following changes were made:     Model # AspireSR M106  Implant Date: 2/21/2019  Is Device palpable in chest wall                     Yes  Side of implant                                                L                                                             Prior settings:  Output current             mA       0.675               Signal Freq                 Hz        30                      Pulse width                 uSec    500               Signal on time             Sec      30                     Signal off time             Min      5.0                   Duty cycle 10%    Autostimulation (AS)  Output Current            mA       0.75                  Pulse width                 uSec    500                   Signal on time             Sec      30    Magnet settings  Output current             mA       0.75                  Pulse width                 uSec    500                    Signal on time             Sec      60                       Tachycardia detect     On/Off                Threshhold for AS       %                                      Lead impedence 3289 Ohms  Generator battery: 50-75 %     New settings:                                                         Output current             mA       0.75               Signal Freq                 Hz        30                      Pulse width                 uSec    500               Signal on time             Sec      60                     Signal off time             Min      5.0                   Duty cycle 18%    Autostimulation (AS)  Output Current            mA       0.875                  Pulse width                 uSec    500                   Signal on time             Sec      30    Magnet settings  Output current             mA       1.0                   Pulse width                 uSec    500                    Signal on time             Sec      60                       Tachycardia detect     On/Off                Threshhold for AS       %                                      Generator battery: 50-75%     PRIOR ANTICONVULSANT HISTORY:   carbamazepine (Tegretol, CBZ):        tried in the past  ethosuximide (Zarontin, ESM):           tried in the past  felbamate (felbatol, FBM):                  tried in the past  gabapentin (Neurontin, GPN):            tried in the past  levetiracetam (Keppra, LEV):             tried in the past  phenytoin (Dilantin, PHT):                  tried in the past  primidone (Mysoline, PRM):               tried in the past  topiramate (Topamax, TPM):             tried in the past  valproic acid (Depakote, VPA):          tried in the past   clorazepate (Tranxene, CLZ):            Stopped previous visits.       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    Environmental and seasonal allergies 09/13/2018    Epilepsy 02/21/2019    S/P placement of VNS (vagus nerve stimulation) device 03/27/2019              Resolved Ambulatory Problems     Diagnosis Date Noted    No Resolved Ambulatory Problems      No Additional Past Medical History      PAST PSYCHIATRIC HISTORY:  no     PAST SURGICAL HISTORY including Epilepsy surgery:             Past Surgical History:   Procedure Laterality Date    epilepsy surgery Left 2008    VAGUS NERVE STIMULATOR INSERTION Left 2/21/2019     Procedure: INSERTION, VAGUS NERVE STIMULATOR;  Surgeon: Teddy Saldana MD;  Location: [facility] OR [address] [place] [address];  Service: Neurosurgery;  Laterality: Left;  toronto II, asa 2, regular bed, supine,      FAMILY HISTORY:             Family History    Problem Relation Age of Onset    Diabetes Father      Cancer Father      Diabetes Sister      Cancer Paternal Aunt      Diabetes Paternal Uncle      Cancer Paternal Grandmother      Diabetes Paternal Grandfather      Cancer Paternal Grandfather         SOCIAL HISTORY:               Social History                   Socioeconomic History    Marital status: Single       Spouse name: Not on file    Number of children: Not on file    Years of education: Not on file    Highest education level: Not on file   Occupational History    Not on file   Tobacco Use    Smoking status: Never Smoker   Substance and Sexual Activity    Alcohol use: No    Drug use: No    Sexual activity: Not on file   Other Topics Concern    Not on file   Social History Narrative    Not on file      Social Determinants of Health            Financial Resource Strain:     Difficulty of Paying Living Expenses:    Food Insecurity:     Worried About Running Out of Food in the Last Year:     Ran Out of Food in the Last Year:    Transportation Needs:     Lack of Transportation (Medical):     Lack of Transportation (Non-Medical):    Physical Activity:     Days of Exercise per Week:     Minutes of Exercise per Session:    Stress:     Feeling of Stress :    Social Connections:     Frequency of Communication with Friends and Family:     Frequency of Social Gatherings with Friends and Family:     Attends L[employer] Services:     Active Member of Clubs or Organizations:     Attends Club or Organization Meetings:     Marital Status:             a) Marital status: Single                                                    b) Living situation: patient lives with parents  c) Employed/Unemployed/Other: Employed full time  - works in a [employer] [place]     DRIVING HISTORY:  Currently driving: No       LEVEL OF EDUCATION: Masters in Industrial education     SUBSTANCE USE: none     ALLERGIES: Patient has no known allergies.      REVIEW OF SYSTEMS:  Review  of Systems     PHYSICAL EXAM:  - General: Awake, cooperative, NAD.  - HEENT: NC/AT  - Neck: Supple  - Pulmonary: no increased WOB  - Cardiac: well perfused   - Abdomen: soft, nontender, nondistended  - Extremities: no edema  - Skin: no rashes or lesions noted     NEURO EXAM:   - Mental Status: Awake, alert, oriented x 3. Attentive to examiner. Language is hesitant with intact repetition. Fair comprehension. Normal prosody. There were no paraphasic errors. Able to name both high and low frequency objects. Speech was not dysarthric. Able to follow both midline and appendicular commands. There was no evidence of apraxia or neglect.     - Cranial Nerves:   EOMI. No facial droop. Hearing intact to room voice. 5/5 strength in trapezii. Tongue protrudes in midline and to either side with no evidence of atrophy or weakness.     - Motor: Normal bulk and tone throughout. No pronator drift bilaterally. No adventitious movements such as tremor or asterixis noted.   - Sensory: No deficits to light touch.   - Coordination: No dysmetria on FNF  - Gait: Good initiation. Narrow-based, normal stride and arm swing.       IMPRESSION:   Focal epilepsy, onset at age 8 years  intractable s/p epi surgery at [facility] in [place] (left temporal resection, 2008) and VNS (2019)  Speech fluency impaired   Episodes of brief dizziness - forced displacement? He gets irritable after the event   Feels irritable when overstimulated by people around him talking      Plan:   - continue lacosamide 200mg BID  - continue perampanel 6mg qhs  - continue clobazam 20mg BID   - continue lamotrigine 600mg BID    - levels and will adjust current regimen pending results   - VNS interrogated today with no changes made   - Patient's primary concern today is his mood. Has seen some benefit w/ sertraline -> increase to 100mg daily.   - close follow up in 4 weeks to discuss ASM adjustments and assess change in sertraline   - continue to follow up w/ psychiatry      Patient and family are comfortable with plan and verbalizes agreement. All questions and concerns are addressed at this time.      Ashely Peoples PA-C   Neurology-Epilepsy  [facility]    Collaborating physician, Dr. Harvey Roberts, was available during today's encounter.     I spent approximately 70 minutes on the day of this encounter preparing to see the patient, obtaining and reviewing history and results, performing a medically appropriate exam, counseling and educating the patient/family/caregiver, documenting clinical information, coordinating care, and ordering medications, tests, procedures, and referrals.

## 2023-01-18 NOTE — PATIENT INSTRUCTIONS
[facility] because of your seizure disorder. Your seizures are partially controlled on lacosamide 200mg twice daily, clobazam 20mg twice daily, perampanel 6mg daily, lamotrigine 600mg twice daily. Please continue the same medications at the same dose for now.     Do not miss any doses of medication. If a dose of medication is missed, take it as soon as it is remembered even if that means doubling up on the dose. Please get a lab test to check out the blood level of medication. We will follow up in 4 weeks to discuss possible medication changes.     Get regular sleep. Go to sleep at the same time and wake up at the same time every day. People with epilepsy require more sleep than people without epilepsy.  Sleeping 10-12 hours a day can be normal for a person with epilepsy.      We will increase the sertraline to 100mg daily to help with stress.     I will review the report from Debra Sanchez and send you a message over the portal. We can also discuss it during the virtual visit in 4 weeks.     Per [place] law, no episodes of loss of consciousness for 6 months before driving.  Avoid dangerous situations.  For example, no baths/pools alone, no heights, no power tools.  Wear a bike helmet.  If breakthrough seizures occur that are different in character, frequency, or duration from normal episodes, please patient portal me or call the office and we will decide the next steps. If multiple seizures occur in a row without return back to baseline, 911 needs to be called.     Return to clinic in 4 weeks (virtual visit) or sooner with issues.  Please patient portal with any questions or concerns.    Ashely Peoples PA-C   Neurology-Epilepsy  [facility]

## 2023-01-19 LAB
CLOBAZAM SERPL-MCNC: 537 NG/ML (ref 30–300)
NORCLOBAZAM SERPL-MCNC: 4710 NG/ML (ref 300–3000)

## 2023-01-20 LAB
LACOSAMIDE: 8.5 MCG/ML (ref 1–10)
LAMOTRIGINE SERPL-MCNC: 15.7 UG/ML (ref 2–15)
PERAMPANEL SERPL-MCNC: 153 NG/ML (ref 180–980)

## 2023-02-02 DIAGNOSIS — G40.219 LOCALIZATION-RELATED SYMPTOMATIC EPILEPSY AND EPILEPTIC SYNDROMES WITH COMPLEX PARTIAL SEIZURES, INTRACTABLE, WITHOUT STATUS EPILEPTICUS: ICD-10-CM

## 2023-02-02 RX ORDER — LAMOTRIGINE 200 MG/1
600 TABLET ORAL 2 TIMES DAILY
Qty: 540 TABLET | Refills: 3 | Status: SHIPPED | OUTPATIENT
Start: 2023-02-02 | End: 2024-03-20 | Stop reason: SDUPTHER

## 2023-02-02 RX ORDER — CLOBAZAM 20 MG/1
20 TABLET ORAL 2 TIMES DAILY
Qty: 60 TABLET | Refills: 5 | Status: SHIPPED | OUTPATIENT
Start: 2023-02-02 | End: 2023-02-07 | Stop reason: SDUPTHER

## 2023-02-02 RX ORDER — LACOSAMIDE 200 MG/1
200 TABLET ORAL EVERY 12 HOURS
Qty: 60 TABLET | Refills: 5 | Status: SHIPPED | OUTPATIENT
Start: 2023-02-02 | End: 2023-10-12

## 2023-02-07 DIAGNOSIS — G40.219 LOCALIZATION-RELATED SYMPTOMATIC EPILEPSY AND EPILEPTIC SYNDROMES WITH COMPLEX PARTIAL SEIZURES, INTRACTABLE, WITHOUT STATUS EPILEPTICUS: ICD-10-CM

## 2023-02-07 RX ORDER — CLOBAZAM 20 MG/1
20 TABLET ORAL 2 TIMES DAILY
Qty: 60 TABLET | Refills: 5 | Status: SHIPPED | OUTPATIENT
Start: 2023-02-07 | End: 2023-02-17 | Stop reason: SDUPTHER

## 2023-02-09 ENCOUNTER — TELEPHONE (OUTPATIENT)
Dept: NEUROLOGY | Facility: CLINIC | Age: 48
End: 2023-02-09
Payer: COMMERCIAL

## 2023-02-09 NOTE — TELEPHONE ENCOUNTER
----- Message from Ashely Peoples PA-C sent at 2/9/2023 10:37 AM CST -----  Contact: Nicholas reddy [phone]    ----- Message -----  From: Belia Isaac  Sent: 2/9/2023   9:46 AM CST  To: Shel[facility] Staff    [email or web address]     Patient is calling for Medical Advice regarding:    How long has patient had these symptoms:    Pharmacy name and phone#:    Would like response via [employer]:call back    Comments: Pt is requesting a call back from the nurse because he would like to change his virtual appt. The pt will be in the State if [place] @ the time of the appt and it stated that the provider is not licensed in the state of [place]

## 2023-02-17 DIAGNOSIS — G40.219 LOCALIZATION-RELATED SYMPTOMATIC EPILEPSY AND EPILEPTIC SYNDROMES WITH COMPLEX PARTIAL SEIZURES, INTRACTABLE, WITHOUT STATUS EPILEPTICUS: ICD-10-CM

## 2023-02-17 RX ORDER — CLOBAZAM 20 MG/1
20 TABLET ORAL 2 TIMES DAILY
Qty: 180 TABLET | Refills: 0 | Status: SHIPPED | OUTPATIENT
Start: 2023-02-17 | End: 2023-05-05 | Stop reason: SDUPTHER

## 2023-02-28 ENCOUNTER — OFFICE VISIT (OUTPATIENT)
Dept: NEUROLOGY | Facility: CLINIC | Age: 48
End: 2023-02-28
Payer: COMMERCIAL

## 2023-02-28 ENCOUNTER — PATIENT MESSAGE (OUTPATIENT)
Dept: NEUROLOGY | Facility: CLINIC | Age: 48
End: 2023-02-28

## 2023-02-28 DIAGNOSIS — F06.30 MOOD DISORDER DUE TO MEDICAL CONDITION: ICD-10-CM

## 2023-02-28 DIAGNOSIS — Z96.89 S/P PLACEMENT OF VNS (VAGUS NERVE STIMULATION) DEVICE: ICD-10-CM

## 2023-02-28 DIAGNOSIS — G40.219 LOCALIZATION-RELATED SYMPTOMATIC EPILEPSY AND EPILEPTIC SYNDROMES WITH COMPLEX PARTIAL SEIZURES, INTRACTABLE, WITHOUT STATUS EPILEPTICUS: Primary | ICD-10-CM

## 2023-02-28 PROCEDURE — 99215 OFFICE O/P EST HI 40 MIN: CPT | Mod: 95,,,

## 2023-02-28 PROCEDURE — 99215 PR OFFICE/OUTPT VISIT, EST, LEVL V, 40-54 MIN: ICD-10-PCS | Mod: 95,,,

## 2023-02-28 NOTE — PATIENT INSTRUCTIONS
[facility] because of your seizure disorder. Your seizures are mostly controlled on lacosamide 200mg twice daily, clobazam 20mg twice daily, perampanel 6mg daily, lamotrigine 600mg twice daily. Please continue the same medications at the same dose for now.      Do not miss any doses of medication. If a dose of medication is missed, take it as soon as it is remembered even if that means doubling up on the dose.      Get regular sleep. Go to sleep at the same time and wake up at the same time every day. People with epilepsy require more sleep than people without epilepsy.  Sleeping 10-12 hours a day can be normal for a person with epilepsy.      We may increase the VNS at your next visit.      Per [place] law, no episodes of loss of consciousness for 6 months before driving.  Avoid dangerous situations.  For example, no baths/pools alone, no heights, no power tools.  Wear a bike helmet.  If breakthrough seizures occur that are different in character, frequency, or duration from normal episodes, please patient portal me or call the office and we will decide the next steps. If multiple seizures occur in a row without return back to baseline, 911 needs to be called.      Return to clinic in 3 months or sooner with issues.  Please patient portal with any questions or concerns.     Ashely Peoples PA-C   Neurology-Epilepsy  [facility]

## 2023-02-28 NOTE — PROGRESS NOTES
CC: Epilepsy    Interval Events/ROS 2/28/2023:    Current ASM/SEs: lacosamide 200mg BID, clobazam 20mg BID, perampanel 6mg daily, lamotrigine 600mg BID, and a VNS  Breakthrough seizures/events: no seizures since last visit   Driving: no  Sleep: good   Mood: started sertraline 100mg four weeks ago     Otherwise, no fever, no cold symptoms, no headache, no changes in vision, no new weakness, no chest pain, no shortness of breath, no nausea, no vomiting, no diarrhea, no constipation, no tingling/numbness, no problems walking.    Recent Labs   Lab 05/06/20  1144 07/31/20  1006 05/14/21  1155 04/07/22  1445 01/18/23  1207   Lamotrigine Lvl 16.1 H 14.5 14.1 15.9 H 15.7 H   Lacosamide 10.2 H 10.5 H 11.1 H 7.7 8.5   Clobazam 849.0 H 805.0 H 859.0 H 438.0 H 537.0 H   Desmethylclobazam 7820.0 H 6160.0 H 5810.0 H 3050.0 H 4710.0 H   Perampanel (Fycompa) Quant  --   --  140  --  153 L      Interval Events/ROS 1/18/2023:    Accompanied by parents who also contribute to the history.     Current ASM/SEs: lacosamide 200mg BID, clobazam 20mg BID, perampanel 6mg daily, lamotrigine 600mg BID, and a VNS; SE anger/irritability  Breakthrough seizures/events: 7 seizures in the past 4 months, no identifiable trigger  Driving: no  Sleep: not great  Mood: anxious, angry, lonely; notes mild improvement w/ sertraline 50mg daily    Chronic memory issues. Otherwise, no fever, no cold symptoms, no headache, no changes in vision, no new weakness, no chest pain, no shortness of breath, no nausea, no vomiting, no diarrhea, no constipation, no tingling/numbness, no problems walking.    Recent Labs   Lab 05/06/20  1144 07/31/20  1006 05/14/21  1155 04/07/22  1445 01/18/23  1207   Lamotrigine Lvl 16.1 H 14.5 14.1 15.9 H 15.7 H   Lacosamide 10.2 H 10.5 H 11.1 H 7.7 8.5   Clobazam 849.0 H 805.0 H 859.0 H 438.0 H 537.0 H   Desmethylclobazam 7820.0 H 6160.0 H 5810.0 H 3050.0 H 4710.0 H   Perampanel (Fycompa) Quant  --   --  140  --  153 L       Interval  Events/ROS 9/23/2022:    Current ASM/SEs: lacosamide 200mg BID, clobazam 20mg BID, perampanel 6mg daily, lamotrigine 600mg BID, and a VNS; SE anger   Breakthrough seizures/events: current frequency is around one per month; last event was 9/17/2022   Driving: no  Sleep: not well; up late w/ ruminating thoughts/mind racing  Mood: anxious, angry, lonely    Otherwise, no fever, no cold symptoms, no headache, no changes in vision, no new weakness, no chest pain, no shortness of breath, no nausea, no vomiting, no diarrhea, no constipation, no tingling/numbness, no problems walking.    Recent Labs   Lab 05/06/20  1144 07/31/20  1006 05/14/21  1155 04/07/22  1445 01/18/23  1207   Lamotrigine Lvl 16.1 H 14.5 14.1 15.9 H 15.7 H   Lacosamide 10.2 H 10.5 H 11.1 H 7.7 8.5   Clobazam 849.0 H 805.0 H 859.0 H 438.0 H 537.0 H   Desmethylclobazam 7820.0 H 6160.0 H 5810.0 H 3050.0 H 4710.0 H   Perampanel (Fycompa) Quant  --   --  140  --  153 L      Interval Events/ROS 4/7/2022:  - Presents with parents who also contribute to the history  - Patient is a 45 y/o man with a hx of focal epilepsy s/p left temporal resection (2008) and a VNS (2019)  - Current seizure frequency is around one focal event with preserved awareness about every 10-14 days though reports he did have a seizure w/ impaired awareness last week that occurred at work, unsure how long it lasted though believes it lasted longer than his other events   - No missed doses of medication, no other identifiable trigger of this larger event although he mentions he did have a sinus infection at the time   - He is on a current medication regimen of lacosamide 200mg BID, clobazam 20mg BID, perampanel 6mg daily, and lamotrigine 600mg BID with no reported side effects  - Tolerating current VNS settings well  - Has questions and would like further clarification regarding neurocognitive testing results   - Otherwise, no fever, no cold symptoms, no headache, no changes in vision, no  new weakness, no chest pain, no shortness of breath, no nausea, no vomiting, no diarrhea, no constipation, no tingling/numbness, no problems walking    Prior note:   Presents with father   11/18/21 (not clear), 11/27/21   Dec 3 and 9 - event of 10-15 sec dizziness   Episodes of easily irritability at work   Pt is a poor historian     Prior note:   Sz log:   Oct 29 - GTC   Oct 30 - GTC (despite taking ativan 2 hrs before) trigger - anxiety, nervous   Today had a bout of dizziness and imbalance - this is a rare occurrence about 1-2 hrs after taking meds      Pt of Dr. Chan  Dizziness much improved    Last sz - 9/22/21 -   Semiology re-surgery - several thoughts race through mind, limb jerking    Semiology after surgery - distracted by thoughts, less jerking      Seizure log since last visit: none  - last sz: 1/19/2021  - possible sz 4/28 ?unsure   - mostly dizziness is the issue     Current AEDs:   -  mg bid  (200mg 1 1/2 tabs bid)   - PER 6 mg q day (2mg 3 tabs q AM)   - CLOB 40 mg bid (20mg 2 tabs bid) - suggested increase at last visit 50mg bid - but patient decreased to 30mg bid due to dizziness  PCP decreased 20mg-30mg - per pt and his father, the change has decreased the dizziness over the past week  - LTG 600mg bid (200mg 3 tabs bid)  - VNS      Results for GEORGETTE MONCADA JR. (MRN [id number]) as of 5/31/2021 08:29    Ref. Range 4/24/2019 14:03 5/6/2020 11:44 7/31/2020 10:06 5/14/2021 11:55   Clobazam Latest Ref Range: 30 - 300 ng/mL 916.0 (H) 849.0 (H) 805.0 (H) 859.0 (H)   Lacosamide Latest Ref Range: 1.0 - 10.0 mcg/mL 7.5 10.2 (H) 10.5 (H) 11.1 (H)   Lamotrigine Lvl Latest Ref Range: 2.0 - 15.0 ug/mL 13.2 16.1 (H) 14.5 14.1   Perampanel (Fycompa) Quant Latest Units: ng/mL       140   Desmethylclobazam Latest Ref Range: 300 - 3000 ng/mL 6180.0 (H) 7820.0 (H) 6160.0 (H) 5810.0 (H)         Notes from last clinic visit, 3/11/2021:  Seizure log since last visit:  1/19  12/2  10/12      Results for  GEORGETTE MONCADA JR. (MRN [id number]) as of 3/11/2021 11:29    Ref. Range 7/13/2017 12:55 4/24/2019 14:03 5/6/2020 11:44 7/31/2020 10:06   Lacosamide Latest Ref Range: 1.0 - 10.0 mcg/mL 6.8 7.5 10.2 (H) 10.5 (H)   Lamotrigine Lvl Latest Ref Range: 2.0 - 15.0 ug/mL 12.6 13.2 16.1 (H) 14.5   Perampanel (Fycompa) Quant Latest Units: ng/mL 160         Clobazam Latest Ref Range: 30 - 300 ng/mL 894 (H) 916.0 (H) 849.0 (H) 805.0 (H)   Desmethylclobazam Latest Ref Range: 300 - 3000 ng/mL 5904 (H) 6180.0 (H) 7820.0 (H) 6160.0 (H)      Current AEDs:   - LCS 300mg bid  (200mg 1 1/2 tabs bid)  - PER 6 mg q day (2mg 3 tabs q AM)   - CLOB 40 mg bid (20mg 2 tabs bid)  - LTG 600mg bid (200mg 3 tabs bid)  - VNS   Model # AspireSR M106  Implant Date: 2/21/2019  Is Device palpable in chest wall                     Yes  Side of implant                                                L                                                       Initial               Reprogram   Output current             mA      0.675               0.675  Signal Freq                 Hz        30                    30  Pulse width                 uSec    750                  1000    Signal on time             Sec      30                    30  Signal off time             Min      10.0                 10.0    Autostimulation (AS)  Output Current            mA       0.75                 0.75  Pulse width                 uSec    750                  1000  Signal on time             Sec      60    Magnet settings  Output current             mA       0.75                 0.75  Pulse width                 uSec    750                  1000  Signal on time             Sec      60                    60     Tachycardia detect                 On/Off              On/off  Threshhold for AS       %                                         Seizure hx:   - onset at age 8 years     - RUE and head jerking movements -> GTC sz   - no hx of tongue/cheek bite or bowel  incontinence; infrequently bladder incontinence  - epilepsy surgery done at [facility], [place] in 2008 - possibly less frequent per father; patient feels that he is more aware of it coming on and is related to triggers  - VNS placed in 2019 - did not make any difference per father     Notes from last clinic visit with , 8/12/2020:  Patient may have had a seizure on Jun 30.  He had a typical warning, swiped the VNS and did not lose awareness.  That is the only symptoms he experienced.  He is not experiencing any AEs from the meds. He does not have any other medical issues.    2020/05/14  He has noticed intermittent dizziness particularily with change in posture.  Dad fell his comprehension is not as good.  He reports two seizures - 5 days ago after missed AM dose.  The second occurred at work when he went to see his supervisor. 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  Starting 2-3 weeks after last visit he began to have the intermittyent dizziness.  Lamictal level is high enough that there may be a pharmacodynamic interaction with lacosamide.  2019/12/12  Since the last visit he had only two seizures and he retained awareness with both.  He has been taking ativan when he goes to football games.  Lamictal levels have been stable and the clearance calculated from the dose/level indicates he is a rapid metabolizer.    2019/08/29  Patient reports three sz since last visit.  All have been partial with no convulsions.  He is tolerating the VNS well.  He has not been using the magnet to block seizures..  He is tolerating the medication well.  In the past he would usually have a seizure when he went to a game.  I have given him 1 mg ativan to take 1 hrs before a game and  This has worked well in preventing seizures.    2019/04/24  Doing well since last visit, no reported seizures. Tolerated initial VNS settings well. Returns today for continued VNS adjustment. Medication regimen unchanged.    2019/03/27  VNS implanted 2/21/19, tolerated procedure well. He was seen by NSGY in a virtual visit 3/7/19 for his 2 week post-op appointment, incision clean/dry/intact and well approximated at that time, and on exam today. Patient/family report no recent seizures. Continues to take Lacosamide, Clobazam, Lamotrigine, and Perampanel as prescribed. Presents today for first VNS adjustment.   2018/09/13  Patient reports 3 mild CPS since the last visit.  Last visit I gave him a schedule to increase lacosamide in two steps to 200 mg 1½ BID.  When he reached this dose he became dizzy and reduced the morning dose to 1 tab with resolution of the dizzinesss.  Last visit I discussed VNS treatment and today the patient wants to proceed with implanting the device.  He has no other medical problems except allergies.  He does take loratadine which works on the H1 reception similar to Benadryl but has not been been implicated in lowering seizure threshhold.    2018/01/26  The patient reports 3 sz since the last visit.  The most recent may have resulted from missed dose.  No triggers evident for the other 2 sz.  The patient reports he has an aura but can not describe what he perceives.    2017/07/13  In the past 6 months the patient has experienced five seizures (about 1 per month).   The patient was having difficulty expressing himself - speech is halting and disconnected.  He has been taking ativan prior to social event or watching sports to prevent seizure.  He has been taking ativan about 2 times a month.    2016/04/19 and 2017/01/23              Since the last visit the patient has experienced one seizures (12 Jan 2017) .  Meds were increased last time and he is tolerating the new dosages well.  There was no obvious provocative reason.  He has a good strategy for compliance.  He had no concurrent medical problem and had not taken benadryl or other proconvulsive medication.    pt has had the following episodes:  2015/4/8 - Pt  had just finished a walk.  Eye blinking and stereotypic hand movements.  No generalized convulsions were noted. Didn't lose consciousness.  Lasted 30s to 1 minute.  2015/6/6 - Pt was in his room.  Came to his dad to give him his phone to record what was happening.  Said he has the feeling that he's about to have a seizure.  Says there is no aura per se.  Says a thought that he's about to have a seizure pops into his head, which usually precedes his events.  Said his speech got markedly worse during the event.  Eye blinking lasted 30s to 1 minute and resolved spontaneously.  Also had stereotypic hand movements and eye blinking.  Maintained consciousness throughout episode.  Pt had no memory of the seizure, which was relayed by his dad.     2015/6/8 - Pt was running around a track.  Said he overexerted himself over the last three laps.  Said he had another event immediately after exercise.  Again, this episode was brief (30s-1min) and was characterized by walking in circles, eye blinking, and stereotypic hand motions of rubbing his fingers. Had no intraictal recall.       Previous History (2015/4/1)              The patient was referred for consultation by Dr. Guerrero due to want to transfer care here due to long distance, patient is living [facility].  The patient was accompanied by his mother and father who provided some of the history.       Seizure surgery 2008 for epilepsy. Since then, less frequent and not as strong, still have seizure twice a month with current medication dosage. He is compliant with medication, tolerates medication well, no side effect noticed. On current regime for about 5 years, change to onfi 2-3 years ago in place of keppra.         Seizure Seminology  Seizure Type 1  Classification:   Aura - feel it is coming, his mind told him that seizure is coming  Ictus  - Nonconv -  - Conv - whole body jerking more on the right side, after a couple of jerk, he is out, first started has tongue biting and  incontinence  - Duration - 2 minutes  Post-ictal  - Symptoms: can not talk, sleepiness no headache  - Duration: 5-10 minutes  Age of onset : 6 year old  Current Seizure Frequency -  Initially once week,  Last Seizure: 10 years ago     Seizure Type 2: after seizure  Classification:   Aura - feel it is coming, but not as strong as before  Ictus  - Nonconv -  - Conv - blinking and body jerk more on the arm.   - Duration - 30 sec  Post-ictal  - Symptoms: no sleepiness  - Duration: back to baseline after ictus right away  Age of onset : 29  Current Seizure Frequency -  Last Seizure: a couple weeks ago  Triggers: stress, missed meds, computer use, overexertion     Any other relevant information:  - none     PREVIOUS EVALUATIONS:    Previous EEGs:   - per 's notes, C-EEG in 2014: 'not abnormal'     Previous MRIs:  - per 's notes, MRI in 2015: 'no new changes'     Additional tests:  1)CT Scan:   2) EEG\Video Monitoring: no  3) PET Scan: no  4) Neuropsychological evaluation: no  5) DEXA Scan: no  6) Others: no     RISK FACTORS FOR SEIZURES:    1. Head Trauma:  No    2. CNS Infections:  No  3. CNS Tumors: No     4. CNS Vascular Disease: No     5. Febrile Seizures: No    6. Developmental Delay: No       7. Family History of Seizures: No    8. Birth history: FTNVD     Pregnancy/Labor/Delivery: n/a     CURRENT MEDICATIONS:   Current Medications               Current Outpatient Medications   Medication Sig Dispense Refill    cloBAZam (ONFI) 20 mg Tab TAKE TWO TABLETS BY MOUTH TWICE DAILY. GENERIC EQUIVALENT TO ONFI. 360 tablet 2    FLUARIX QUAD 8924-1594, PF, 60 mcg (15 mcg x 4)/0.5 mL Syrg vaccine     0    lacosamide (VIMPAT) 200 mg Tab tablet Take 1.5 tablets (300 mg total) by mouth every 12 (twelve) hours. 270 tablet 2    lamoTRIgine (LAMICTAL) 200 MG tablet Take 3 tablets (600 mg total) by mouth 2 (two) times daily. 540 tablet 2    LORazepam (ATIVAN) 1 MG tablet Take 1 tablet (1 mg total) by mouth as needed  (seizure). 5 tablet 0    multivitamin (THERAGRAN) per tablet Take 1 tablet by mouth once daily.        perampaneL (FYCOMPA) 2 mg tablet Take 3 tablets (6 mg total) by mouth once daily. 270 tablet 2      No current facility-administered medications for this visit.         Folic acid: no     CURRENT ANTI EPILEPTIC MEDICATIONS:  See hpi     VAGAL NERVE STIMULATOR:     VNS interrogated 1/18/2023. Battery life 50-75%. No changes made. Settings as listed below.    9/23/2022  Model #     AspireSR M106 S/N [id number]  Implant Date    02/21/2019  Is Device palpable in chest wall  Yes   Side of implant    L           Initial  Reprogram   Output current  mA 0.75  0.875  Signal Freq          Hz 30  30  Pulse width         uSec 500  500  Signal on time      Sec 60  30  Signal off time      Min 5.0  5.0    Magnet settings  Output current          mA 1.0  1.125  Pulse width         uSec 500  500  Signal on time         Sec 60  60    Autostimulation (AS)  Output Current          mA 0.875  1.0  Pulse width         uSec 500  500  Signal on time         Sec 30  30  Tachycardia detect  On  On      VNS was interrogated today (4/7/2022) and the following changes were made:     Model # AspireSR M106  Implant Date: 2/21/2019  Is Device palpable in chest wall                     Yes  Side of implant                                                L                                                             Prior settings:  Output current             mA       0.675               Signal Freq                 Hz        30                      Pulse width                 uSec    500               Signal on time             Sec      30                     Signal off time             Min      5.0                   Duty cycle 10%    Autostimulation (AS)  Output Current            mA       0.75                  Pulse width                 uSec    500                   Signal on time             Sec      30    Magnet settings  Output current              mA       0.75                  Pulse width                 uSec    500                    Signal on time             Sec      60                       Tachycardia detect     On/Off                Threshhold for AS       %                                      Lead impedence 3289 Ohms  Generator battery: 50-75 %     New settings:                                                         Output current             mA       0.75               Signal Freq                 Hz        30                      Pulse width                 uSec    500               Signal on time             Sec      60                     Signal off time             Min      5.0                   Duty cycle 18%    Autostimulation (AS)  Output Current            mA       0.875                  Pulse width                 uSec    500                   Signal on time             Sec      30    Magnet settings  Output current             mA       1.0                  Pulse width                 uSec    500                    Signal on time             Sec      60                       Tachycardia detect     On/Off                Threshhold for AS       %                                      Generator battery: 50-75%     PRIOR ANTICONVULSANT HISTORY:   carbamazepine (Tegretol, CBZ):        tried in the past  ethosuximide (Zarontin, ESM):           tried in the past  felbamate (felbatol, FBM):                  tried in the past  gabapentin (Neurontin, GPN):            tried in the past  levetiracetam (Keppra, LEV):             tried in the past  phenytoin (Dilantin, PHT):                  tried in the past  primidone (Mysoline, PRM):               tried in the past  topiramate (Topamax, TPM):             tried in the past  valproic acid (Depakote, VPA):          tried in the past   clorazepate (Tranxene, CLZ):            Stopped previous visits.       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    Environmental and seasonal allergies 09/13/2018    Epilepsy 02/21/2019    S/P placement of VNS (vagus nerve stimulation) device 03/27/2019              Resolved Ambulatory Problems     Diagnosis Date Noted    No Resolved Ambulatory Problems      No Additional Past Medical History      PAST PSYCHIATRIC HISTORY:  no     PAST SURGICAL HISTORY including Epilepsy surgery:             Past Surgical History:   Procedure Laterality Date    epilepsy surgery Left 2008    VAGUS NERVE STIMULATOR INSERTION Left 2/21/2019     Procedure: INSERTION, VAGUS NERVE STIMULATOR;  Surgeon: Teddy Saldana MD;  Location: [facility] OR [address] [place] [address];  Service: Neurosurgery;  Laterality: Left;  toronto II, asa 2, regular bed, supine,      FAMILY HISTORY:             Family History   Problem Relation Age of Onset    Diabetes Father      Cancer Father      Diabetes Sister      Cancer Paternal Aunt      Diabetes Paternal Uncle      Cancer Paternal Grandmother      Diabetes Paternal Grandfather      Cancer Paternal Grandfather         SOCIAL HISTORY:               Social History                   Socioeconomic History    Marital status: Single       Spouse name: Not on file    Number of children: Not on file    Years of education: Not on file    Highest education level: Not on file   Occupational History    Not on file   Tobacco Use    Smoking status: Never Smoker   Substance and Sexual Activity    Alcohol use: No    Drug use: No    Sexual activity: Not on file   Other Topics Concern    Not on file   Social History Narrative    Not on file      Social Determinants of Health            Financial Resource Strain:     Difficulty of Paying Living Expenses:    Food Insecurity:     Worried About Running Out of Food in the Last Year:     Ran Out of Food in the Last  Year:    Transportation Needs:     Lack of Transportation (Medical):     Lack of Transportation (Non-Medical):    Physical Activity:     Days of Exercise per Week:     Minutes of Exercise per Session:    Stress:     Feeling of Stress :    Social Connections:     Frequency of Communication with Friends and Family:     Frequency of Social Gatherings with Friends and Family:     Attends Quaker Services:     Active Member of Clubs or Organizations:     Attends Club or Organization Meetings:     Marital Status:             a) Marital status: Single                                                    b) Living situation: patient lives with parents  c) Employed/Unemployed/Other: Employed full time  - works in a "[employer] [place], [place]     DRIVING HISTORY:  Currently driving: No       LEVEL OF EDUCATION: Masters in Industrial education     SUBSTANCE USE: none     ALLERGIES: Patient has no known allergies.      REVIEW OF SYSTEMS:  Review of Systems     PHYSICAL EXAM:  - General: Awake, cooperative, NAD.  - HEENT: NC/AT  - Neck: deferred due to virtual visit   - Pulmonary: no increased WOB  - Cardiac: well perfused   - Abdomen: deferred due to virtual visit   - Extremities: deferred due to virtual visit   - Skin: no rashes or lesions noted on exposed skin     NEURO EXAM:   - Mental Status: Awake, alert, oriented x 3. Attentive to examiner. Language is hesitant with intact repetition. Fair comprehension. Normal prosody. There were no paraphasic errors. Able to name both high and low frequency objects. Speech was not dysarthric. Able to follow both midline and appendicular commands. There was no evidence of apraxia or neglect.     - Cranial Nerves:   EOMI. No facial droop. Hearing intact to room voice. Trapezii w/ free ROM. Tongue protrudes in midline and to either side with no evidence of atrophy or weakness.     - Motor: Normal bulk. No pronator drift bilaterally. No adventitious movements such as tremor or  asterixis noted.   - Sensory: No subjective deficits.   - Coordination: No dysmetria on FNF  - Gait: remains seated      IMPRESSION:   Focal epilepsy, onset at age 8 years  intractable s/p epi surgery at [facility] in [place] (left temporal resection, 2008) and VNS (2019)  Speech fluency impaired   Episodes of brief dizziness - forced displacement? He gets irritable after the event   Feels irritable when overstimulated by people around him talking      Plan:   - continue lacosamide 200mg BID  - continue perampanel 6mg qhs  - continue clobazam 20mg BID   - continue lamotrigine 600mg BID    - If seizures increase again, consider increasing perampanel   - consider increasing VNS output next visit   - continue sertraline 100mg daily   - follow up in 3 months or sooner with issues  - advised to reach out over the portal with any concerns     Patient and family are comfortable with plan and verbalizes agreement. All questions and concerns are addressed at this time.      Ashely Peoples PA-C   Neurology-Epilepsy  [facility]    Collaborating physician, Dr. Harvey Roberts, was available during today's encounter.     I spent approximately 45 minutes on the day of this encounter preparing to see the patient, obtaining and reviewing history and results, performing a medically appropriate exam, counseling and educating the patient/family/caregiver, documenting clinical information, coordinating care, and ordering medications, tests, procedures, and referrals.    The patient location is: Home  The chief complaint leading to consultation is: Epilepsy  Visit type: Virtual visit with synchronous audio and video  Total time spent with patient: 35 minutes  Each patient to whom he or she provides medical services by telemedicine is:  (1) informed of the relationship between the physician and patient and the respective role of any other health care provider with respect to management of the patient; and (2) notified that  he or she may decline to receive medical services by telemedicine and may withdraw from such care at any time.

## 2023-05-05 DIAGNOSIS — G40.219 LOCALIZATION-RELATED SYMPTOMATIC EPILEPSY AND EPILEPTIC SYNDROMES WITH COMPLEX PARTIAL SEIZURES, INTRACTABLE, WITHOUT STATUS EPILEPTICUS: ICD-10-CM

## 2023-05-05 RX ORDER — CLOBAZAM 20 MG/1
20 TABLET ORAL 2 TIMES DAILY
Qty: 180 TABLET | Refills: 0 | Status: SHIPPED | OUTPATIENT
Start: 2023-05-05 | End: 2023-10-12

## 2023-05-23 ENCOUNTER — TELEPHONE (OUTPATIENT)
Dept: NEUROLOGY | Facility: CLINIC | Age: 48
End: 2023-05-23
Payer: COMMERCIAL

## 2023-05-25 ENCOUNTER — TELEPHONE (OUTPATIENT)
Dept: NEUROLOGY | Facility: CLINIC | Age: 48
End: 2023-05-25
Payer: COMMERCIAL

## 2023-05-25 NOTE — TELEPHONE ENCOUNTER
----- Message from Ashely Peoples PA-C sent at 5/25/2023 10:58 AM CDT -----  Regarding: FW: appt on 05/30/23  Contact: @[phone] or [phone]    ----- Message -----  From: Tree Taylor  Sent: 5/25/2023   9:04 AM CDT  To: [facility] Staff  Subject: appt on 05/30/23                                 Pt requesting a call back from Julianna regarding a miss call to schedule an appt on 05/30/23.  Pt would like a call back but is ok with the 30th need to confirm the time.

## 2023-05-30 ENCOUNTER — OFFICE VISIT (OUTPATIENT)
Dept: NEUROLOGY | Facility: CLINIC | Age: 48
End: 2023-05-30
Payer: COMMERCIAL

## 2023-05-30 VITALS
HEIGHT: 71 IN | HEART RATE: 93 BPM | SYSTOLIC BLOOD PRESSURE: 122 MMHG | WEIGHT: 201.63 LBS | DIASTOLIC BLOOD PRESSURE: 77 MMHG | BODY MASS INDEX: 28.23 KG/M2

## 2023-05-30 DIAGNOSIS — R41.3 MEMORY CHANGES: ICD-10-CM

## 2023-05-30 DIAGNOSIS — Z96.89 S/P PLACEMENT OF VNS (VAGUS NERVE STIMULATION) DEVICE: ICD-10-CM

## 2023-05-30 DIAGNOSIS — Z60.4 ISOLATION, SOCIAL: ICD-10-CM

## 2023-05-30 DIAGNOSIS — G40.219 LOCALIZATION-RELATED SYMPTOMATIC EPILEPSY AND EPILEPTIC SYNDROMES WITH COMPLEX PARTIAL SEIZURES, INTRACTABLE, WITHOUT STATUS EPILEPTICUS: Primary | ICD-10-CM

## 2023-05-30 DIAGNOSIS — F06.30 MOOD DISORDER DUE TO MEDICAL CONDITION: ICD-10-CM

## 2023-05-30 PROCEDURE — 95976 ALYS SMPL CN NPGT PRGRMG: CPT | Mod: S$GLB,,,

## 2023-05-30 PROCEDURE — 3008F PR BODY MASS INDEX (BMI) DOCUMENTED: ICD-10-PCS | Mod: CPTII,S$GLB,,

## 2023-05-30 PROCEDURE — 3074F SYST BP LT 130 MM HG: CPT | Mod: CPTII,S$GLB,,

## 2023-05-30 PROCEDURE — 1159F PR MEDICATION LIST DOCUMENTED IN MEDICAL RECORD: ICD-10-PCS | Mod: CPTII,S$GLB,,

## 2023-05-30 PROCEDURE — 99215 OFFICE O/P EST HI 40 MIN: CPT | Mod: S$GLB,,,

## 2023-05-30 PROCEDURE — 3008F BODY MASS INDEX DOCD: CPT | Mod: CPTII,S$GLB,,

## 2023-05-30 PROCEDURE — 3078F PR MOST RECENT DIASTOLIC BLOOD PRESSURE < 80 MM HG: ICD-10-PCS | Mod: CPTII,S$GLB,,

## 2023-05-30 PROCEDURE — 99215 PR OFFICE/OUTPT VISIT, EST, LEVL V, 40-54 MIN: ICD-10-PCS | Mod: S$GLB,,,

## 2023-05-30 PROCEDURE — 3074F PR MOST RECENT SYSTOLIC BLOOD PRESSURE < 130 MM HG: ICD-10-PCS | Mod: CPTII,S$GLB,,

## 2023-05-30 PROCEDURE — 1159F MED LIST DOCD IN RCRD: CPT | Mod: CPTII,S$GLB,,

## 2023-05-30 PROCEDURE — 99999 PR PBB SHADOW E&M-EST. PATIENT-LVL IV: CPT | Mod: PBBFAC,,,

## 2023-05-30 PROCEDURE — 99999 PR PBB SHADOW E&M-EST. PATIENT-LVL IV: ICD-10-PCS | Mod: PBBFAC,,,

## 2023-05-30 PROCEDURE — 3078F DIAST BP <80 MM HG: CPT | Mod: CPTII,S$GLB,,

## 2023-05-30 PROCEDURE — 95976 PR ELEC ANALYSIS, IMPLT NEURO PULSE GEN, W/PRGRM, SMPL CRAN NERVE: ICD-10-PCS | Mod: S$GLB,,,

## 2023-05-30 RX ORDER — SERTRALINE HYDROCHLORIDE 100 MG/1
200 TABLET, FILM COATED ORAL DAILY
Qty: 180 TABLET | Refills: 3 | Status: SHIPPED | OUTPATIENT
Start: 2023-05-30 | End: 2024-03-20 | Stop reason: SDUPTHER

## 2023-05-30 SDOH — SOCIAL DETERMINANTS OF HEALTH (SDOH): SOCIAL EXCLUSION AND REJECTION: Z60.4

## 2023-05-30 NOTE — PATIENT INSTRUCTIONS
You came [facility] because of your seizure disorder. Your seizures are controlled on perampanel 6 mg nightly, lacosamide 200mg twice daily, lamotrigine 600mg twice daily, and clobazam 20mg twice daily. Please continue the same medications at the same dose.     We increased your VNS today. You may experience increased cough for the next 2 days.     Do not miss any doses of medication. If a dose of medication is missed, take it as soon as it is remembered even if that means doubling up on the dose. Get regular sleep. Go to sleep at the same time and wake up at the same time every day. People with epilepsy require more sleep than people without epilepsy.  Sleeping 10-12 hours a day can be normal for a person with epilepsy.  Every seizure makes it harder to prevent the next seizure. Epilepsy is associated with SUDEP, or sudden unexpected death in epilepsy.  The risk is significantly higher if convulsive seizures are not well controlled. For more information, check out these websites: https://www.epilepsy.com/, https://www.epilepsyall[place].org/, www.[place]-epilepsy.org, www.womenandepilepsy.org.  If you are interested in meeting other individuals in our epilepsy community, please reach out to the [facility] [place] ([phone], [phone], [email or web address]).  They organize many informative and fun activities in the region.  They can provide you invaluable information on how to get access to resources available for patients living with epilepsy as well as a rich community of like-minded individuals who are all learning to cope with the same issues.  It is very important to remember, you are not alone.     I placed a referral to psychiatry. Please call [facility] main line ([phone]) and ask them to transfer you to the psychiatry clinic. Let them know your doctor placed a referral and you are trying to schedule an appointment. Let me know if you have any issues.     Please increase  the sertraline to 200mg once daily.     If you would like to repeat neuropsych testing to reevaluate your memory. Please let me know.     Per [place] law, no episodes of loss of consciousness for 6 months before driving.  Avoid dangerous situations.  For example, no baths/pools alone, no heights, no power tools.  Wear a bike helmet.  If breakthrough seizures occur that are different in character, frequency, or duration from normal episodes, please patient portal me or call the office and we will decide the next steps. If multiple seizures occur in a row without return back to baseline, 911 needs to be called.     Return to clinic in 3 months or sooner with issues.  Please patient portal with any questions or concerns.    Ashely Peoples PA-C   Neurology-Epilepsy  [facility]    Forms/Letters/Disability/DMV Paperwork: We understand the importance of filling out forms and providing letters in a timely manner.  However, many of these forms have very tricky language and once an official form is submitted as part of the medical record, it can not be modified or erased.  Please work with us in order to get these forms filled out in the most complete, accurate, and efficient way. 1.  Once you are aware that a form will need to be completed, please make an appointment.  A virtual appointment is perfectly fine. 2.  Please fill out the form as much as you can.  There are many questions that we do not have an answer for.  Please bring a blank copy of the form and your partially filled out form to the clinic visit or send them to us over the portal.  We will complete the form together with you during the clinic visit, sign it, and either return it to you or send it to the correct destination.  Every form will require an appointment however we can fill out multiple forms at once if needed.  Please do not hesitate to reach out with any questions or concerns about this policy.  We are trying to make sure  that we have a system in place to meet this need which works for everyone involved.  Thank you for your understanding.      Below are some tips from our neurocognitive colleagues:     PRACTICE GOOD COGNITIVE HYGIENE  Engage in regular exercise, which increases alertness and arousal and can improve attention and focus.  Consider lower impact exercises, such as yoga or light walking. Try to exercise for at least 150 minutes per week  Get a good nights sleep, as this can enhance alertness and cognition.  Eat healthy foods and balanced meals. It is notable that research indicates certain nutrients may aid in brain function, such as B vitamins (especially B6, B12, and folic acid), antioxidants (such as vitamins C and E, and beta carotene), and Omega-3 fatty acids. Here are some common tips for diet (Adopted from Cameron et al, [facility], 2018):  Eat primarily plant-based foods, such as fruits and vegetables, whole grains, legumes   (beans) and nuts.  Limit refined carbohydrates (white pasta, bread, rice).  Replace butter with healthy fats such as olive oil.  Use herbs and spices instead of salt to flavor foods.  Limit red meat and processed meats to no more than a few times a month.  Avoid sugary sodas, bakery goods, and sweets.  Eat fish and poultry at least twice a week.  Keep your brain active. Find activities to stay mentally active, such as reading, games (cards, checkers), puzzles (crosswords, Sudoku, jig saw), crafts (models, woodworking), gardening, or participating in activities in the community.  Stay socially engaged. Continue staying active with your family and friends.     COGNITIVE TIPS AND STRATEGIES  The following tips and strategies are provided to help assist in daily activities:      Attention: Remember that inattention and lack of focus are major culprits to forgetting information so be sure and practice paying attention for adequate learning of information. If you rely on passive attention to remembering  something (e.g., yeah, uh-huh approach), youll find you cannot recall it later. I recommend the following to improve attention, which may aid in later recall:  1. Reduce distractions in the area as much as possible  2. Look at the person as they are speaking to you.   3. Paraphrase as they are speaking  4. Write down important pieces of information   5. Ask them to repeat if you zone out.  6. Have them simplify and reduce information that you need to attend to during conversation.  7. Have visual cues to remind you if you need to do something later.     Processing Speed:  1. Using multiple modalities (e.g., listening, writing notes, asking questions, recording) to learn new information is likely to allow additional time for processing, thus improving memory for the material.   2. Allowing sufficient time to complete tasks will reduce frustration and help to ensure completion.     Executive Functionin. Dont attempt to multi-task.  Separate tasks so that each can be completed one at a time  2. Consider using a calendar/day planner, as that may be effective to help you plan and stay on track.  Color-coding specific tasks by importance may add additional benefit to your planner  3. Break down large projects into smaller tasks and write down the steps to completing the task.  Taking notes while reading can help with recall.     Storing Information: Use the below strategies to help you further enhance how information is stored  1. Rehearse - Immediately after seeing/hearing something, try to recall it.  Wait a few minutes, then check again.  Gradually lengthen the intervals between rehearsals.  2. Repetition of learned material is critical to ensure storage of information to be learned. Self-test at home to ensure learning.  3. Write down important information to improve your attention and focus and to have something to look back on when you need to recall it.  4. Make sure the person doesnt rattle off, but  presents in a clear, logical, and unhurried manner.      Recalling Information:  1. Jog your memory - Lose something?  Think back to when you last had it.  What did you do next?  And after that?  Mentally walk yourself through each activity that followed.  Prodding your memory this way may enable you to recall the location of the missing item.  2. Use a cue - Symbolic reminders (the proverbial string around the finger) are helpful.  So too are memos, timers, calendar notes, etc.--keep them in visible, appropriate place  3. Get organized - Have fixed locations for all important papers, key phone numbers, medications, keys, wallet, glasses, tools, etc.  4. Develop routines - Routines can anchor memories so they do not drift away.

## 2023-05-30 NOTE — PROGRESS NOTES
CC: Epilepsy    Interval Events/ROS 5/30/2023:    Accompanied by father and salvador Rojasn (via phone) who also contribute to history.     Current ASM/SE: lacosamide 200 mg BID, clobazam 20 mg BID, perampanel 6 mg daily, lamotrigine 600 mg BID, and VNS; no SE reported  Breakthrough seizures/ events: about 6 seizures in the last 3 months; last event was 5/28; a couple of these were triggered by missed meds, but the others have no identifiable trigger  Driving: no  Sleep: 6 hours/ night  Mood: not great, still experiencing feelings of depression, anxiety, and loneliness with sertraline 100 mg once daily; not established with psychiatrist or therapist currently      The patient notes that his memory functions have decreased since last visit. Otherwise, no fever, no cold symptoms, no headache, no changes in vision, no new weakness, no chest pain, no shortness of breath, no nausea, no vomiting, no diarrhea, no constipation, no tingling/ numbness, no problems walking.    Recent Labs   Lab 07/31/20  1006 05/14/21  1155 04/07/22  1445 01/18/23  1207   Lamotrigine Lvl 14.5 14.1 15.9 H 15.7 H   Lacosamide 10.5 H 11.1 H 7.7 8.5   Clobazam 805.0 H 859.0 H 438.0 H 537.0 H   Desmethylclobazam 6160.0 H 5810.0 H 3050.0 H 4710.0 H   Perampanel (Fycompa) Quant  --  140  --  153 L         Interval Events/ROS 2/28/2023:    Current ASM/SEs: lacosamide 200mg BID, clobazam 20mg BID, perampanel 6mg daily, lamotrigine 600mg BID, and a VNS  Breakthrough seizures/events: no seizures since last visit   Driving: no  Sleep: good   Mood: started sertraline 100mg four weeks ago     Otherwise, no fever, no cold symptoms, no headache, no changes in vision, no new weakness, no chest pain, no shortness of breath, no nausea, no vomiting, no diarrhea, no constipation, no tingling/numbness, no problems walking.     Interval Events/ROS 1/18/2023:    Accompanied by parents who also contribute to the history.     Current ASM/SEs: lacosamide 200mg BID,  clobazam 20mg BID, perampanel 6mg daily, lamotrigine 600mg BID, and a VNS; SE anger/irritability  Breakthrough seizures/events: 7 seizures in the past 4 months, no identifiable trigger  Driving: no  Sleep: not great  Mood: anxious, angry, lonely; notes mild improvement w/ sertraline 50mg daily    Chronic memory issues. Otherwise, no fever, no cold symptoms, no headache, no changes in vision, no new weakness, no chest pain, no shortness of breath, no nausea, no vomiting, no diarrhea, no constipation, no tingling/numbness, no problems walking.    Interval Events/ROS 9/23/2022:    Current ASM/SEs: lacosamide 200mg BID, clobazam 20mg BID, perampanel 6mg daily, lamotrigine 600mg BID, and a VNS; SE anger   Breakthrough seizures/events: current frequency is around one per month; last event was 9/17/2022   Driving: no  Sleep: not well; up late w/ ruminating thoughts/mind racing  Mood: anxious, angry, lonely    Otherwise, no fever, no cold symptoms, no headache, no changes in vision, no new weakness, no chest pain, no shortness of breath, no nausea, no vomiting, no diarrhea, no constipation, no tingling/numbness, no problems walking.     Interval Events/ROS 4/7/2022:  - Presents with parents who also contribute to the history  - Patient is a 47 y/o man with a hx of focal epilepsy s/p left temporal resection (2008) and a VNS (2019)  - Current seizure frequency is around one focal event with preserved awareness about every 10-14 days though reports he did have a seizure w/ impaired awareness last week that occurred at work, unsure how long it lasted though believes it lasted longer than his other events   - No missed doses of medication, no other identifiable trigger of this larger event although he mentions he did have a sinus infection at the time   - He is on a current medication regimen of lacosamide 200mg BID, clobazam 20mg BID, perampanel 6mg daily, and lamotrigine 600mg BID with no reported side effects  - Tolerating  current VNS settings well  - Has questions and would like further clarification regarding neurocognitive testing results   - Otherwise, no fever, no cold symptoms, no headache, no changes in vision, no new weakness, no chest pain, no shortness of breath, no nausea, no vomiting, no diarrhea, no constipation, no tingling/numbness, no problems walking    Prior note:   Presents with father   11/18/21 (not clear), 11/27/21   Dec 3 and 9 - event of 10-15 sec dizziness   Episodes of easily irritability at work   Pt is a poor historian     Prior note:   Sz log:   Oct 29 - GTC   Oct 30 - GTC (despite taking ativan 2 hrs before) trigger - anxiety, nervous   Today had a bout of dizziness and imbalance - this is a rare occurrence about 1-2 hrs after taking meds      Pt of Dr. Chan  Dizziness much improved    Last sz - 9/22/21 -   Semiology re-surgery - several thoughts race through mind, limb jerking    Semiology after surgery - distracted by thoughts, less jerking      Seizure log since last visit: none  - last sz: 1/19/2021  - possible sz 4/28 ?unsure   - mostly dizziness is the issue     Current AEDs:   -  mg bid  (200mg 1 1/2 tabs bid)   - PER 6 mg q day (2mg 3 tabs q AM)   - CLOB 40 mg bid (20mg 2 tabs bid) - suggested increase at last visit 50mg bid - but patient decreased to 30mg bid due to dizziness  PCP decreased 20mg-30mg - per pt and his father, the change has decreased the dizziness over the past week  - LTG 600mg bid (200mg 3 tabs bid)  - VNS      Results for GEORGETTE MONCADA JR. (MRN [id number]) as of 5/31/2021 08:29    Ref. Range 4/24/2019 14:03 5/6/2020 11:44 7/31/2020 10:06 5/14/2021 11:55   Clobazam Latest Ref Range: 30 - 300 ng/mL 916.0 (H) 849.0 (H) 805.0 (H) 859.0 (H)   Lacosamide Latest Ref Range: 1.0 - 10.0 mcg/mL 7.5 10.2 (H) 10.5 (H) 11.1 (H)   Lamotrigine Lvl Latest Ref Range: 2.0 - 15.0 ug/mL 13.2 16.1 (H) 14.5 14.1   Perampanel (Fycompa) Quant Latest Units: ng/mL       140    Desmethylclobazam Latest Ref Range: 300 - 3000 ng/mL 6180.0 (H) 7820.0 (H) 6160.0 (H) 5810.0 (H)         Notes from last clinic visit, 3/11/2021:  Seizure log since last visit:  1/19  12/2  10/12      Results for GEORGETTE MONCADA JRShahnaz (MRN [id number]) as of 3/11/2021 11:29    Ref. Range 7/13/2017 12:55 4/24/2019 14:03 5/6/2020 11:44 7/31/2020 10:06   Lacosamide Latest Ref Range: 1.0 - 10.0 mcg/mL 6.8 7.5 10.2 (H) 10.5 (H)   Lamotrigine Lvl Latest Ref Range: 2.0 - 15.0 ug/mL 12.6 13.2 16.1 (H) 14.5   Perampanel (Fycompa) Quant Latest Units: ng/mL 160         Clobazam Latest Ref Range: 30 - 300 ng/mL 894 (H) 916.0 (H) 849.0 (H) 805.0 (H)   Desmethylclobazam Latest Ref Range: 300 - 3000 ng/mL 5904 (H) 6180.0 (H) 7820.0 (H) 6160.0 (H)      Current AEDs:   - LCS 300mg bid  (200mg 1 1/2 tabs bid)  - PER 6 mg q day (2mg 3 tabs q AM)   - CLOB 40 mg bid (20mg 2 tabs bid)  - LTG 600mg bid (200mg 3 tabs bid)  - VNS   Model # AspireSR M106  Implant Date: 2/21/2019  Is Device palpable in chest wall                     Yes  Side of implant                                                L                                                       Initial               Reprogram   Output current             mA      0.675               0.675  Signal Freq                 Hz        30                    30  Pulse width                 uSec    750                  1000    Signal on time             Sec      30                    30  Signal off time             Min      10.0                 10.0    Autostimulation (AS)  Output Current            mA       0.75                 0.75  Pulse width                 uSec    750                  1000  Signal on time             Sec      60    Magnet settings  Output current             mA       0.75                 0.75  Pulse width                 uSec    750                  1000  Signal on time             Sec      60                    60     Tachycardia detect                 On/Off               On/off  Threshhold for AS       %                                         Seizure hx:   - onset at age 8 years     - RUE and head jerking movements -> GTC sz   - no hx of tongue/cheek bite or bowel incontinence; infrequently bladder incontinence  - epilepsy surgery done at [facility], [place] in 2008 - possibly less frequent per father; patient feels that he is more aware of it coming on and is related to triggers  - VNS placed in 2019 - did not make any difference per father     Notes from last clinic visit with , 8/12/2020:  Patient may have had a seizure on Jun 30.  He had a typical warning, swiped the VNS and did not lose awareness.  That is the only symptoms he experienced.  He is not experiencing any AEs from the meds. He does not have any other medical issues.    2020/05/14  He has noticed intermittent dizziness particularily with change in posture.  Dad fell his comprehension is not as good.  He reports two seizures - 5 days ago after missed AM dose.  The second occurred at work when he went to see his supervisor. 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  Starting 2-3 weeks after last visit he began to have the intermittyent dizziness.  Lamictal level is high enough that there may be a pharmacodynamic interaction with lacosamide.  2019/12/12  Since the last visit he had only two seizures and he retained awareness with both.  He has been taking ativan when he goes to football games.  Lamictal levels have been stable and the clearance calculated from the dose/level indicates he is a rapid metabolizer.    2019/08/29  Patient reports three sz since last visit.  All have been partial with no convulsions.  He is tolerating the VNS well.  He has not been using the magnet to block seizures..  He is tolerating the medication well.  In the past he would usually have a seizure when he went to a game.  I have given him 1 mg ativan to take 1 hrs before a game and  This has worked well  in preventing seizures.    2019/04/24  Doing well since last visit, no reported seizures. Tolerated initial VNS settings well. Returns today for continued VNS adjustment. Medication regimen unchanged.   2019/03/27  VNS implanted 2/21/19, tolerated procedure well. He was seen by NSGY in a virtual visit 3/7/19 for his 2 week post-op appointment, incision clean/dry/intact and well approximated at that time, and on exam today. Patient/family report no recent seizures. Continues to take Lacosamide, Clobazam, Lamotrigine, and Perampanel as prescribed. Presents today for first VNS adjustment.   2018/09/13  Patient reports 3 mild CPS since the last visit.  Last visit I gave him a schedule to increase lacosamide in two steps to 200 mg 1½ BID.  When he reached this dose he became dizzy and reduced the morning dose to 1 tab with resolution of the dizzinesss.  Last visit I discussed VNS treatment and today the patient wants to proceed with implanting the device.  He has no other medical problems except allergies.  He does take loratadine which works on the H1 reception similar to Benadryl but has not been been implicated in lowering seizure threshhold.    2018/01/26  The patient reports 3 sz since the last visit.  The most recent may have resulted from missed dose.  No triggers evident for the other 2 sz.  The patient reports he has an aura but can not describe what he perceives.    2017/07/13  In the past 6 months the patient has experienced five seizures (about 1 per month).   The patient was having difficulty expressing himself - speech is halting and disconnected.  He has been taking ativan prior to social event or watching sports to prevent seizure.  He has been taking ativan about 2 times a month.    2016/04/19 and 2017/01/23              Since the last visit the patient has experienced one seizures (12 Jan 2017) .  Meds were increased last time and he is tolerating the new dosages well.  There was no obvious provocative  reason.  He has a good strategy for compliance.  He had no concurrent medical problem and had not taken benadryl or other proconvulsive medication.    pt has had the following episodes:  2015/4/8 - Pt had just finished a walk.  Eye blinking and stereotypic hand movements.  No generalized convulsions were noted. Didn't lose consciousness.  Lasted 30s to 1 minute.  2015/6/6 - Pt was in his room.  Came to his dad to give him his phone to record what was happening.  Said he has the feeling that he's about to have a seizure.  Says there is no aura per se.  Says a thought that he's about to have a seizure pops into his head, which usually precedes his events.  Said his speech got markedly worse during the event.  Eye blinking lasted 30s to 1 minute and resolved spontaneously.  Also had stereotypic hand movements and eye blinking.  Maintained consciousness throughout episode.  Pt had no memory of the seizure, which was relayed by his dad.     2015/6/8 - Pt was running around a track.  Said he overexerted himself over the last three laps.  Said he had another event immediately after exercise.  Again, this episode was brief (30s-1min) and was characterized by walking in circles, eye blinking, and stereotypic hand motions of rubbing his fingers. Had no intraictal recall.       Previous History (2015/4/1)              The patient was referred for consultation by Dr. Guerrero due to want to transfer care here due to long distance, patient is living [facility].  The patient was accompanied by his mother and father who provided some of the history.       Seizure surgery 2008 for epilepsy. Since then, less frequent and not as strong, still have seizure twice a month with current medication dosage. He is compliant with medication, tolerates medication well, no side effect noticed. On current regime for about 5 years, change to onfi 2-3 years ago in place of keppra.         Seizure Seminology  Seizure Type 1  Classification:   Aura -  feel it is coming, his mind told him that seizure is coming  Ictus  - Nonconv -  - Conv - whole body jerking more on the right side, after a couple of jerk, he is out, first started has tongue biting and incontinence  - Duration - 2 minutes  Post-ictal  - Symptoms: can not talk, sleepiness no headache  - Duration: 5-10 minutes  Age of onset : 6 year old  Current Seizure Frequency -  Initially once week,  Last Seizure: 10 years ago     Seizure Type 2: after seizure  Classification:   Aura - feel it is coming, but not as strong as before  Ictus  - Nonconv -  - Conv - blinking and body jerk more on the arm.   - Duration - 30 sec  Post-ictal  - Symptoms: no sleepiness  - Duration: back to baseline after ictus right away  Age of onset : 29  Current Seizure Frequency -  Last Seizure: a couple weeks ago  Triggers: stress, missed meds, computer use, overexertion     Any other relevant information:  - none     PREVIOUS EVALUATIONS:    Previous EEGs:   - per 's notes, C-EEG in 2014: 'not abnormal'     Previous MRIs:  - per 's notes, MRI in 2015: 'no new changes'     Additional tests:  1)CT Scan:   2) EEG\Video Monitoring: no  3) PET Scan: no  4) Neuropsychological evaluation: no  5) DEXA Scan: no  6) Others: no     RISK FACTORS FOR SEIZURES:    1. Head Trauma:  No    2. CNS Infections:  No  3. CNS Tumors: No     4. CNS Vascular Disease: No     5. Febrile Seizures: No    6. Developmental Delay: No       7. Family History of Seizures: No    8. Birth history: FTNVD     Pregnancy/Labor/Delivery: n/a     CURRENT MEDICATIONS:   Current Medications               Current Outpatient Medications   Medication Sig Dispense Refill    cloBAZam (ONFI) 20 mg Tab TAKE TWO TABLETS BY MOUTH TWICE DAILY. GENERIC EQUIVALENT TO ONFI. 360 tablet 2    FLUARIX QUAD 7348-0016, PF, 60 mcg (15 mcg x 4)/0.5 mL Syrg vaccine     0    lacosamide (VIMPAT) 200 mg Tab tablet Take 1.5 tablets (300 mg total) by mouth every 12 (twelve) hours.  270 tablet 2    lamoTRIgine (LAMICTAL) 200 MG tablet Take 3 tablets (600 mg total) by mouth 2 (two) times daily. 540 tablet 2    LORazepam (ATIVAN) 1 MG tablet Take 1 tablet (1 mg total) by mouth as needed (seizure). 5 tablet 0    multivitamin (THERAGRAN) per tablet Take 1 tablet by mouth once daily.        perampaneL (FYCOMPA) 2 mg tablet Take 3 tablets (6 mg total) by mouth once daily. 270 tablet 2      No current facility-administered medications for this visit.         Folic acid: no     CURRENT ANTI EPILEPTIC MEDICATIONS:  See hpi     VAGAL NERVE STIMULATOR:     5/30/2023:  Model #     AspireSR M106 s/n [id number]  Implant Date    2/21/2019  Is Device palpable in chest wall  Yes   Side of implant    L           Initial  Reprogram   Output current  mA 0.875  1.0  Signal Freq          Hz 30  30  Pulse width         uSec 500  500  Signal on time      Sec 30  30  Signal off time      Min 5.0  5.0    Magnet settings  Output current          mA 1.125  1.25  Pulse width         uSec 500  500  Signal on time         Sec 60  60    Autostimulation (AS)  Output Current          mA 1.0  1.125  Pulse width         uSec 500  500  Signal on time         Sec 30  Tachycardia detect  On  On  Threshhold for AS % 20  20    Battery: 50-75%      VNS interrogated 1/18/2023. Battery life 50-75%. No changes made. Settings as listed below.    9/23/2022  Model #     AspireSR M106 S/N [id number]  Implant Date    02/21/2019  Is Device palpable in chest wall  Yes   Side of implant    L           Initial  Reprogram   Output current  mA 0.75  0.875  Signal Freq          Hz 30  30  Pulse width         uSec 500  500  Signal on time      Sec 60  30  Signal off time      Min 5.0  5.0    Magnet settings  Output current          mA 1.0  1.125  Pulse width         uSec 500  500  Signal on time         Sec 60  60    Autostimulation (AS)  Output Current          mA 0.875  1.0  Pulse width         uSec 500  500  Signal on time         Sec 30  30  Tachycardia  detect  On  On      VNS was interrogated today (4/7/2022) and the following changes were made:     Model # AspireSR M106  Implant Date: 2/21/2019  Is Device palpable in chest wall                     Yes  Side of implant                                                L                                                             Prior settings:  Output current             mA       0.675               Signal Freq                 Hz        30                      Pulse width                 uSec    500               Signal on time             Sec      30                     Signal off time             Min      5.0                   Duty cycle 10%    Autostimulation (AS)  Output Current            mA       0.75                  Pulse width                 uSec    500                   Signal on time             Sec      30    Magnet settings  Output current             mA       0.75                  Pulse width                 uSec    500                    Signal on time             Sec      60                       Tachycardia detect     On/Off                Threshhold for AS       %                                      Lead impedence 3289 Ohms  Generator battery: 50-75 %     New settings:                                                         Output current             mA       0.75               Signal Freq                 Hz        30                      Pulse width                 uSec    500               Signal on time             Sec      60                     Signal off time             Min      5.0                   Duty cycle 18%    Autostimulation (AS)  Output Current            mA       0.875                  Pulse width                 uSec    500                   Signal on time             Sec      30    Magnet settings  Output current             mA       1.0                  Pulse width                 uSec    500                    Signal on time             Sec      60                        Tachycardia detect     On/Off                Threshhold for AS       %                                      Generator battery: 50-75%     PRIOR ANTICONVULSANT HISTORY:   carbamazepine (Tegretol, CBZ):        tried in the past  ethosuximide (Zarontin, ESM):           tried in the past  felbamate (felbatol, FBM):                  tried in the past  gabapentin (Neurontin, GPN):            tried in the past  levetiracetam (Keppra, LEV):             tried in the past  phenytoin (Dilantin, PHT):                  tried in the past  primidone (Mysoline, PRM):               tried in the past  topiramate (Topamax, TPM):             tried in the past  valproic acid (Depakote, VPA):          tried in the past   clorazepate (Tranxene, CLZ):            Stopped previous visits.       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    Environmental and seasonal allergies 09/13/2018    Epilepsy 02/21/2019    S/P placement of VNS (vagus nerve stimulation) device 03/27/2019              Resolved Ambulatory Problems     Diagnosis Date Noted    No Resolved Ambulatory Problems      No Additional Past Medical History      PAST PSYCHIATRIC HISTORY:  no     PAST SURGICAL HISTORY including Epilepsy surgery:             Past Surgical History:   Procedure Laterality Date    epilepsy surgery Left 2008    VAGUS NERVE STIMULATOR INSERTION Left 2/21/2019     Procedure: INSERTION, VAGUS NERVE STIMULATOR;  Surgeon: Teddy Saldana MD;  Location: [facility] OR [address] [place] [address];  Service: Neurosurgery;  Laterality: Left;  toronto II, asa 2, regular bed, supine,      FAMILY HISTORY:             Family History   Problem Relation Age of Onset    Diabetes Father      Cancer Father      Diabetes Sister      Cancer Paternal Aunt       Diabetes Paternal Uncle      Cancer Paternal Grandmother      Diabetes Paternal Grandfather      Cancer Paternal Grandfather         SOCIAL HISTORY:               Social History                   Socioeconomic History    Marital status: Single       Spouse name: Not on file    Number of children: Not on file    Years of education: Not on file    Highest education level: Not on file   Occupational History    Not on file   Tobacco Use    Smoking status: Never Smoker   Substance and Sexual Activity    Alcohol use: No    Drug use: No    Sexual activity: Not on file   Other Topics Concern    Not on file   Social History Narrative    Not on file      Social Determinants of Health            Financial Resource Strain:     Difficulty of Paying Living Expenses:    Food Insecurity:     Worried About Running Out of Food in the Last Year:     Ran Out of Food in the Last Year:    Transportation Needs:     Lack of Transportation (Medical):     Lack of Transportation (Non-Medical):    Physical Activity:     Days of Exercise per Week:     Minutes of Exercise per Session:    Stress:     Feeling of Stress :    Social Connections:     Frequency of Communication with Friends and Family:     Frequency of Social Gatherings with Friends and Family:     Attends Orthodox Services:     Active Member of Clubs or Organizations:     Attends Club or Organization Meetings:     Marital Status:             a) Marital status: Single                                                    b) Living situation: patient lives with parents  c) Employed/Unemployed/Other: Employed full time  - works in a [employer], [employer] [place], [place]     DRIVING HISTORY:  Currently driving: No       LEVEL OF EDUCATION: Masters in Industrial education     SUBSTANCE USE: none     ALLERGIES: Patient has no known allergies.      REVIEW OF SYSTEMS:  Review of Systems     EXAM:   - Vitals: BP: 122/77     Pulse: 93     SpO2: 100%     Temp: 97.7 F      Wt: 91.4 kg (201 lbs. 9.8  "oz.)     Ht: 5'11"    BMI: 28.12  - General: Awake, cooperative, NAD.  - HEENT: NC/AT  - Neck: Supple  - Pulmonary: no increased WOB  - Cardiac: well perfused   - Abdomen: soft, nontender, nondistended  - Extremities: no edema  - Skin: no rashes or lesions noted     NEURO EXAM:   - Mental Status: Awake, alert, oriented x 3. Attentive to examiner. Language is occasionally hesitant with intact repetition and comprehension. Normal prosody. There were no paraphasic errors. Able to name both high and low frequency objects. Speech was not dysarthric. Able to follow both midline and appendicular commands. There was no evidence of apraxia or neglect.     - Cranial Nerves:  VFF. EOMI. No facial droop. Hearing intact to room voice. 5/5 strength in trapezii and SCM bilaterally. Tongue protrudes in midline and to either side with no evidence of atrophy or weakness.     - Motor: Normal bulk and tone throughout. No pronator drift bilaterally. No adventitious movements such as tremor or asterixis noted.     Delt Bic Tri WrE WrF  FFl FE IO IP Quad Ham TA Gastroc   R   5     5    5    5    5        5   5    5   5    5        5     5      5            L   5      5    5   5    5        5    5   5    5    5       5     5      5               - Sensory: No deficits to light touch.   - Coordination: No dysmetria on FNF  - Gait: Good initiation. Narrow-based, normal stride and arm swing. Romberg negative    IMPRESSION:   Focal epilepsy, onset at age 8 years  intractable s/p epi surgery at [facility] in [place] (left temporal resection, 2008) and VNS (2019)  Speech fluency impaired   Feels irritable when overstimulated by people around him talking      Plan:   - continue lacosamide 200mg BID  - continue perampanel 6mg qhs  - continue clobazam 20mg BID   - continue lamotrigine 600mg BID    - consider increasing clobazam at next visit  - increased VNS output today; changes made are summarized above   - consider increasing VNS output again at " next visit   - increase sertraline from 100mg daily to 200 mg daily   - referral to a psychiatrist for further evaluation/medication management of chronic mood disorder    - offered neuropsychology referral for repeat testing to assess interval change given patient's memory concerns, which he declines at this time   - follow up in 3 months or sooner with any issues  - advised to reach out over the portal with any concerns     Patient and family are comfortable with plan and verbalizes agreement. All questions and concerns are addressed at this time.     RED Garcia PA-C   Neurology-Epilepsy  [facility]    Collaborating physician, Dr. Harvey Roberts, was available during today's encounter.     I spent approximately 60 minutes on the day of this encounter preparing to see the patient, obtaining and reviewing history and results, performing a medically appropriate exam, counseling and educating the patient/family/caregiver, documenting clinical information, coordinating care, and ordering medications, tests, procedures, and referrals.

## 2023-08-02 ENCOUNTER — TELEPHONE (OUTPATIENT)
Dept: NEUROLOGY | Facility: CLINIC | Age: 48
End: 2023-08-02
Payer: COMMERCIAL

## 2023-08-02 NOTE — TELEPHONE ENCOUNTER
"Received forms from [employer] for completion and signature of ordering provider, CANDICE Peoples PA-C. These have been completed and faxed with last clinic notes, and Rx from 2016 showing this dose has been prescribed since then. Faxed to [phone]    Your fax has been successfully sent to [phone] at [phone].  ------------------------------------------------------------  From: [id number]  ------------------------------------------------------------  8/2/2023 11:06:36 AM Transmission Record   Sent to [phone] with remote ID "[id number]"   Result: (0/339;0/0) Success   Page record: 1 - 26   Elapsed time: 08:38 on channel 25      "

## 2023-08-08 DIAGNOSIS — G40.219 LOCALIZATION-RELATED SYMPTOMATIC EPILEPSY AND EPILEPTIC SYNDROMES WITH COMPLEX PARTIAL SEIZURES, INTRACTABLE, WITHOUT STATUS EPILEPTICUS: ICD-10-CM

## 2023-09-06 ENCOUNTER — OFFICE VISIT (OUTPATIENT)
Dept: NEUROLOGY | Facility: CLINIC | Age: 48
End: 2023-09-06
Payer: COMMERCIAL

## 2023-09-06 ENCOUNTER — LAB VISIT (OUTPATIENT)
Dept: LAB | Facility: HOSPITAL | Age: 48
End: 2023-09-06
Payer: COMMERCIAL

## 2023-09-06 VITALS
HEIGHT: 71 IN | DIASTOLIC BLOOD PRESSURE: 77 MMHG | SYSTOLIC BLOOD PRESSURE: 128 MMHG | WEIGHT: 206.81 LBS | HEART RATE: 94 BPM | BODY MASS INDEX: 28.95 KG/M2

## 2023-09-06 DIAGNOSIS — G40.219 LOCALIZATION-RELATED SYMPTOMATIC EPILEPSY AND EPILEPTIC SYNDROMES WITH COMPLEX PARTIAL SEIZURES, INTRACTABLE, WITHOUT STATUS EPILEPTICUS: Primary | ICD-10-CM

## 2023-09-06 DIAGNOSIS — Z60.4 ISOLATION, SOCIAL: ICD-10-CM

## 2023-09-06 DIAGNOSIS — F06.30 MOOD DISORDER DUE TO MEDICAL CONDITION: ICD-10-CM

## 2023-09-06 DIAGNOSIS — Z96.89 S/P PLACEMENT OF VNS (VAGUS NERVE STIMULATION) DEVICE: ICD-10-CM

## 2023-09-06 DIAGNOSIS — F07.0 TEMPORAL LOBECTOMY BEHAVIOR SYNDROME: ICD-10-CM

## 2023-09-06 DIAGNOSIS — G40.219 LOCALIZATION-RELATED SYMPTOMATIC EPILEPSY AND EPILEPTIC SYNDROMES WITH COMPLEX PARTIAL SEIZURES, INTRACTABLE, WITHOUT STATUS EPILEPTICUS: ICD-10-CM

## 2023-09-06 LAB
ALBUMIN SERPL BCP-MCNC: 4.2 G/DL (ref 3.5–5.2)
ALP SERPL-CCNC: 69 U/L (ref 55–135)
ALT SERPL W/O P-5'-P-CCNC: 37 U/L (ref 10–44)
ANION GAP SERPL CALC-SCNC: 9 MMOL/L (ref 8–16)
AST SERPL-CCNC: 24 U/L (ref 10–40)
BASOPHILS # BLD AUTO: 0.02 K/UL (ref 0–0.2)
BASOPHILS NFR BLD: 0.4 % (ref 0–1.9)
BILIRUB SERPL-MCNC: 0.3 MG/DL (ref 0.1–1)
BUN SERPL-MCNC: 11 MG/DL (ref 6–20)
CALCIUM SERPL-MCNC: 9.7 MG/DL (ref 8.7–10.5)
CHLORIDE SERPL-SCNC: 102 MMOL/L (ref 95–110)
CO2 SERPL-SCNC: 30 MMOL/L (ref 23–29)
CREAT SERPL-MCNC: 1 MG/DL (ref 0.5–1.4)
DIFFERENTIAL METHOD: NORMAL
EOSINOPHIL # BLD AUTO: 0 K/UL (ref 0–0.5)
EOSINOPHIL NFR BLD: 0.6 % (ref 0–8)
ERYTHROCYTE [DISTWIDTH] IN BLOOD BY AUTOMATED COUNT: 13.2 % (ref 11.5–14.5)
EST. GFR  (NO RACE VARIABLE): >60 ML/MIN/1.73 M^2
GLUCOSE SERPL-MCNC: 96 MG/DL (ref 70–110)
HCT VFR BLD AUTO: 46.8 % (ref 40–54)
HGB BLD-MCNC: 15.3 G/DL (ref 14–18)
IMM GRANULOCYTES # BLD AUTO: 0.02 K/UL (ref 0–0.04)
IMM GRANULOCYTES NFR BLD AUTO: 0.4 % (ref 0–0.5)
LYMPHOCYTES # BLD AUTO: 1 K/UL (ref 1–4.8)
LYMPHOCYTES NFR BLD: 20.8 % (ref 18–48)
MCH RBC QN AUTO: 30.5 PG (ref 27–31)
MCHC RBC AUTO-ENTMCNC: 32.7 G/DL (ref 32–36)
MCV RBC AUTO: 93 FL (ref 82–98)
MONOCYTES # BLD AUTO: 0.3 K/UL (ref 0.3–1)
MONOCYTES NFR BLD: 5.6 % (ref 4–15)
NEUTROPHILS # BLD AUTO: 3.5 K/UL (ref 1.8–7.7)
NEUTROPHILS NFR BLD: 72.2 % (ref 38–73)
NRBC BLD-RTO: 0 /100 WBC
PLATELET # BLD AUTO: 191 K/UL (ref 150–450)
PMV BLD AUTO: 9.8 FL (ref 9.2–12.9)
POTASSIUM SERPL-SCNC: 4.1 MMOL/L (ref 3.5–5.1)
PROT SERPL-MCNC: 7.8 G/DL (ref 6–8.4)
RBC # BLD AUTO: 5.02 M/UL (ref 4.6–6.2)
SODIUM SERPL-SCNC: 141 MMOL/L (ref 136–145)
WBC # BLD AUTO: 4.86 K/UL (ref 3.9–12.7)

## 2023-09-06 PROCEDURE — 99215 PR OFFICE/OUTPT VISIT, EST, LEVL V, 40-54 MIN: ICD-10-PCS | Mod: S$GLB,,,

## 2023-09-06 PROCEDURE — 3074F SYST BP LT 130 MM HG: CPT | Mod: CPTII,S$GLB,,

## 2023-09-06 PROCEDURE — 80053 COMPREHEN METABOLIC PANEL: CPT

## 2023-09-06 PROCEDURE — 80339 ANTIEPILEPTICS NOS 1-3: CPT

## 2023-09-06 PROCEDURE — 80235 DRUG ASSAY LACOSAMIDE: CPT

## 2023-09-06 PROCEDURE — 3074F PR MOST RECENT SYSTOLIC BLOOD PRESSURE < 130 MM HG: ICD-10-PCS | Mod: CPTII,S$GLB,,

## 2023-09-06 PROCEDURE — 85025 COMPLETE CBC W/AUTO DIFF WBC: CPT

## 2023-09-06 PROCEDURE — 95976 PR ELEC ANALYSIS, IMPLT NEURO PULSE GEN, W/PRGRM, SMPL CRAN NERVE: ICD-10-PCS | Mod: S$GLB,,,

## 2023-09-06 PROCEDURE — 99215 OFFICE O/P EST HI 40 MIN: CPT | Mod: S$GLB,,,

## 2023-09-06 PROCEDURE — 3078F PR MOST RECENT DIASTOLIC BLOOD PRESSURE < 80 MM HG: ICD-10-PCS | Mod: CPTII,S$GLB,,

## 2023-09-06 PROCEDURE — 3008F PR BODY MASS INDEX (BMI) DOCUMENTED: ICD-10-PCS | Mod: CPTII,S$GLB,,

## 2023-09-06 PROCEDURE — 36415 COLL VENOUS BLD VENIPUNCTURE: CPT

## 2023-09-06 PROCEDURE — 80175 DRUG SCREEN QUAN LAMOTRIGINE: CPT

## 2023-09-06 PROCEDURE — 3078F DIAST BP <80 MM HG: CPT | Mod: CPTII,S$GLB,,

## 2023-09-06 PROCEDURE — 80299 QUANTITATIVE ASSAY DRUG: CPT | Mod: 91

## 2023-09-06 PROCEDURE — 3008F BODY MASS INDEX DOCD: CPT | Mod: CPTII,S$GLB,,

## 2023-09-06 PROCEDURE — 1159F MED LIST DOCD IN RCRD: CPT | Mod: CPTII,S$GLB,,

## 2023-09-06 PROCEDURE — 95976 ALYS SMPL CN NPGT PRGRMG: CPT | Mod: S$GLB,,,

## 2023-09-06 PROCEDURE — 99999 PR PBB SHADOW E&M-EST. PATIENT-LVL IV: ICD-10-PCS | Mod: PBBFAC,,,

## 2023-09-06 PROCEDURE — 1159F PR MEDICATION LIST DOCUMENTED IN MEDICAL RECORD: ICD-10-PCS | Mod: CPTII,S$GLB,,

## 2023-09-06 PROCEDURE — 99999 PR PBB SHADOW E&M-EST. PATIENT-LVL IV: CPT | Mod: PBBFAC,,,

## 2023-09-06 SDOH — SOCIAL DETERMINANTS OF HEALTH (SDOH): SOCIAL EXCLUSION AND REJECTION: Z60.4

## 2023-09-06 NOTE — PROGRESS NOTES
CC: Epilepsy    Interval Events/ROS 9/6/2023:    Accompanied by father and wife Jenelle (via phone) who also contribute to history.     Current ASM/SEs: lacosamide 200 mg BID, clobazam 20 mg BID, perampanel 6 mg daily, lamotrigine 600 mg BID, and VNS; no SE reported  Breakthrough seizures/events: last sz 8/2, prior to this 7/6 (was on a cruise, possibly triggered by heat/dehydration)   Driving: no  Sleep: fair  Mood: father and wife are pleased to report his mood has moderately improved, patient agrees though still notes feelings of depression sometimes; sertraline 200mg qd; still working on finding a therapist     Otherwise, no fever, no cold symptoms, no headache, no changes in vision, no new weakness, no chest pain, no shortness of breath, no nausea, no vomiting, no diarrhea, no constipation, no tingling/numbness, no problems walking.    Recent Labs   Lab 05/14/21  1155 04/07/22  1445 01/18/23  1207   Lamotrigine Lvl 14.1 15.9 H 15.7 H   Lacosamide 11.1 H 7.7 8.5   Clobazam 859.0 H 438.0 H 537.0 H   Desmethylclobazam 5810.0 H 3050.0 H 4710.0 H   Perampanel (Fycompa) Quant 140  --  153 L      VNS changes today:   Make/model: AspireSR M106  Implant date: 2/21/2019  Battery life: 25-50%  OC/SF/PW/On/Off  Initial   1.0/30/500/30s/5m  AS 1.125/500/30s  M 1.25/500/60s  Final   1.25/30/500/30s/5m  AS 1.375/500/30s  M 1.5/500/60s    Interval Events/ROS 5/30/2023:    Accompanied by father and fiance Jenelle (via phone) who also contribute to history.     Current ASM/SE: lacosamide 200 mg BID, clobazam 20 mg BID, perampanel 6 mg daily, lamotrigine 600 mg BID, and VNS; no SE reported  Breakthrough seizures/ events: about 6 seizures in the last 3 months; last event was 5/28; a couple of these were triggered by missed meds, but the others have no identifiable trigger  Driving: no  Sleep: 6 hours/ night  Mood: not great, still experiencing feelings of depression, anxiety, and loneliness with sertraline 100 mg once daily; not  established with psychiatrist or therapist currently      The patient notes that his memory functions have decreased since last visit. Otherwise, no fever, no cold symptoms, no headache, no changes in vision, no new weakness, no chest pain, no shortness of breath, no nausea, no vomiting, no diarrhea, no constipation, no tingling/ numbness, no problems walking.    Interval Events/ROS 2/28/2023:    Current ASM/SEs: lacosamide 200mg BID, clobazam 20mg BID, perampanel 6mg daily, lamotrigine 600mg BID, and a VNS  Breakthrough seizures/events: no seizures since last visit   Driving: no  Sleep: good   Mood: started sertraline 100mg four weeks ago     Otherwise, no fever, no cold symptoms, no headache, no changes in vision, no new weakness, no chest pain, no shortness of breath, no nausea, no vomiting, no diarrhea, no constipation, no tingling/numbness, no problems walking.     Interval Events/ROS 1/18/2023:    Accompanied by parents who also contribute to the history.     Current ASM/SEs: lacosamide 200mg BID, clobazam 20mg BID, perampanel 6mg daily, lamotrigine 600mg BID, and a VNS; SE anger/irritability  Breakthrough seizures/events: 7 seizures in the past 4 months, no identifiable trigger  Driving: no  Sleep: not great  Mood: anxious, angry, lonely; notes mild improvement w/ sertraline 50mg daily    Chronic memory issues. Otherwise, no fever, no cold symptoms, no headache, no changes in vision, no new weakness, no chest pain, no shortness of breath, no nausea, no vomiting, no diarrhea, no constipation, no tingling/numbness, no problems walking.    Interval Events/ROS 9/23/2022:    Current ASM/SEs: lacosamide 200mg BID, clobazam 20mg BID, perampanel 6mg daily, lamotrigine 600mg BID, and a VNS; SE anger   Breakthrough seizures/events: current frequency is around one per month; last event was 9/17/2022   Driving: no  Sleep: not well; up late w/ ruminating thoughts/mind racing  Mood: anxious, angry, lonely    Otherwise, no  fever, no cold symptoms, no headache, no changes in vision, no new weakness, no chest pain, no shortness of breath, no nausea, no vomiting, no diarrhea, no constipation, no tingling/numbness, no problems walking.     Interval Events/ROS 4/7/2022:  - Presents with parents who also contribute to the history  - Patient is a 47 y/o man with a hx of focal epilepsy s/p left temporal resection (2008) and a VNS (2019)  - Current seizure frequency is around one focal event with preserved awareness about every 10-14 days though reports he did have a seizure w/ impaired awareness last week that occurred at work, unsure how long it lasted though believes it lasted longer than his other events   - No missed doses of medication, no other identifiable trigger of this larger event although he mentions he did have a sinus infection at the time   - He is on a current medication regimen of lacosamide 200mg BID, clobazam 20mg BID, perampanel 6mg daily, and lamotrigine 600mg BID with no reported side effects  - Tolerating current VNS settings well  - Has questions and would like further clarification regarding neurocognitive testing results   - Otherwise, no fever, no cold symptoms, no headache, no changes in vision, no new weakness, no chest pain, no shortness of breath, no nausea, no vomiting, no diarrhea, no constipation, no tingling/numbness, no problems walking    Prior note:   Presents with father   11/18/21 (not clear), 11/27/21   Dec 3 and 9 - event of 10-15 sec dizziness   Episodes of easily irritability at work   Pt is a poor historian     Prior note:   Ramona log:   Oct 29 - GTC   Oct 30 - GTC (despite taking ativan 2 hrs before) trigger - anxiety, nervous   Today had a bout of dizziness and imbalance - this is a rare occurrence about 1-2 hrs after taking meds      Pt of Dr. Chan  Dizziness much improved    Last sz - 9/22/21 -   Semiology re-surgery - several thoughts race through mind, limb jerking    Semiology after surgery -  distracted by thoughts, less jerking      Seizure log since last visit: none  - last sz: 1/19/2021  - possible sz 4/28 ?unsure   - mostly dizziness is the issue     Current AEDs:   -  mg bid  (200mg 1 1/2 tabs bid)   - PER 6 mg q day (2mg 3 tabs q AM)   - CLOB 40 mg bid (20mg 2 tabs bid) - suggested increase at last visit 50mg bid - but patient decreased to 30mg bid due to dizziness  PCP decreased 20mg-30mg - per pt and his father, the change has decreased the dizziness over the past week  - LTG 600mg bid (200mg 3 tabs bid)  - VNS      Results for GEORGETTE MONCADA JRShahnaz (MRN [id number]) as of 5/31/2021 08:29    Ref. Range 4/24/2019 14:03 5/6/2020 11:44 7/31/2020 10:06 5/14/2021 11:55   Clobazam Latest Ref Range: 30 - 300 ng/mL 916.0 (H) 849.0 (H) 805.0 (H) 859.0 (H)   Lacosamide Latest Ref Range: 1.0 - 10.0 mcg/mL 7.5 10.2 (H) 10.5 (H) 11.1 (H)   Lamotrigine Lvl Latest Ref Range: 2.0 - 15.0 ug/mL 13.2 16.1 (H) 14.5 14.1   Perampanel (Fycompa) Quant Latest Units: ng/mL       140   Desmethylclobazam Latest Ref Range: 300 - 3000 ng/mL 6180.0 (H) 7820.0 (H) 6160.0 (H) 5810.0 (H)         Notes from last clinic visit, 3/11/2021:  Seizure log since last visit:  1/19  12/2  10/12      Results for GEORGETTE MONCADA  (MRN [id number]) as of 3/11/2021 11:29    Ref. Range 7/13/2017 12:55 4/24/2019 14:03 5/6/2020 11:44 7/31/2020 10:06   Lacosamide Latest Ref Range: 1.0 - 10.0 mcg/mL 6.8 7.5 10.2 (H) 10.5 (H)   Lamotrigine Lvl Latest Ref Range: 2.0 - 15.0 ug/mL 12.6 13.2 16.1 (H) 14.5   Perampanel (Fycompa) Quant Latest Units: ng/mL 160         Clobazam Latest Ref Range: 30 - 300 ng/mL 894 (H) 916.0 (H) 849.0 (H) 805.0 (H)   Desmethylclobazam Latest Ref Range: 300 - 3000 ng/mL 5904 (H) 6180.0 (H) 7820.0 (H) 6160.0 (H)      Current AEDs:   - LCS 300mg bid  (200mg 1 1/2 tabs bid)  - PER 6 mg q day (2mg 3 tabs q AM)   - CLOB 40 mg bid (20mg 2 tabs bid)  - LTG 600mg bid (200mg 3 tabs bid)  - VNS   Model # AspireSR  M106  Implant Date: 2/21/2019  Is Device palpable in chest wall                     Yes  Side of implant                                                L                                                       Initial               Reprogram   Output current             mA      0.675               0.675  Signal Freq                 Hz        30                    30  Pulse width                 uSec    750                  1000    Signal on time             Sec      30                    30  Signal off time             Min      10.0                 10.0    Autostimulation (AS)  Output Current            mA       0.75                 0.75  Pulse width                 uSec    750                  1000  Signal on time             Sec      60    Magnet settings  Output current             mA       0.75                 0.75  Pulse width                 uSec    750                  1000  Signal on time             Sec      60                    60     Tachycardia detect                 On/Off              On/off  Threshhold for AS       %                                         Seizure hx:   - onset at age 8 years     - RUE and head jerking movements -> GTC sz   - no hx of tongue/cheek bite or bowel incontinence; infrequently bladder incontinence  - epilepsy surgery done at [facility], [place] in 2008 - possibly less frequent per father; patient feels that he is more aware of it coming on and is related to triggers  - VNS placed in 2019 - did not make any difference per father     Notes from last clinic visit with , 8/12/2020:  Patient may have had a seizure on Jun 30.  He had a typical warning, swiped the VNS and did not lose awareness.  That is the only symptoms he experienced.  He is not experiencing any AEs from the meds. He does not have any other medical issues.    2020/05/14  He has noticed intermittent dizziness particularily with change in posture.  Dad fell his comprehension is not as good.  He reports  two seizures - 5 days ago after missed AM dose.  The second occurred at work when he went to see his supervisor. 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  Starting 2-3 weeks after last visit he began to have the intermittyent dizziness.  Lamictal level is high enough that there may be a pharmacodynamic interaction with lacosamide.  2019/12/12  Since the last visit he had only two seizures and he retained awareness with both.  He has been taking ativan when he goes to football games.  Lamictal levels have been stable and the clearance calculated from the dose/level indicates he is a rapid metabolizer.    2019/08/29  Patient reports three sz since last visit.  All have been partial with no convulsions.  He is tolerating the VNS well.  He has not been using the magnet to block seizures..  He is tolerating the medication well.  In the past he would usually have a seizure when he went to a game.  I have given him 1 mg ativan to take 1 hrs before a game and  This has worked well in preventing seizures.    2019/04/24  Doing well since last visit, no reported seizures. Tolerated initial VNS settings well. Returns today for continued VNS adjustment. Medication regimen unchanged.   2019/03/27  VNS implanted 2/21/19, tolerated procedure well. He was seen by NSGY in a virtual visit 3/7/19 for his 2 week post-op appointment, incision clean/dry/intact and well approximated at that time, and on exam today. Patient/family report no recent seizures. Continues to take Lacosamide, Clobazam, Lamotrigine, and Perampanel as prescribed. Presents today for first VNS adjustment.   2018/09/13  Patient reports 3 mild CPS since the last visit.  Last visit I gave him a schedule to increase lacosamide in two steps to 200 mg 1½ BID.  When he reached this dose he became dizzy and reduced the morning dose to 1 tab with resolution of the dizzinesss.  Last visit I discussed VNS treatment and today the patient wants to proceed with  implanting the device.  He has no other medical problems except allergies.  He does take loratadine which works on the H1 reception similar to Benadryl but has not been been implicated in lowering seizure threshhold.    2018/01/26  The patient reports 3 sz since the last visit.  The most recent may have resulted from missed dose.  No triggers evident for the other 2 sz.  The patient reports he has an aura but can not describe what he perceives.    2017/07/13  In the past 6 months the patient has experienced five seizures (about 1 per month).   The patient was having difficulty expressing himself - speech is halting and disconnected.  He has been taking ativan prior to social event or watching sports to prevent seizure.  He has been taking ativan about 2 times a month.    2016/04/19 and 2017/01/23              Since the last visit the patient has experienced one seizures (12 Jan 2017) .  Meds were increased last time and he is tolerating the new dosages well.  There was no obvious provocative reason.  He has a good strategy for compliance.  He had no concurrent medical problem and had not taken benadryl or other proconvulsive medication.    pt has had the following episodes:  2015/4/8 - Pt had just finished a walk.  Eye blinking and stereotypic hand movements.  No generalized convulsions were noted. Didn't lose consciousness.  Lasted 30s to 1 minute.  2015/6/6 - Pt was in his room.  Came to his dad to give him his phone to record what was happening.  Said he has the feeling that he's about to have a seizure.  Says there is no aura per se.  Says a thought that he's about to have a seizure pops into his head, which usually precedes his events.  Said his speech got markedly worse during the event.  Eye blinking lasted 30s to 1 minute and resolved spontaneously.  Also had stereotypic hand movements and eye blinking.  Maintained consciousness throughout episode.  Pt had no memory of the seizure, which was relayed by his  dad.     2015/6/8 - Pt was running around a track.  Said he overexerted himself over the last three laps.  Said he had another event immediately after exercise.  Again, this episode was brief (30s-1min) and was characterized by walking in circles, eye blinking, and stereotypic hand motions of rubbing his fingers. Had no intraictal recall.       Previous History (2015/4/1)              The patient was referred for consultation by Dr. Guerrero due to want to transfer care here due to long distance, patient is living [facility].  The patient was accompanied by his mother and father who provided some of the history.       Seizure surgery 2008 for epilepsy. Since then, less frequent and not as strong, still have seizure twice a month with current medication dosage. He is compliant with medication, tolerates medication well, no side effect noticed. On current regime for about 5 years, change to onfi 2-3 years ago in place of keppra.         Seizure Seminology  Seizure Type 1  Classification:   Aura - feel it is coming, his mind told him that seizure is coming  Ictus  - Nonconv -  - Conv - whole body jerking more on the right side, after a couple of jerk, he is out, first started has tongue biting and incontinence  - Duration - 2 minutes  Post-ictal  - Symptoms: can not talk, sleepiness no headache  - Duration: 5-10 minutes  Age of onset : 6 year old  Current Seizure Frequency -  Initially once week,  Last Seizure: 10 years ago     Seizure Type 2: after seizure  Classification:   Aura - feel it is coming, but not as strong as before  Ictus  - Nonconv -  - Conv - blinking and body jerk more on the arm.   - Duration - 30 sec  Post-ictal  - Symptoms: no sleepiness  - Duration: back to baseline after ictus right away  Age of onset : 29  Current Seizure Frequency -  Last Seizure: a couple weeks ago  Triggers: stress, missed meds, computer use, overexertion     Any other relevant information:  - none     PREVIOUS EVALUATIONS:     Previous EEGs:   - per 's notes, C-EEG in 2014: 'not abnormal'     Previous MRIs:  - per 's notes, MRI in 2015: 'no new changes'     Additional tests:  1)CT Scan:   2) EEG\Video Monitoring: no  3) PET Scan: no  4) Neuropsychological evaluation: no  5) DEXA Scan: no  6) Others: no     RISK FACTORS FOR SEIZURES:    1. Head Trauma:  No    2. CNS Infections:  No  3. CNS Tumors: No     4. CNS Vascular Disease: No     5. Febrile Seizures: No    6. Developmental Delay: No       7. Family History of Seizures: No    8. Birth history: FTNVD     Pregnancy/Labor/Delivery: n/a     CURRENT MEDICATIONS:   Current Medications               Current Outpatient Medications   Medication Sig Dispense Refill    cloBAZam (ONFI) 20 mg Tab TAKE TWO TABLETS BY MOUTH TWICE DAILY. GENERIC EQUIVALENT TO ONFI. 360 tablet 2    FLUARIX QUAD 4352-0755, PF, 60 mcg (15 mcg x 4)/0.5 mL Syrg vaccine     0    lacosamide (VIMPAT) 200 mg Tab tablet Take 1.5 tablets (300 mg total) by mouth every 12 (twelve) hours. 270 tablet 2    lamoTRIgine (LAMICTAL) 200 MG tablet Take 3 tablets (600 mg total) by mouth 2 (two) times daily. 540 tablet 2    LORazepam (ATIVAN) 1 MG tablet Take 1 tablet (1 mg total) by mouth as needed (seizure). 5 tablet 0    multivitamin (THERAGRAN) per tablet Take 1 tablet by mouth once daily.        perampaneL (FYCOMPA) 2 mg tablet Take 3 tablets (6 mg total) by mouth once daily. 270 tablet 2      No current facility-administered medications for this visit.         Folic acid: no     CURRENT ANTI EPILEPTIC MEDICATIONS:  See hpi     VAGAL NERVE STIMULATOR:     5/30/2023:  Model #     AspireSR M106 s/n [id number]  Implant Date    2/21/2019  Is Device palpable in chest wall  Yes   Side of implant    L           Initial  Reprogram   Output current  mA 0.875  1.0  Signal Freq          Hz 30  30  Pulse width         uSec 500  500  Signal on time      Sec 30  30  Signal off time      Min 5.0  5.0    Magnet settings  Output  current          mA 1.125  1.25  Pulse width         uSec 500  500  Signal on time         Sec 60  60    Autostimulation (AS)  Output Current          mA 1.0  1.125  Pulse width         uSec 500  500  Signal on time         Sec 30  Tachycardia detect  On  On  Threshhold for AS % 20  20    Battery: 50-75%    VNS interrogated 1/18/2023. Battery life 50-75%. No changes made. Settings as listed below.    9/23/2022  Model #     AspireSR M106 S/N [id number]  Implant Date    02/21/2019  Is Device palpable in chest wall  Yes   Side of implant    L           Initial  Reprogram   Output current  mA 0.75  0.875  Signal Freq          Hz 30  30  Pulse width         uSec 500  500  Signal on time      Sec 60  30  Signal off time      Min 5.0  5.0    Magnet settings  Output current          mA 1.0  1.125  Pulse width         uSec 500  500  Signal on time         Sec 60  60    Autostimulation (AS)  Output Current          mA 0.875  1.0  Pulse width         uSec 500  500  Signal on time         Sec 30  30  Tachycardia detect  On  On      VNS was interrogated today (4/7/2022) and the following changes were made:     Model # AspireSR M106  Implant Date: 2/21/2019  Is Device palpable in chest wall                     Yes  Side of implant                                                L                                                             Prior settings:  Output current             mA       0.675               Signal Freq                 Hz        30                      Pulse width                 uSec    500               Signal on time             Sec      30                     Signal off time             Min      5.0                   Duty cycle 10%    Autostimulation (AS)  Output Current            mA       0.75                  Pulse width                 uSec    500                   Signal on time             Sec      30    Magnet settings  Output current             mA       0.75                  Pulse width                  uSec    500                    Signal on time             Sec      60                       Tachycardia detect     On/Off                Threshhold for AS       %                                      Lead impedence 3289 Ohms  Generator battery: 50-75 %     New settings:                                                         Output current             mA       0.75               Signal Freq                 Hz        30                      Pulse width                 uSec    500               Signal on time             Sec      60                     Signal off time             Min      5.0                   Duty cycle 18%    Autostimulation (AS)  Output Current            mA       0.875                  Pulse width                 uSec    500                   Signal on time             Sec      30    Magnet settings  Output current             mA       1.0                  Pulse width                 uSec    500                    Signal on time             Sec      60                       Tachycardia detect     On/Off                Threshhold for AS       %                                      Generator battery: 50-75%     PRIOR ANTICONVULSANT HISTORY:   carbamazepine (Tegretol, CBZ):        tried in the past  ethosuximide (Zarontin, ESM):           tried in the past  felbamate (felbatol, FBM):                  tried in the past  gabapentin (Neurontin, GPN):            tried in the past  levetiracetam (Keppra, LEV):             tried in the past  phenytoin (Dilantin, PHT):                  tried in the past  primidone (Mysoline, PRM):               tried in the past  topiramate (Topamax, TPM):             tried in the past  valproic acid (Depakote, VPA):          tried in the past   clorazepate (Tranxene, CLZ):            Stopped previous visits.       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    Environmental and seasonal allergies 09/13/2018    Epilepsy 02/21/2019    S/P placement of VNS (vagus nerve stimulation) device 03/27/2019              Resolved Ambulatory Problems     Diagnosis Date Noted    No Resolved Ambulatory Problems      No Additional Past Medical History      PAST PSYCHIATRIC HISTORY:  no     PAST SURGICAL HISTORY including Epilepsy surgery:             Past Surgical History:   Procedure Laterality Date    epilepsy surgery Left 2008    VAGUS NERVE STIMULATOR INSERTION Left 2/21/2019     Procedure: INSERTION, VAGUS NERVE STIMULATOR;  Surgeon: Teddy Saldana MD;  Location: [facility] OR [address] [place] [address];  Service: Neurosurgery;  Laterality: Left;  toronto II, asa 2, regular bed, supine,      FAMILY HISTORY:             Family History   Problem Relation Age of Onset    Diabetes Father      Cancer Father      Diabetes Sister      Cancer Paternal Aunt      Diabetes Paternal Uncle      Cancer Paternal Grandmother      Diabetes Paternal Grandfather      Cancer Paternal Grandfather         SOCIAL HISTORY:               Social History                   Socioeconomic History    Marital status: Single       Spouse name: Not on file    Number of children: Not on file    Years of education: Not on file    Highest education level: Not on file   Occupational History    Not on file   Tobacco Use    Smoking status: Never Smoker   Substance and Sexual Activity    Alcohol use: No    Drug use: No    Sexual activity: Not on file   Other Topics Concern    Not on file   Social History Narrative    Not on file      Social Determinants of Health            Financial Resource Strain:     Difficulty of Paying Living Expenses:    Food Insecurity:     Worried About Running Out of Food in the Last Year:     Ran Out of Food in the Last Year:    Transportation Needs:     Lack of Transportation (Medical):      "Lack of Transportation (Non-Medical):    Physical Activity:     Days of Exercise per Week:     Minutes of Exercise per Session:    Stress:     Feeling of Stress :    Social Connections:     Frequency of Communication with Friends and Family:     Frequency of Social Gatherings with Friends and Family:     Attends Denominational Services:     Active Member of Clubs or Organizations:     Attends Club or Organization Meetings:     Marital Status:             a) Marital status: Single                                                    b) Living situation: patient lives with parents  c) Employed/Unemployed/Other: Employed full time  - works in a [employer], [place]     DRIVING HISTORY:  Currently driving: No       LEVEL OF EDUCATION: Masters in Industrial education     SUBSTANCE USE: none     ALLERGIES: Patient has no known allergies.      REVIEW OF SYSTEMS:  Review of Systems     EXAM:   Vitals:    09/06/23 1251   BP: 128/77   BP Location: Left arm   Patient Position: Sitting   Pulse: 94   Weight: 93.8 kg (206 lb 12.7 oz)   Height: 5' 11" (1.803 m)     - General: Awake, cooperative, NAD.  - HEENT: NC/AT  - Neck: Supple  - Pulmonary: no increased WOB  - Cardiac: well perfused   - Abdomen: soft, nontender, nondistended  - Extremities: no edema  - Skin: no rashes or lesions noted     NEURO EXAM:   - Mental Status: Awake, alert, oriented x 3. Attentive to examiner. Language is occasionally hesitant with intact repetition and comprehension. Normal prosody. There were no paraphasic errors. Able to name both high and low frequency objects. Speech was not dysarthric. Able to follow both midline and appendicular commands. There was no evidence of apraxia or neglect.     - Cranial Nerves:  VFF. EOMI. No facial droop. Hearing intact to room voice. 5/5 strength in trapezii and SCM bilaterally. Tongue protrudes in midline and to either side with no evidence of atrophy or weakness.     - Motor: Normal bulk and tone " throughout. No pronator drift bilaterally. No adventitious movements such as tremor or asterixis noted.     Delt Bic Tri WrE WrF  FFl FE IO IP Quad Ham TA Gastroc   R   5     5    5    5    5        5   5    5   5    5        5     5      5            L   5      5    5   5    5        5    5   5    5    5       5     5      5               - Sensory: No deficits to light touch.   - Coordination: No dysmetria on FNF  - Gait: Good initiation. Narrow-based, normal stride and arm swing. Romberg negative    IMPRESSION:   Focal epilepsy, onset at age 8 years  intractable s/p epi surgery at [facility] in [place] (left temporal resection, 2008) and VNS (2019)  Speech fluency impaired   Feels irritable when overstimulated by people around him talking      Plan:   - continue lacosamide 200mg BID  - continue perampanel 6mg qhs  - continue clobazam 20mg BID   - continue lamotrigine 600mg BID    - levels/labs today  - consider increasing clobazam if escalation of ASMs is needed  - increased VNS output today; changes made are summarized above   - consider increasing VNS output again at next visit   - continue sertraline 200 mg daily   - chronic mood disorder -> encouraged to continue working on finding a therapist, resources provided  - follow up in 6 months or sooner with any issues  - advised to reach out over the portal with any concerns     Patient and family are comfortable with plan and verbalizes agreement. All questions and concerns are addressed at this time.     Ashely Peoples PA-C   Neurology-Epilepsy  [facility]    Collaborating physician, Dr. Harvey Roberts, was available during today's encounter.     I spent approximately 64 minutes on the day of this encounter preparing to see the patient, obtaining and reviewing history and results, performing a medically appropriate exam, counseling and educating the patient/family/caregiver, documenting clinical information, coordinating care, and ordering  medications, tests, procedures, and referrals.

## 2023-09-06 NOTE — PATIENT INSTRUCTIONS
You came to [facility] because of your seizure disorder.  Your seizures are well controlled on lacosamide 200 mg twice daily, clobazam 20 mg twice daily, perampanel 6 mg daily, lamotrigine 600 mg twice daily.  Please continue the same medications at the same dose.     Do not miss any doses of medication. If a dose of medication is missed, take it as soon as it is remembered even if that means doubling up on the dose. Please get a lab test to check out the blood level of medication.     If you would like to find a therapist in your area, please try the following resource.  You just need to put in your zip code and some optional information, and it will list therapists in your area.    [email or web address]    We increased your VNS today. You may experience a cough for the next two days. If you experience any pain, please let us know and we can adjust it back down.     Get regular sleep. Go to sleep at the same time and wake up at the same time every day. People with epilepsy require more sleep than people without epilepsy.  Sleeping 10-12 hours a day can be normal for a person with epilepsy.  Every seizure makes it harder to prevent the next seizure. Epilepsy is associated with SUDEP, or sudden unexpected death in epilepsy.  The risk is significantly higher if convulsive seizures are not well controlled. For more information, check out these websites: https://www.epilepsy.com/, https://www.epilepsy[facility].org/, www.[place]-epilepsy.org. If you are interested in meeting other individuals in our epilepsy community, please reach out to the [facility] [place] ([phone], [phone], info@[email or web address]).  They organize many informative and fun activities in the region.  They can provide you invaluable information on how to get access to resources available for patients living with epilepsy as well as a rich community of like-minded individuals who are all learning to cope with the  same issues.  It is very important to remember, you are not alone.     Per [place] law, no episodes of loss of consciousness for 6 months before driving.  Avoid dangerous situations.  For example, no baths/pools alone, no heights, no power tools.  Wear a bike helmet.  If breakthrough seizures occur that are different in character, frequency, or duration from normal episodes, please patient portal me or call the office and we will decide the next steps. If multiple seizures occur in a row without return back to baseline, 911 needs to be called.     Return to clinic in 6 months or sooner with issues.  Please patient portal with any questions or concerns.    Ashely Peoples PA-C   Neurology-Epilepsy  [facility]

## 2023-09-08 LAB
CLOBAZAM SERPL-MCNC: 430 NG/ML (ref 30–300)
LACOSAMIDE: 6.7 MCG/ML (ref 1–10)
NORCLOBAZAM SERPL-MCNC: 8030 NG/ML (ref 300–3000)

## 2023-09-09 LAB — LAMOTRIGINE SERPL-MCNC: 11.5 UG/ML (ref 2–15)

## 2023-09-15 LAB — PERAMPANEL SERPL-MCNC: 149 NG/ML (ref 180–980)

## 2023-10-12 DIAGNOSIS — G40.219 LOCALIZATION-RELATED SYMPTOMATIC EPILEPSY AND EPILEPTIC SYNDROMES WITH COMPLEX PARTIAL SEIZURES, INTRACTABLE, WITHOUT STATUS EPILEPTICUS: ICD-10-CM

## 2023-10-12 RX ORDER — CLOBAZAM 20 MG/1
20 TABLET ORAL 2 TIMES DAILY
Qty: 180 TABLET | Refills: 1 | Status: SHIPPED | OUTPATIENT
Start: 2023-10-12 | End: 2024-03-20 | Stop reason: SDUPTHER

## 2023-10-12 RX ORDER — LACOSAMIDE 200 MG/1
200 TABLET ORAL EVERY 12 HOURS
Qty: 180 TABLET | Refills: 1 | Status: SHIPPED | OUTPATIENT
Start: 2023-10-12 | End: 2024-03-20 | Stop reason: SDUPTHER

## 2023-10-27 ENCOUNTER — PATIENT MESSAGE (OUTPATIENT)
Dept: NEUROLOGY | Facility: CLINIC | Age: 48
End: 2023-10-27
Payer: COMMERCIAL

## 2024-03-20 ENCOUNTER — LAB VISIT (OUTPATIENT)
Dept: LAB | Facility: HOSPITAL | Age: 49
End: 2024-03-20
Payer: COMMERCIAL

## 2024-03-20 ENCOUNTER — OFFICE VISIT (OUTPATIENT)
Dept: NEUROLOGY | Facility: CLINIC | Age: 49
End: 2024-03-20
Payer: COMMERCIAL

## 2024-03-20 VITALS
DIASTOLIC BLOOD PRESSURE: 80 MMHG | SYSTOLIC BLOOD PRESSURE: 119 MMHG | HEIGHT: 71 IN | BODY MASS INDEX: 28.05 KG/M2 | WEIGHT: 200.38 LBS | HEART RATE: 76 BPM

## 2024-03-20 DIAGNOSIS — Z98.890 HISTORY OF BRAIN SURGERY: ICD-10-CM

## 2024-03-20 DIAGNOSIS — F06.30 MOOD DISORDER DUE TO MEDICAL CONDITION: ICD-10-CM

## 2024-03-20 DIAGNOSIS — G40.219 LOCALIZATION-RELATED SYMPTOMATIC EPILEPSY AND EPILEPTIC SYNDROMES WITH COMPLEX PARTIAL SEIZURES, INTRACTABLE, WITHOUT STATUS EPILEPTICUS: ICD-10-CM

## 2024-03-20 DIAGNOSIS — Z96.89 S/P PLACEMENT OF VNS (VAGUS NERVE STIMULATION) DEVICE: ICD-10-CM

## 2024-03-20 DIAGNOSIS — F80.9 COMMUNICATION DEFICIT: ICD-10-CM

## 2024-03-20 DIAGNOSIS — G40.219 LOCALIZATION-RELATED SYMPTOMATIC EPILEPSY AND EPILEPTIC SYNDROMES WITH COMPLEX PARTIAL SEIZURES, INTRACTABLE, WITHOUT STATUS EPILEPTICUS: Primary | ICD-10-CM

## 2024-03-20 LAB
ALBUMIN SERPL BCP-MCNC: 4.5 G/DL (ref 3.5–5.2)
ALP SERPL-CCNC: 85 U/L (ref 55–135)
ALT SERPL W/O P-5'-P-CCNC: 21 U/L (ref 10–44)
ANION GAP SERPL CALC-SCNC: 10 MMOL/L (ref 8–16)
AST SERPL-CCNC: 16 U/L (ref 10–40)
BASOPHILS # BLD AUTO: 0.04 K/UL (ref 0–0.2)
BASOPHILS NFR BLD: 0.7 % (ref 0–1.9)
BILIRUB SERPL-MCNC: 0.3 MG/DL (ref 0.1–1)
BUN SERPL-MCNC: 13 MG/DL (ref 6–20)
CALCIUM SERPL-MCNC: 10.3 MG/DL (ref 8.7–10.5)
CHLORIDE SERPL-SCNC: 102 MMOL/L (ref 95–110)
CO2 SERPL-SCNC: 30 MMOL/L (ref 23–29)
CREAT SERPL-MCNC: 1 MG/DL (ref 0.5–1.4)
DIFFERENTIAL METHOD BLD: NORMAL
EOSINOPHIL # BLD AUTO: 0.1 K/UL (ref 0–0.5)
EOSINOPHIL NFR BLD: 1.2 % (ref 0–8)
ERYTHROCYTE [DISTWIDTH] IN BLOOD BY AUTOMATED COUNT: 12.7 % (ref 11.5–14.5)
EST. GFR  (NO RACE VARIABLE): >60 ML/MIN/1.73 M^2
GLUCOSE SERPL-MCNC: 52 MG/DL (ref 70–110)
HCT VFR BLD AUTO: 47.3 % (ref 40–54)
HGB BLD-MCNC: 15.5 G/DL (ref 14–18)
IMM GRANULOCYTES # BLD AUTO: 0.01 K/UL (ref 0–0.04)
IMM GRANULOCYTES NFR BLD AUTO: 0.2 % (ref 0–0.5)
LYMPHOCYTES # BLD AUTO: 1.3 K/UL (ref 1–4.8)
LYMPHOCYTES NFR BLD: 23.2 % (ref 18–48)
MCH RBC QN AUTO: 30.2 PG (ref 27–31)
MCHC RBC AUTO-ENTMCNC: 32.8 G/DL (ref 32–36)
MCV RBC AUTO: 92 FL (ref 82–98)
MONOCYTES # BLD AUTO: 0.4 K/UL (ref 0.3–1)
MONOCYTES NFR BLD: 6.5 % (ref 4–15)
NEUTROPHILS # BLD AUTO: 3.9 K/UL (ref 1.8–7.7)
NEUTROPHILS NFR BLD: 68.2 % (ref 38–73)
NRBC BLD-RTO: 0 /100 WBC
PLATELET # BLD AUTO: 218 K/UL (ref 150–450)
PMV BLD AUTO: 10.8 FL (ref 9.2–12.9)
POTASSIUM SERPL-SCNC: 4.3 MMOL/L (ref 3.5–5.1)
PROT SERPL-MCNC: 8 G/DL (ref 6–8.4)
RBC # BLD AUTO: 5.14 M/UL (ref 4.6–6.2)
SODIUM SERPL-SCNC: 142 MMOL/L (ref 136–145)
WBC # BLD AUTO: 5.73 K/UL (ref 3.9–12.7)

## 2024-03-20 PROCEDURE — 80175 DRUG SCREEN QUAN LAMOTRIGINE: CPT

## 2024-03-20 PROCEDURE — 80053 COMPREHEN METABOLIC PANEL: CPT

## 2024-03-20 PROCEDURE — 85025 COMPLETE CBC W/AUTO DIFF WBC: CPT

## 2024-03-20 PROCEDURE — 99215 OFFICE O/P EST HI 40 MIN: CPT | Mod: S$GLB,,,

## 2024-03-20 PROCEDURE — 99417 PROLNG OP E/M EACH 15 MIN: CPT | Mod: S$GLB,,,

## 2024-03-20 PROCEDURE — 1159F MED LIST DOCD IN RCRD: CPT | Mod: CPTII,S$GLB,,

## 2024-03-20 PROCEDURE — 99999 PR PBB SHADOW E&M-EST. PATIENT-LVL IV: CPT | Mod: PBBFAC,,,

## 2024-03-20 PROCEDURE — 36415 COLL VENOUS BLD VENIPUNCTURE: CPT

## 2024-03-20 PROCEDURE — 95976 ALYS SMPL CN NPGT PRGRMG: CPT | Mod: S$GLB,,,

## 2024-03-20 PROCEDURE — 80235 DRUG ASSAY LACOSAMIDE: CPT

## 2024-03-20 PROCEDURE — 80299 QUANTITATIVE ASSAY DRUG: CPT

## 2024-03-20 PROCEDURE — 3079F DIAST BP 80-89 MM HG: CPT | Mod: CPTII,S$GLB,,

## 2024-03-20 PROCEDURE — 3074F SYST BP LT 130 MM HG: CPT | Mod: CPTII,S$GLB,,

## 2024-03-20 PROCEDURE — 80339 ANTIEPILEPTICS NOS 1-3: CPT

## 2024-03-20 PROCEDURE — 3008F BODY MASS INDEX DOCD: CPT | Mod: CPTII,S$GLB,,

## 2024-03-20 RX ORDER — LAMOTRIGINE 200 MG/1
600 TABLET ORAL 2 TIMES DAILY
Qty: 540 TABLET | Refills: 3 | Status: SHIPPED | OUTPATIENT
Start: 2024-03-20 | End: 2025-03-20

## 2024-03-20 RX ORDER — LACOSAMIDE 200 MG/1
200 TABLET ORAL EVERY 12 HOURS
Qty: 180 TABLET | Refills: 1 | Status: SHIPPED | OUTPATIENT
Start: 2024-03-20 | End: 2024-09-16

## 2024-03-20 RX ORDER — SERTRALINE HYDROCHLORIDE 100 MG/1
200 TABLET, FILM COATED ORAL DAILY
Qty: 180 TABLET | Refills: 3 | Status: SHIPPED | OUTPATIENT
Start: 2024-03-20 | End: 2025-03-20

## 2024-03-20 RX ORDER — CLOBAZAM 20 MG/1
20 TABLET ORAL 2 TIMES DAILY
Qty: 180 TABLET | Refills: 1 | Status: SHIPPED | OUTPATIENT
Start: 2024-03-20 | End: 2024-09-16

## 2024-03-20 NOTE — PROGRESS NOTES
CC: Epilepsy    Interval Events/ROS 3/20/2024:    Accompanied by wife Jenelle who also contributes to the history.     Current ASM/SEs: lacosamide 200 mg BID, clobazam 20 mg BID, perampanel 6 mg daily, lamotrigine 600 mg BID, and VNS; no known SE  Breakthrough seizures/events: last seizure was 2/2024, possibly due to missed medication, otherwise had not had a seizure in many months -> last documented event was 8/2023  Driving: no  Sleep: dx w/ sleep apnea, using CPAP since 12/2023, sleeps around 6 hrs/night  Mood: feels mood has overall been good, has seen improvement though still w/ some racing thoughts, easily stressed/overwhelmed particularly when there are multiple conversations happening at once, sertraline 200mg qd     Slow to process information, difficulty w/ communication at times -> exacerbated by stress. Otherwise, no fever, no cold symptoms, no headache, no changes in vision, no new weakness, no chest pain, no shortness of breath, no nausea, no vomiting, no diarrhea, no constipation, no tingling/numbness, no problems walking.    Recent Labs   Lab 05/14/21  1155 04/07/22  1445 01/18/23  1207 09/06/23  1410   Lamotrigine Lvl 14.1 15.9 H 15.7 H 11.5   Lacosamide 11.1 H 7.7 8.5 6.7   Clobazam 859.0 H 438.0 H 537.0 H 430.0 H   Desmethylclobazam 5810.0 H 3050.0 H 4710.0 H 8030.0 H   Perampanel (Fycompa) Quant 140  --  153 L 149 L       VNS changes today:   Make/model: AspireSR M106  Implant date: 2/21/2019  Battery life: 25-50%  OC/SF/PW/On/Off  Initial   1.25/30/500/30s/5m  AS 1.375/500/30s  M 1.5/500/60s  Final   1.375/30/500/30s/5m  AS 1.5/500/30s  M 1.625/500/60s    Interval Events/ROS 9/6/2023:    Accompanied by father and wife Jenelle (via phone) who also contribute to history.     Current ASM/SEs: lacosamide 200 mg BID, clobazam 20 mg BID, perampanel 6 mg daily, lamotrigine 600 mg BID, and VNS; no SE reported  Breakthrough seizures/events: last sz 8/2, prior to this 7/6 (was on a cruise, possibly  triggered by heat/dehydration)   Driving: no  Sleep: fair  Mood: father and wife are pleased to report his mood has moderately improved, patient agrees though still notes feelings of depression sometimes; sertraline 200mg qd; still working on finding a therapist     Otherwise, no fever, no cold symptoms, no headache, no changes in vision, no new weakness, no chest pain, no shortness of breath, no nausea, no vomiting, no diarrhea, no constipation, no tingling/numbness, no problems walking.     VNS changes today:   Make/model: AspireSR M106  Implant date: 2/21/2019  Battery life: 25-50%  OC/SF/PW/On/Off  Initial   1.0/30/500/30s/5m  AS 1.125/500/30s  M 1.25/500/60s  Final   1.25/30/500/30s/5m  AS 1.375/500/30s  M 1.5/500/60s    Interval Events/ROS 5/30/2023:    Accompanied by father and salvador Almanzar (via phone) who also contribute to history.     Current ASM/SE: lacosamide 200 mg BID, clobazam 20 mg BID, perampanel 6 mg daily, lamotrigine 600 mg BID, and VNS; no SE reported  Breakthrough seizures/ events: about 6 seizures in the last 3 months; last event was 5/28; a couple of these were triggered by missed meds, but the others have no identifiable trigger  Driving: no  Sleep: 6 hours/ night  Mood: not great, still experiencing feelings of depression, anxiety, and loneliness with sertraline 100 mg once daily; not established with psychiatrist or therapist currently      The patient notes that his memory functions have decreased since last visit. Otherwise, no fever, no cold symptoms, no headache, no changes in vision, no new weakness, no chest pain, no shortness of breath, no nausea, no vomiting, no diarrhea, no constipation, no tingling/ numbness, no problems walking.    Interval Events/ROS 2/28/2023:    Current ASM/SEs: lacosamide 200mg BID, clobazam 20mg BID, perampanel 6mg daily, lamotrigine 600mg BID, and a VNS  Breakthrough seizures/events: no seizures since last visit   Driving: no  Sleep: good   Mood: started  sertraline 100mg four weeks ago     Otherwise, no fever, no cold symptoms, no headache, no changes in vision, no new weakness, no chest pain, no shortness of breath, no nausea, no vomiting, no diarrhea, no constipation, no tingling/numbness, no problems walking.     Interval Events/ROS 1/18/2023:    Accompanied by parents who also contribute to the history.     Current ASM/SEs: lacosamide 200mg BID, clobazam 20mg BID, perampanel 6mg daily, lamotrigine 600mg BID, and a VNS; SE anger/irritability  Breakthrough seizures/events: 7 seizures in the past 4 months, no identifiable trigger  Driving: no  Sleep: not great  Mood: anxious, angry, lonely; notes mild improvement w/ sertraline 50mg daily    Chronic memory issues. Otherwise, no fever, no cold symptoms, no headache, no changes in vision, no new weakness, no chest pain, no shortness of breath, no nausea, no vomiting, no diarrhea, no constipation, no tingling/numbness, no problems walking.    Interval Events/ROS 9/23/2022:    Current ASM/SEs: lacosamide 200mg BID, clobazam 20mg BID, perampanel 6mg daily, lamotrigine 600mg BID, and a VNS; SE anger   Breakthrough seizures/events: current frequency is around one per month; last event was 9/17/2022   Driving: no  Sleep: not well; up late w/ ruminating thoughts/mind racing  Mood: anxious, angry, lonely    Otherwise, no fever, no cold symptoms, no headache, no changes in vision, no new weakness, no chest pain, no shortness of breath, no nausea, no vomiting, no diarrhea, no constipation, no tingling/numbness, no problems walking.     Interval Events/ROS 4/7/2022:  - Presents with parents who also contribute to the history  - Patient is a 45 y/o man with a hx of focal epilepsy s/p left temporal resection (2008) and a VNS (2019)  - Current seizure frequency is around one focal event with preserved awareness about every 10-14 days though reports he did have a seizure w/ impaired awareness last week that occurred at work, unsure  how long it lasted though believes it lasted longer than his other events   - No missed doses of medication, no other identifiable trigger of this larger event although he mentions he did have a sinus infection at the time   - He is on a current medication regimen of lacosamide 200mg BID, clobazam 20mg BID, perampanel 6mg daily, and lamotrigine 600mg BID with no reported side effects  - Tolerating current VNS settings well  - Has questions and would like further clarification regarding neurocognitive testing results   - Otherwise, no fever, no cold symptoms, no headache, no changes in vision, no new weakness, no chest pain, no shortness of breath, no nausea, no vomiting, no diarrhea, no constipation, no tingling/numbness, no problems walking    Prior note:   Presents with father   11/18/21 (not clear), 11/27/21   Dec 3 and 9 - event of 10-15 sec dizziness   Episodes of easily irritability at work   Pt is a poor historian     Prior note:   Sz log:   Oct 29 - GTC   Oct 30 - GTC (despite taking ativan 2 hrs before) trigger - anxiety, nervous   Today had a bout of dizziness and imbalance - this is a rare occurrence about 1-2 hrs after taking meds      Pt of Dr. Chan  Dizziness much improved    Last sz - 9/22/21 -   Semiology re-surgery - several thoughts race through mind, limb jerking    Semiology after surgery - distracted by thoughts, less jerking      Seizure log since last visit: none  - last sz: 1/19/2021  - possible sz 4/28 ?unsure   - mostly dizziness is the issue     Current AEDs:   -  mg bid  (200mg 1 1/2 tabs bid)   - PER 6 mg q day (2mg 3 tabs q AM)   - CLOB 40 mg bid (20mg 2 tabs bid) - suggested increase at last visit 50mg bid - but patient decreased to 30mg bid due to dizziness  PCP decreased 20mg-30mg - per pt and his father, the change has decreased the dizziness over the past week  - LTG 600mg bid (200mg 3 tabs bid)  - VNS      Results for GEORGETTE MONCADA JR. (MRN [id number]) as of  5/31/2021 08:29    Ref. Range 4/24/2019 14:03 5/6/2020 11:44 7/31/2020 10:06 5/14/2021 11:55   Clobazam Latest Ref Range: 30 - 300 ng/mL 916.0 (H) 849.0 (H) 805.0 (H) 859.0 (H)   Lacosamide Latest Ref Range: 1.0 - 10.0 mcg/mL 7.5 10.2 (H) 10.5 (H) 11.1 (H)   Lamotrigine Lvl Latest Ref Range: 2.0 - 15.0 ug/mL 13.2 16.1 (H) 14.5 14.1   Perampanel (Fycompa) Quant Latest Units: ng/mL       140   Desmethylclobazam Latest Ref Range: 300 - 3000 ng/mL 6180.0 (H) 7820.0 (H) 6160.0 (H) 5810.0 (H)         Notes from last clinic visit, 3/11/2021:  Seizure log since last visit:  1/19  12/2  10/12      Results for GEORGETTE MONCADA JR. (MRN [id number]) as of 3/11/2021 11:29    Ref. Range 7/13/2017 12:55 4/24/2019 14:03 5/6/2020 11:44 7/31/2020 10:06   Lacosamide Latest Ref Range: 1.0 - 10.0 mcg/mL 6.8 7.5 10.2 (H) 10.5 (H)   Lamotrigine Lvl Latest Ref Range: 2.0 - 15.0 ug/mL 12.6 13.2 16.1 (H) 14.5   Perampanel (Fycompa) Quant Latest Units: ng/mL 160         Clobazam Latest Ref Range: 30 - 300 ng/mL 894 (H) 916.0 (H) 849.0 (H) 805.0 (H)   Desmethylclobazam Latest Ref Range: 300 - 3000 ng/mL 5904 (H) 6180.0 (H) 7820.0 (H) 6160.0 (H)      Current AEDs:   - LCS 300mg bid  (200mg 1 1/2 tabs bid)  - PER 6 mg q day (2mg 3 tabs q AM)   - CLOB 40 mg bid (20mg 2 tabs bid)  - LTG 600mg bid (200mg 3 tabs bid)  - VNS   Model # AspireSR M106  Implant Date: 2/21/2019  Is Device palpable in chest wall                     Yes  Side of implant                                                L                                                       Initial               Reprogram   Output current             mA      0.675               0.675  Signal Freq                 Hz        30                    30  Pulse width                 uSec    750                  1000    Signal on time             Sec      30                    30  Signal off time             Min      10.0                 10.0    Autostimulation (AS)  Output Current            mA        0.75                 0.75  Pulse width                 uSec    750                  1000  Signal on time             Sec      60    Magnet settings  Output current             mA       0.75                 0.75  Pulse width                 uSec    750                  1000  Signal on time             Sec      60                    60     Tachycardia detect                 On/Off              On/off  Threshhold for AS       %                                         Seizure hx:   - onset at age 8 years     - RUE and head jerking movements -> GTC sz   - no hx of tongue/cheek bite or bowel incontinence; infrequently bladder incontinence  - epilepsy surgery done at [facility], [place] in 2008 - possibly less frequent per father; patient feels that he is more aware of it coming on and is related to triggers  - VNS placed in 2019 - did not make any difference per father     Notes from last clinic visit with , 8/12/2020:  Patient may have had a seizure on Jun 30.  He had a typical warning, swiped the VNS and did not lose awareness.  That is the only symptoms he experienced.  He is not experiencing any AEs from the meds. He does not have any other medical issues.    2020/05/14  He has noticed intermittent dizziness particularily with change in posture.  Dad fell his comprehension is not as good.  He reports two seizures - 5 days ago after missed AM dose.  The second occurred at work when he went to see his supervisor. 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  Starting 2-3 weeks after last visit he began to have the intermittyent dizziness.  Lamictal level is high enough that there may be a pharmacodynamic interaction with lacosamide.  2019/12/12  Since the last visit he had only two seizures and he retained awareness with both.  He has been taking ativan when he goes to football games.  Lamictal levels have been stable and the clearance calculated from the dose/level indicates he is a rapid  metabolizer.    2019/08/29  Patient reports three sz since last visit.  All have been partial with no convulsions.  He is tolerating the VNS well.  He has not been using the magnet to block seizures..  He is tolerating the medication well.  In the past he would usually have a seizure when he went to a game.  I have given him 1 mg ativan to take 1 hrs before a game and  This has worked well in preventing seizures.    2019/04/24  Doing well since last visit, no reported seizures. Tolerated initial VNS settings well. Returns today for continued VNS adjustment. Medication regimen unchanged.   2019/03/27  VNS implanted 2/21/19, tolerated procedure well. He was seen by NSGY in a virtual visit 3/7/19 for his 2 week post-op appointment, incision clean/dry/intact and well approximated at that time, and on exam today. Patient/family report no recent seizures. Continues to take Lacosamide, Clobazam, Lamotrigine, and Perampanel as prescribed. Presents today for first VNS adjustment.   2018/09/13  Patient reports 3 mild CPS since the last visit.  Last visit I gave him a schedule to increase lacosamide in two steps to 200 mg 1½ BID.  When he reached this dose he became dizzy and reduced the morning dose to 1 tab with resolution of the dizzinesss.  Last visit I discussed VNS treatment and today the patient wants to proceed with implanting the device.  He has no other medical problems except allergies.  He does take loratadine which works on the H1 reception similar to Benadryl but has not been been implicated in lowering seizure threshhold.    2018/01/26  The patient reports 3 sz since the last visit.  The most recent may have resulted from missed dose.  No triggers evident for the other 2 sz.  The patient reports he has an aura but can not describe what he perceives.    2017/07/13  In the past 6 months the patient has experienced five seizures (about 1 per month).   The patient was having difficulty expressing himself - speech is  halting and disconnected.  He has been taking ativan prior to social event or watching sports to prevent seizure.  He has been taking ativan about 2 times a month.    2016/04/19 and 2017/01/23              Since the last visit the patient has experienced one seizures (12 Jan 2017) .  Meds were increased last time and he is tolerating the new dosages well.  There was no obvious provocative reason.  He has a good strategy for compliance.  He had no concurrent medical problem and had not taken benadryl or other proconvulsive medication.    pt has had the following episodes:  2015/4/8 - Pt had just finished a walk.  Eye blinking and stereotypic hand movements.  No generalized convulsions were noted. Didn't lose consciousness.  Lasted 30s to 1 minute.  2015/6/6 - Pt was in his room.  Came to his dad to give him his phone to record what was happening.  Said he has the feeling that he's about to have a seizure.  Says there is no aura per se.  Says a thought that he's about to have a seizure pops into his head, which usually precedes his events.  Said his speech got markedly worse during the event.  Eye blinking lasted 30s to 1 minute and resolved spontaneously.  Also had stereotypic hand movements and eye blinking.  Maintained consciousness throughout episode.  Pt had no memory of the seizure, which was relayed by his dad.     2015/6/8 - Pt was running around a track.  Said he overexerted himself over the last three laps.  Said he had another event immediately after exercise.  Again, this episode was brief (30s-1min) and was characterized by walking in circles, eye blinking, and stereotypic hand motions of rubbing his fingers. Had no intraictal recall.       Previous History (2015/4/1)              The patient was referred for consultation by Dr. Guerrero due to want to transfer care here due to long distance, patient is living [facility].  The patient was accompanied by his mother and father who provided some of the history.        Seizure surgery 2008 for epilepsy. Since then, less frequent and not as strong, still have seizure twice a month with current medication dosage. He is compliant with medication, tolerates medication well, no side effect noticed. On current regime for about 5 years, change to onfi 2-3 years ago in place of keppra.         Seizure Seminology  Seizure Type 1  Classification:   Aura - feel it is coming, his mind told him that seizure is coming  Ictus  - Nonconv -  - Conv - whole body jerking more on the right side, after a couple of jerk, he is out, first started has tongue biting and incontinence  - Duration - 2 minutes  Post-ictal  - Symptoms: can not talk, sleepiness no headache  - Duration: 5-10 minutes  Age of onset : 6 year old  Current Seizure Frequency -  Initially once week,  Last Seizure: 10 years ago     Seizure Type 2: after seizure  Classification:   Aura - feel it is coming, but not as strong as before  Ictus  - Nonconv -  - Conv - blinking and body jerk more on the arm.   - Duration - 30 sec  Post-ictal  - Symptoms: no sleepiness  - Duration: back to baseline after ictus right away  Age of onset : 29  Current Seizure Frequency -  Last Seizure: a couple weeks ago  Triggers: stress, missed meds, computer use, overexertion     Any other relevant information:  - none     PREVIOUS EVALUATIONS:    Previous EEGs:   - per 's notes, C-EEG in 2014: 'not abnormal'     Previous MRIs:  - per 's notes, MRI in 2015: 'no new changes'     Additional tests:  1)CT Scan:   2) EEG\Video Monitoring: no  3) PET Scan: no  4) Neuropsychological evaluation: no  5) DEXA Scan: no  6) Others: no     RISK FACTORS FOR SEIZURES:    1. Head Trauma:  No    2. CNS Infections:  No  3. CNS Tumors: No     4. CNS Vascular Disease: No     5. Febrile Seizures: No    6. Developmental Delay: No       7. Family History of Seizures: No    8. Birth history: FTNVD     Pregnancy/Labor/Delivery: n/a     CURRENT MEDICATIONS:    Current Medications               Current Outpatient Medications   Medication Sig Dispense Refill    cloBAZam (ONFI) 20 mg Tab TAKE TWO TABLETS BY MOUTH TWICE DAILY. GENERIC EQUIVALENT TO ONFI. 360 tablet 2    FLUARIX QUAD 3260-1706, PF, 60 mcg (15 mcg x 4)/0.5 mL Syrg vaccine     0    lacosamide (VIMPAT) 200 mg Tab tablet Take 1.5 tablets (300 mg total) by mouth every 12 (twelve) hours. 270 tablet 2    lamoTRIgine (LAMICTAL) 200 MG tablet Take 3 tablets (600 mg total) by mouth 2 (two) times daily. 540 tablet 2    LORazepam (ATIVAN) 1 MG tablet Take 1 tablet (1 mg total) by mouth as needed (seizure). 5 tablet 0    multivitamin (THERAGRAN) per tablet Take 1 tablet by mouth once daily.        perampaneL (FYCOMPA) 2 mg tablet Take 3 tablets (6 mg total) by mouth once daily. 270 tablet 2      No current facility-administered medications for this visit.         Folic acid: no     CURRENT ANTI EPILEPTIC MEDICATIONS:  See hpi     VAGAL NERVE STIMULATOR:     5/30/2023:  Model #     AspireSR M106 s/n [id number]  Implant Date    2/21/2019  Is Device palpable in chest wall  Yes   Side of implant    L           Initial  Reprogram   Output current  mA 0.875  1.0  Signal Freq          Hz 30  30  Pulse width         uSec 500  500  Signal on time      Sec 30  30  Signal off time      Min 5.0  5.0    Magnet settings  Output current          mA 1.125  1.25  Pulse width         uSec 500  500  Signal on time         Sec 60  60    Autostimulation (AS)  Output Current          mA 1.0  1.125  Pulse width         uSec 500  500  Signal on time         Sec 30  Tachycardia detect  On  On  Threshhold for AS % 20  20    Battery: 50-75%    VNS interrogated 1/18/2023. Battery life 50-75%. No changes made. Settings as listed below.    9/23/2022  Model #     AspireSR M106 S/N [id number]  Implant Date    02/21/2019  Is Device palpable in chest wall  Yes   Side of implant    L           Initial  Reprogram   Output current  mA 0.75  0.875  Signal Freq           Hz 30  30  Pulse width         uSec 500  500  Signal on time      Sec 60  30  Signal off time      Min 5.0  5.0    Magnet settings  Output current          mA 1.0  1.125  Pulse width         uSec 500  500  Signal on time         Sec 60  60    Autostimulation (AS)  Output Current          mA 0.875  1.0  Pulse width         uSec 500  500  Signal on time         Sec 30  30  Tachycardia detect  On  On      VNS was interrogated today (4/7/2022) and the following changes were made:     Model # AspireSR M106  Implant Date: 2/21/2019  Is Device palpable in chest wall                     Yes  Side of implant                                                L                                                             Prior settings:  Output current             mA       0.675               Signal Freq                 Hz        30                      Pulse width                 uSec    500               Signal on time             Sec      30                     Signal off time             Min      5.0                   Duty cycle 10%    Autostimulation (AS)  Output Current            mA       0.75                  Pulse width                 uSec    500                   Signal on time             Sec      30    Magnet settings  Output current             mA       0.75                  Pulse width                 uSec    500                    Signal on time             Sec      60                       Tachycardia detect     On/Off                Threshhold for AS       %                                      Lead impedence 3289 Ohms  Generator battery: 50-75 %     New settings:                                                         Output current             mA       0.75               Signal Freq                 Hz        30                      Pulse width                 uSec    500               Signal on time             Sec      60                     Signal off time             Min      5.0                   Duty  cycle 18%    Autostimulation (AS)  Output Current            mA       0.875                  Pulse width                 uSec    500                   Signal on time             Sec      30    Magnet settings  Output current             mA       1.0                  Pulse width                 uSec    500                    Signal on time             Sec      60                       Tachycardia detect     On/Off                Threshhold for AS       %                                      Generator battery: 50-75%     PRIOR ANTICONVULSANT HISTORY:   carbamazepine (Tegretol, CBZ):        tried in the past  ethosuximide (Zarontin, ESM):           tried in the past  felbamate (felbatol, FBM):                  tried in the past  gabapentin (Neurontin, GPN):            tried in the past  levetiracetam (Keppra, LEV):             tried in the past  phenytoin (Dilantin, PHT):                  tried in the past  primidone (Mysoline, PRM):               tried in the past  topiramate (Topamax, TPM):             tried in the past  valproic acid (Depakote, VPA):          tried in the past   clorazepate (Tranxene, CLZ):            Stopped previous visits.       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    Environmental and seasonal allergies 09/13/2018    Epilepsy 02/21/2019    S/P placement of VNS (vagus nerve stimulation) device 03/27/2019              Resolved Ambulatory Problems     Diagnosis Date Noted    No Resolved Ambulatory Problems      No Additional Past Medical History      PAST PSYCHIATRIC HISTORY:  no     PAST SURGICAL HISTORY including Epilepsy surgery:             Past Surgical History:   Procedure Laterality Date    epilepsy surgery Left 2008    VAGUS NERVE STIMULATOR  INSERTION Left 2/21/2019     Procedure: INSERTION, VAGUS NERVE STIMULATOR;  Surgeon: Teddy Saldana MD;  Location: [facility] OR [address] [place] [address];  Service: Neurosurgery;  Laterality: Left;  toronto II, asa 2, regular bed, supine,      FAMILY HISTORY:             Family History   Problem Relation Age of Onset    Diabetes Father      Cancer Father      Diabetes Sister      Cancer Paternal Aunt      Diabetes Paternal Uncle      Cancer Paternal Grandmother      Diabetes Paternal Grandfather      Cancer Paternal Grandfather         SOCIAL HISTORY:               Social History                   Socioeconomic History    Marital status: Single       Spouse name: Not on file    Number of children: Not on file    Years of education: Not on file    Highest education level: Not on file   Occupational History    Not on file   Tobacco Use    Smoking status: Never Smoker   Substance and Sexual Activity    Alcohol use: No    Drug use: No    Sexual activity: Not on file   Other Topics Concern    Not on file   Social History Narrative    Not on file      Social Determinants of Health            Financial Resource Strain:     Difficulty of Paying Living Expenses:    Food Insecurity:     Worried About Running Out of Food in the Last Year:     Ran Out of Food in the Last Year:    Transportation Needs:     Lack of Transportation (Medical):     Lack of Transportation (Non-Medical):    Physical Activity:     Days of Exercise per Week:     Minutes of Exercise per Session:    Stress:     Feeling of Stress :    Social Connections:     Frequency of Communication with Friends and Family:     Frequency of Social Gatherings with Friends and Family:     Attends [employer] Services:     Active Member of Clubs or Organizations:     Attends Club or Organization Meetings:     Marital Status:             a) Marital status: Single                                                    b) Living situation: patient lives with parents  c) Employed/Unemployed/Other: Employed  "full time  - works in a [employer] lab, [employer], [place]     DRIVING HISTORY:  Currently driving: No       LEVEL OF EDUCATION: Masters in Industrial education     SUBSTANCE USE: none     ALLERGIES: Patient has no known allergies.      REVIEW OF SYSTEMS:  Review of Systems     EXAM:   Vitals:    03/20/24 1249   BP: 119/80   Pulse: 76   Weight: 90.9 kg (200 lb 6.4 oz)   Height: 5' 11" (1.803 m)     - General: Awake, cooperative, NAD.  - HEENT: NC/AT  - Neck: decreased range of motion  - Pulmonary: no increased WOB  - Cardiac: well perfused   - Abdomen: soft, nontender, nondistended  - Extremities: no edema  - Skin: no rashes or lesions noted     NEURO EXAM:   - Mental Status: Awake, alert, oriented x 3. Attentive to examiner. Language is occasionally hesitant with intact repetition and comprehension. Normal prosody. There were no paraphasic errors. Able to name both high and low frequency objects. Speech was not dysarthric. Able to follow both midline and appendicular commands. There was no evidence of apraxia or neglect.     - Cranial Nerves:  VFF. EOMI. No facial droop. Hearing intact to room voice. 5/5 strength in trapezii and SCM bilaterally. Tongue protrudes in midline and to either side with no evidence of atrophy or weakness.     - Motor: Normal bulk and tone throughout. No pronator drift bilaterally. No adventitious movements such as tremor or asterixis noted.     Delt Bic Tri WrE WrF  FFl FE IO IP Quad Ham TA Gastroc   R   5     5    5    5    5        5   5    5   5    5        5     5      5            L   5      5    5   5    5        5    5   5    5    5       5     5      5               - Sensory: No deficits to light touch.   - Coordination: No dysmetria on FNF  - Gait: Good initiation. Narrow-based, normal stride and arm swing. Romberg negative    IMPRESSION:   Focal epilepsy, onset at age 8 years  intractable s/p epi surgery at [facility] in [place] (left temporal resection, 2008) and VNS " (2019)  Speech fluency impaired   Feels irritable when overstimulated by people around him talking      Plan:   - continue lacosamide 200mg BID  - continue perampanel 6mg qhs  - continue clobazam 20mg BID   - continue lamotrigine 600mg BID    - refills provided  - levels/labs today  - consider increasing clobazam if escalation of ASMs is needed  - increased VNS output today; changes made are summarized above   - referral to speech therapy for assistance in working to improve communication deficits if applicable pending evaluation    - continue sertraline 200 mg daily   - follow up in 3 months or sooner with issues, consider increasing VNS output again next visit    - advised to reach out over the portal with any concerns     Patient and wife are comfortable with plan. All questions and concerns are addressed at this time.     Ashely Peoples PA-C   Neurology-Epilepsy  [facility]    Collaborating physician, Dr. Harvey Roberts, was available during today's encounter.     I spent approximately 79 minutes on the day of this encounter preparing to see the patient, obtaining and reviewing history and results, performing a medically appropriate exam, counseling and educating the patient/family/caregiver, documenting clinical information, coordinating care, and ordering medications, tests, procedures, and referrals.

## 2024-03-20 NOTE — PATIENT INSTRUCTIONS
You came to [facility] because of your seizure disorder. Please continue the same medications at the same dose.     We also increased your VNS today.     Do not miss any doses of medication. If a dose of medication is missed, take it as soon as it is remembered even if that means doubling up on the dose. Please get a lab test to check out the blood level of medication.    I placed a referral to speech therapy to see if they can help work to improve your overall communication function and skills. They will call you to schedule this appointment.     Get regular sleep. Go to sleep at the same time and wake up at the same time every day. People with epilepsy require more sleep than people without epilepsy.  Sleeping 10-12 hours a day can be normal for a person with epilepsy.  Every seizure makes it harder to prevent the next seizure. Epilepsy is associated with SUDEP, or sudden unexpected death in epilepsy.  The risk is significantly higher if convulsive seizures are not well controlled. For more information, check out these websites: https://www.epilepsy.com/, https://www.epilepsyallianceamerica.org/, www.[place]-epilepsy.org, www.womenandepilepsy.org.  If you are interested in meeting other individuals in our epilepsy community, please reach out to the [facility] [place] ([phone], [phone], [email or web address]).  They organize many informative and fun activities in the region.  They can provide you invaluable information on how to get access to resources available for patients living with epilepsy as well as a rich community of like-minded individuals who are all learning to cope with the same issues.  It is very important to remember, you are not alone.     Per [place] law, no episodes of loss of consciousness for 6 months before driving.  Avoid dangerous situations.  For example, no baths/pools alone, no heights, no power tools.  Wear a bike helmet.  If breakthrough seizures occur that  are different in character, frequency, or duration from normal episodes, please patient portal me or call the office and we will decide the next steps. If multiple seizures occur in a row without return back to baseline, 911 needs to be called.     Return to clinic in 3 months or sooner with issues.  Please patient portal with any questions or concerns.    Ashely Peoples PA-C   Neurology-Epilepsy  [facility]

## 2024-03-21 LAB
CLOBAZAM SERPL-MCNC: 383 NG/ML (ref 30–300)
NORCLOBAZAM SERPL-MCNC: 8390 NG/ML (ref 300–3000)

## 2024-03-22 ENCOUNTER — PATIENT MESSAGE (OUTPATIENT)
Dept: NEUROLOGY | Facility: CLINIC | Age: 49
End: 2024-03-22
Payer: COMMERCIAL

## 2024-03-22 LAB — LACOSAMIDE: 8.3 MCG/ML (ref 1–10)

## 2024-03-23 LAB
LAMOTRIGINE SERPL-MCNC: 11.1 UG/ML (ref 2–15)
PERAMPANEL SERPL-MCNC: 142 NG/ML (ref 180–980)

## 2024-03-26 ENCOUNTER — PATIENT MESSAGE (OUTPATIENT)
Dept: NEUROLOGY | Facility: CLINIC | Age: 49
End: 2024-03-26
Payer: COMMERCIAL

## 2024-03-26 DIAGNOSIS — Z98.890 HISTORY OF BRAIN SURGERY: ICD-10-CM

## 2024-03-26 DIAGNOSIS — F80.9 COMMUNICATION DEFICIT: Primary | ICD-10-CM

## 2024-07-02 DIAGNOSIS — G40.219 LOCALIZATION-RELATED SYMPTOMATIC EPILEPSY AND EPILEPTIC SYNDROMES WITH COMPLEX PARTIAL SEIZURES, INTRACTABLE, WITHOUT STATUS EPILEPTICUS: ICD-10-CM

## 2024-07-02 RX ORDER — CLOBAZAM 20 MG/1
20 TABLET ORAL 2 TIMES DAILY
Qty: 180 TABLET | Refills: 1 | Status: SHIPPED | OUTPATIENT
Start: 2024-07-02 | End: 2024-12-29

## 2024-07-02 RX ORDER — LACOSAMIDE 200 MG/1
200 TABLET ORAL EVERY 12 HOURS
Qty: 180 TABLET | Refills: 1 | Status: SHIPPED | OUTPATIENT
Start: 2024-07-02 | End: 2024-12-29

## 2024-07-12 ENCOUNTER — PATIENT MESSAGE (OUTPATIENT)
Dept: NEUROLOGY | Facility: CLINIC | Age: 49
End: 2024-07-12
Payer: COMMERCIAL

## 2024-07-26 DIAGNOSIS — F80.9 COMMUNICATION DEFICIT: Primary | ICD-10-CM

## 2024-09-24 ENCOUNTER — PATIENT MESSAGE (OUTPATIENT)
Dept: NEUROLOGY | Facility: CLINIC | Age: 49
End: 2024-09-24
Payer: COMMERCIAL

## 2024-09-24 ENCOUNTER — TELEPHONE (OUTPATIENT)
Dept: NEUROLOGY | Facility: CLINIC | Age: 49
End: 2024-09-24
Payer: COMMERCIAL

## 2024-09-24 ENCOUNTER — OFFICE VISIT (OUTPATIENT)
Dept: NEUROLOGY | Facility: CLINIC | Age: 49
End: 2024-09-24
Payer: COMMERCIAL

## 2024-09-24 ENCOUNTER — PATIENT MESSAGE (OUTPATIENT)
Dept: NEUROLOGY | Facility: CLINIC | Age: 49
End: 2024-09-24

## 2024-09-24 DIAGNOSIS — Z96.89 S/P PLACEMENT OF VNS (VAGUS NERVE STIMULATION) DEVICE: ICD-10-CM

## 2024-09-24 DIAGNOSIS — F07.0 TEMPORAL LOBECTOMY BEHAVIOR SYNDROME: ICD-10-CM

## 2024-09-24 DIAGNOSIS — F80.9 COMMUNICATION DEFICIT: ICD-10-CM

## 2024-09-24 DIAGNOSIS — F06.30 MOOD DISORDER DUE TO MEDICAL CONDITION: ICD-10-CM

## 2024-09-24 DIAGNOSIS — Z98.890 HISTORY OF BRAIN SURGERY: ICD-10-CM

## 2024-09-24 DIAGNOSIS — G40.219 LOCALIZATION-RELATED SYMPTOMATIC EPILEPSY AND EPILEPTIC SYNDROMES WITH COMPLEX PARTIAL SEIZURES, INTRACTABLE, WITHOUT STATUS EPILEPTICUS: Primary | ICD-10-CM

## 2024-09-24 PROCEDURE — 99215 OFFICE O/P EST HI 40 MIN: CPT | Mod: 95,,,

## 2024-09-24 NOTE — PATIENT INSTRUCTIONS
You came to [facility] because of your seizure disorder.      I would like you to increase clobazam to 1 tablet in the morning and 2 tablets at night. If you think you need to increase slower, you can take 1 and a half tablets at night for 2 weeks and then increase again to 2 full tablets nightly. Continue all other medications at the same doses.    Do not miss any doses of medication. If a dose of medication is missed, take it as soon as it is remembered even if that means doubling up on the dose. Get regular sleep. Go to sleep at the same time and wake up at the same time every day. People with epilepsy require more sleep than people without epilepsy.  Sleeping 10-12 hours a day can be normal for a person with epilepsy.  Every seizure makes it harder to prevent the next seizure. Epilepsy is associated with SUDEP, or sudden unexpected death in epilepsy.  The risk is significantly higher if convulsive seizures are not well controlled. For more information, check out these websites: https://www.epilepsy.com/, https://www.epilepsyallianceamerica.org/, www.[place]-epilepsy.org, www.womenandepilepsy.org.  If you are interested in meeting other individuals in our epilepsy community, please reach out to the [facility] [place] ([phone], [phone], [email or web address]).  They organize many informative and fun activities in the region.  They can provide you invaluable information on how to get access to resources available for patients living with epilepsy as well as a rich community of like-minded individuals who are all learning to cope with the same issues.  It is very important to remember, you are not alone.     Per [place] law, no episodes of loss of consciousness for 6 months before driving.  Avoid dangerous situations.  For example, no baths/pools alone, no heights, no power tools.  Wear a bike helmet.  If breakthrough seizures occur that are different in character, frequency, or duration  from normal episodes, please patient portal me or call the office and we will decide the next steps. If multiple seizures occur in a row without return back to baseline, 911 needs to be called.     Return to clinic in 3 months or sooner with issues.  Please patient portal with any questions or concerns.    Ashely Peoples PA-C   Neurology-Epilepsy  [facility]

## 2024-09-24 NOTE — PROGRESS NOTES
CC: Epilepsy    Interval Events/ROS 9/24/2024:    Accompanied by wife Jenelle who also contributes to the history.     Current ASM/SEs:   lacosamide 200 mg BID  clobazam 20 mg BID  perampanel 6 mg daily  lamotrigine 600 mg BID  VNS  No side effects  Breakthrough seizures/events: breakthrough convulsion 9/20/24, no identifiable trigger, prior to this last sz 3-4 months ago, stable frequency   Driving: no  Sleep: fair  Mood: stable  Activity: speech therapy     Otherwise, no fever, no cold symptoms, no headache, no changes in vision, no new weakness, no chest pain, no shortness of breath, no nausea, no vomiting, no diarrhea, no constipation, no tingling/numbness, no problems walking.    Recent Labs   Lab 04/07/22  1445 01/18/23  1207 09/06/23  1410 03/20/24  1429   Lamotrigine Lvl 15.9 H 15.7 H 11.5 11.1   Lacosamide 7.7 8.5 6.7 8.3   Clobazam 438.0 H 537.0 H 430.0 H 383.0 H   Desmethylclobazam 3050.0 H 4710.0 H 8030.0 H 8390.0 H   Perampanel (Fycompa) Quant  --  153 L 149 L 142 L       Interval Events/ROS 3/20/2024:    Accompanied by wife Jenelle who also contributes to the history.     Current ASM/SEs: lacosamide 200 mg BID, clobazam 20 mg BID, perampanel 6 mg daily, lamotrigine 600 mg BID, and VNS; no known SE  Breakthrough seizures/events: last seizure was 2/2024, possibly due to missed medication, otherwise had not had a seizure in many months -> last documented event was 8/2023  Driving: no  Sleep: dx w/ sleep apnea, using CPAP since 12/2023, sleeps around 6 hrs/night  Mood: feels mood has overall been good, has seen improvement though still w/ some racing thoughts, easily stressed/overwhelmed particularly when there are multiple conversations happening at once, sertraline 200mg qd     Slow to process information, difficulty w/ communication at times -> exacerbated by stress. Otherwise, no fever, no cold symptoms, no headache, no changes in vision, no new weakness, no chest pain, no shortness of breath, no nausea,  no vomiting, no diarrhea, no constipation, no tingling/numbness, no problems walking.      VNS changes today:   Make/model: AurelioR M106  Implant date: 2/21/2019  Battery life: 25-50%  OC/SF/PW/On/Off  Initial   1.25/30/500/30s/5m  AS 1.375/500/30s  M 1.5/500/60s  Final   1.375/30/500/30s/5m  AS 1.5/500/30s  M 1.625/500/60s    Interval Events/ROS 9/6/2023:    Accompanied by father and wife Jenelle (via phone) who also contribute to history.     Current ASM/SEs: lacosamide 200 mg BID, clobazam 20 mg BID, perampanel 6 mg daily, lamotrigine 600 mg BID, and VNS; no SE reported  Breakthrough seizures/events: last sz 8/2, prior to this 7/6 (was on a cruise, possibly triggered by heat/dehydration)   Driving: no  Sleep: fair  Mood: father and wife are pleased to report his mood has moderately improved, patient agrees though still notes feelings of depression sometimes; sertraline 200mg qd; still working on finding a therapist     Otherwise, no fever, no cold symptoms, no headache, no changes in vision, no new weakness, no chest pain, no shortness of breath, no nausea, no vomiting, no diarrhea, no constipation, no tingling/numbness, no problems walking.     VNS changes today:   Make/model: AspireSR [id number]  Implant date: 2/21/2019  Battery life: 25-50%  OC/SF/PW/On/Off  Initial   1.0/30/500/30s/5m  AS 1.125/500/30s  M 1.25/500/60s  Final   1.25/30/500/30s/5m  AS 1.375/500/30s  M 1.5/500/60s    Interval Events/ROS 5/30/2023:    Accompanied by father and fiance Jenelle (via phone) who also contribute to history.     Current ASM/SE: lacosamide 200 mg BID, clobazam 20 mg BID, perampanel 6 mg daily, lamotrigine 600 mg BID, and VNS; no SE reported  Breakthrough seizures/ events: about 6 seizures in the last 3 months; last event was 5/28; a couple of these were triggered by missed meds, but the others have no identifiable trigger  Driving: no  Sleep: 6 hours/ night  Mood: not great, still experiencing feelings of depression, anxiety,  and loneliness with sertraline 100 mg once daily; not established with psychiatrist or therapist currently      The patient notes that his memory functions have decreased since last visit. Otherwise, no fever, no cold symptoms, no headache, no changes in vision, no new weakness, no chest pain, no shortness of breath, no nausea, no vomiting, no diarrhea, no constipation, no tingling/ numbness, no problems walking.    Interval Events/ROS 2/28/2023:    Current ASM/SEs: lacosamide 200mg BID, clobazam 20mg BID, perampanel 6mg daily, lamotrigine 600mg BID, and a VNS  Breakthrough seizures/events: no seizures since last visit   Driving: no  Sleep: good   Mood: started sertraline 100mg four weeks ago     Otherwise, no fever, no cold symptoms, no headache, no changes in vision, no new weakness, no chest pain, no shortness of breath, no nausea, no vomiting, no diarrhea, no constipation, no tingling/numbness, no problems walking.     Interval Events/ROS 1/18/2023:    Accompanied by parents who also contribute to the history.     Current ASM/SEs: lacosamide 200mg BID, clobazam 20mg BID, perampanel 6mg daily, lamotrigine 600mg BID, and a VNS; SE anger/irritability  Breakthrough seizures/events: 7 seizures in the past 4 months, no identifiable trigger  Driving: no  Sleep: not great  Mood: anxious, angry, lonely; notes mild improvement w/ sertraline 50mg daily    Chronic memory issues. Otherwise, no fever, no cold symptoms, no headache, no changes in vision, no new weakness, no chest pain, no shortness of breath, no nausea, no vomiting, no diarrhea, no constipation, no tingling/numbness, no problems walking.    Interval Events/ROS 9/23/2022:    Current ASM/SEs: lacosamide 200mg BID, clobazam 20mg BID, perampanel 6mg daily, lamotrigine 600mg BID, and a VNS; SE anger   Breakthrough seizures/events: current frequency is around one per month; last event was 9/17/2022   Driving: no  Sleep: not well; up late w/ ruminating thoughts/mind  racing  Mood: anxious, angry, lonely    Otherwise, no fever, no cold symptoms, no headache, no changes in vision, no new weakness, no chest pain, no shortness of breath, no nausea, no vomiting, no diarrhea, no constipation, no tingling/numbness, no problems walking.     Interval Events/ROS 4/7/2022:  - Presents with parents who also contribute to the history  - Patient is a 45 y/o man with a hx of focal epilepsy s/p left temporal resection (2008) and a VNS (2019)  - Current seizure frequency is around one focal event with preserved awareness about every 10-14 days though reports he did have a seizure w/ impaired awareness last week that occurred at work, unsure how long it lasted though believes it lasted longer than his other events   - No missed doses of medication, no other identifiable trigger of this larger event although he mentions he did have a sinus infection at the time   - He is on a current medication regimen of lacosamide 200mg BID, clobazam 20mg BID, perampanel 6mg daily, and lamotrigine 600mg BID with no reported side effects  - Tolerating current VNS settings well  - Has questions and would like further clarification regarding neurocognitive testing results   - Otherwise, no fever, no cold symptoms, no headache, no changes in vision, no new weakness, no chest pain, no shortness of breath, no nausea, no vomiting, no diarrhea, no constipation, no tingling/numbness, no problems walking    Prior note:   Presents with father   11/18/21 (not clear), 11/27/21   Dec 3 and 9 - event of 10-15 sec dizziness   Episodes of easily irritability at work   Pt is a poor historian     Prior note:   Sz log:   Oct 29 - GTC   Oct 30 - GTC (despite taking ativan 2 hrs before) trigger - anxiety, nervous   Today had a bout of dizziness and imbalance - this is a rare occurrence about 1-2 hrs after taking meds      Pt of Dr. Chan  Dizziness much improved    Last sz - 9/22/21 -   Semiology re-surgery - several thoughts race  through mind, limb jerking    Semiology after surgery - distracted by thoughts, less jerking      Seizure log since last visit: none  - last sz: 1/19/2021  - possible sz 4/28 ?unsure   - mostly dizziness is the issue     Current AEDs:   -  mg bid  (200mg 1 1/2 tabs bid)   - PER 6 mg q day (2mg 3 tabs q AM)   - CLOB 40 mg bid (20mg 2 tabs bid) - suggested increase at last visit 50mg bid - but patient decreased to 30mg bid due to dizziness  PCP decreased 20mg-30mg - per pt and his father, the change has decreased the dizziness over the past week  - LTG 600mg bid (200mg 3 tabs bid)  - VNS      Results for ANTWAN GEORGETTETYE CARDONA JR. (MRN [id number]) as of 5/31/2021 08:29    Ref. Range 4/24/2019 14:03 5/6/2020 11:44 7/31/2020 10:06 5/14/2021 11:55   Clobazam Latest Ref Range: 30 - 300 ng/mL 916.0 (H) 849.0 (H) 805.0 (H) 859.0 (H)   Lacosamide Latest Ref Range: 1.0 - 10.0 mcg/mL 7.5 10.2 (H) 10.5 (H) 11.1 (H)   Lamotrigine Lvl Latest Ref Range: 2.0 - 15.0 ug/mL 13.2 16.1 (H) 14.5 14.1   Perampanel (Fycompa) Quant Latest Units: ng/mL       140   Desmethylclobazam Latest Ref Range: 300 - 3000 ng/mL 6180.0 (H) 7820.0 (H) 6160.0 (H) 5810.0 (H)         Notes from last clinic visit, 3/11/2021:  Seizure log since last visit:  1/19  12/2  10/12      Results for GEORGETTE MONCADA JR. (MRN [id number]) as of 3/11/2021 11:29    Ref. Range 7/13/2017 12:55 4/24/2019 14:03 5/6/2020 11:44 7/31/2020 10:06   Lacosamide Latest Ref Range: 1.0 - 10.0 mcg/mL 6.8 7.5 10.2 (H) 10.5 (H)   Lamotrigine Lvl Latest Ref Range: 2.0 - 15.0 ug/mL 12.6 13.2 16.1 (H) 14.5   Perampanel (Fycompa) Quant Latest Units: ng/mL 160         Clobazam Latest Ref Range: 30 - 300 ng/mL 894 (H) 916.0 (H) 849.0 (H) 805.0 (H)   Desmethylclobazam Latest Ref Range: 300 - 3000 ng/mL 5904 (H) 6180.0 (H) 7820.0 (H) 6160.0 (H)      Current AEDs:   - LCS 300mg bid  (200mg 1 1/2 tabs bid)  - PER 6 mg q day (2mg 3 tabs q AM)   - CLOB 40 mg bid (20mg 2 tabs bid)  - LTG  600mg bid (200mg 3 tabs bid)  - VNS   Model # AspireSR M106  Implant Date: 2/21/2019  Is Device palpable in chest wall                     Yes  Side of implant                                                L                                                       Initial               Reprogram   Output current             mA      0.675               0.675  Signal Freq                 Hz        30                    30  Pulse width                 uSec    750                  1000    Signal on time             Sec      30                    30  Signal off time             Min      10.0                 10.0    Autostimulation (AS)  Output Current            mA       0.75                 0.75  Pulse width                 uSec    750                  1000  Signal on time             Sec      60    Magnet settings  Output current             mA       0.75                 0.75  Pulse width                 uSec    750                  1000  Signal on time             Sec      60                    60     Tachycardia detect                 On/Off              On/off  Threshhold for AS       %                                         Seizure hx:   - onset at age 8 years     - RUE and head jerking movements -> GTC sz   - no hx of tongue/cheek bite or bowel incontinence; infrequently bladder incontinence  - epilepsy surgery done at [facility], [place] in 2008 - possibly less frequent per father; patient feels that he is more aware of it coming on and is related to triggers  - VNS placed in 2019 - did not make any difference per father     Notes from last clinic visit with , 8/12/2020:  Patient may have had a seizure on Jun 30.  He had a typical warning, swiped the VNS and did not lose awareness.  That is the only symptoms he experienced.  He is not experiencing any AEs from the meds. He does not have any other medical issues.    2020/05/14  He has noticed intermittent dizziness particularily with change in posture.   Dad fell his comprehension is not as good.  He reports two seizures - 5 days ago after missed AM dose.  The second occurred at work when he went to see his supervisor. 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  Starting 2-3 weeks after last visit he began to have the intermittyent dizziness.  Lamictal level is high enough that there may be a pharmacodynamic interaction with lacosamide.  2019/12/12  Since the last visit he had only two seizures and he retained awareness with both.  He has been taking ativan when he goes to football games.  Lamictal levels have been stable and the clearance calculated from the dose/level indicates he is a rapid metabolizer.    2019/08/29  Patient reports three sz since last visit.  All have been partial with no convulsions.  He is tolerating the VNS well.  He has not been using the magnet to block seizures..  He is tolerating the medication well.  In the past he would usually have a seizure when he went to a game.  I have given him 1 mg ativan to take 1 hrs before a game and  This has worked well in preventing seizures.    2019/04/24  Doing well since last visit, no reported seizures. Tolerated initial VNS settings well. Returns today for continued VNS adjustment. Medication regimen unchanged.   2019/03/27  VNS implanted 2/21/19, tolerated procedure well. He was seen by NSGY in a virtual visit 3/7/19 for his 2 week post-op appointment, incision clean/dry/intact and well approximated at that time, and on exam today. Patient/family report no recent seizures. Continues to take Lacosamide, Clobazam, Lamotrigine, and Perampanel as prescribed. Presents today for first VNS adjustment.   2018/09/13  Patient reports 3 mild CPS since the last visit.  Last visit I gave him a schedule to increase lacosamide in two steps to 200 mg 1½ BID.  When he reached this dose he became dizzy and reduced the morning dose to 1 tab with resolution of the dizzinesss.  Last visit I discussed  VNS treatment and today the patient wants to proceed with implanting the device.  He has no other medical problems except allergies.  He does take loratadine which works on the H1 reception similar to Benadryl but has not been been implicated in lowering seizure threshhold.    2018/01/26  The patient reports 3 sz since the last visit.  The most recent may have resulted from missed dose.  No triggers evident for the other 2 sz.  The patient reports he has an aura but can not describe what he perceives.    2017/07/13  In the past 6 months the patient has experienced five seizures (about 1 per month).   The patient was having difficulty expressing himself - speech is halting and disconnected.  He has been taking ativan prior to social event or watching sports to prevent seizure.  He has been taking ativan about 2 times a month.    2016/04/19 and 2017/01/23              Since the last visit the patient has experienced one seizures (12 Jan 2017) .  Meds were increased last time and he is tolerating the new dosages well.  There was no obvious provocative reason.  He has a good strategy for compliance.  He had no concurrent medical problem and had not taken benadryl or other proconvulsive medication.    pt has had the following episodes:  2015/4/8 - Pt had just finished a walk.  Eye blinking and stereotypic hand movements.  No generalized convulsions were noted. Didn't lose consciousness.  Lasted 30s to 1 minute.  2015/6/6 - Pt was in his room.  Came to his dad to give him his phone to record what was happening.  Said he has the feeling that he's about to have a seizure.  Says there is no aura per se.  Says a thought that he's about to have a seizure pops into his head, which usually precedes his events.  Said his speech got markedly worse during the event.  Eye blinking lasted 30s to 1 minute and resolved spontaneously.  Also had stereotypic hand movements and eye blinking.  Maintained consciousness throughout episode.   Pt had no memory of the seizure, which was relayed by his dad.     2015/6/8 - Pt was running around a track.  Said he overexerted himself over the last three laps.  Said he had another event immediately after exercise.  Again, this episode was brief (30s-1min) and was characterized by walking in circles, eye blinking, and stereotypic hand motions of rubbing his fingers. Had no intraictal recall.       Previous History (2015/4/1)              The patient was referred for consultation by Dr. Guerrero due to want to transfer care here due to long distance, patient is living [facility].  The patient was accompanied by his mother and father who provided some of the history.       Seizure surgery 2008 for epilepsy. Since then, less frequent and not as strong, still have seizure twice a month with current medication dosage. He is compliant with medication, tolerates medication well, no side effect noticed. On current regime for about 5 years, change to onfi 2-3 years ago in place of keppra.         Seizure Seminology  Seizure Type 1  Classification:   Aura - feel it is coming, his mind told him that seizure is coming  Ictus  - Nonconv -  - Conv - whole body jerking more on the right side, after a couple of jerk, he is out, first started has tongue biting and incontinence  - Duration - 2 minutes  Post-ictal  - Symptoms: can not talk, sleepiness no headache  - Duration: 5-10 minutes  Age of onset : 6 year old  Current Seizure Frequency -  Initially once week,  Last Seizure: 10 years ago     Seizure Type 2: after seizure  Classification:   Aura - feel it is coming, but not as strong as before  Ictus  - Nonconv -  - Conv - blinking and body jerk more on the arm.   - Duration - 30 sec  Post-ictal  - Symptoms: no sleepiness  - Duration: back to baseline after ictus right away  Age of onset : 29  Current Seizure Frequency -  Last Seizure: a couple weeks ago  Triggers: stress, missed meds, computer use, overexertion     Any other  relevant information:  - none     PREVIOUS EVALUATIONS:    Previous EEGs:   - per 's notes, C-EEG in 2014: 'not abnormal'     Previous MRIs:  - per 's notes, MRI in 2015: 'no new changes'     Additional tests:  1)CT Scan:   2) EEG\Video Monitoring: no  3) PET Scan: no  4) Neuropsychological evaluation: no  5) DEXA Scan: no  6) Others: no     RISK FACTORS FOR SEIZURES:    1. Head Trauma:  No    2. CNS Infections:  No  3. CNS Tumors: No     4. CNS Vascular Disease: No     5. Febrile Seizures: No    6. Developmental Delay: No       7. Family History of Seizures: No    8. Birth history: FTNVD     Pregnancy/Labor/Delivery: n/a     CURRENT MEDICATIONS:   Current Medications               Current Outpatient Medications   Medication Sig Dispense Refill    cloBAZam (ONFI) 20 mg Tab TAKE TWO TABLETS BY MOUTH TWICE DAILY. GENERIC EQUIVALENT TO ONFI. 360 tablet 2    FLUARIX QUAD 9670-7020, PF, 60 mcg (15 mcg x 4)/0.5 mL Syrg vaccine     0    lacosamide (VIMPAT) 200 mg Tab tablet Take 1.5 tablets (300 mg total) by mouth every 12 (twelve) hours. 270 tablet 2    lamoTRIgine (LAMICTAL) 200 MG tablet Take 3 tablets (600 mg total) by mouth 2 (two) times daily. 540 tablet 2    LORazepam (ATIVAN) 1 MG tablet Take 1 tablet (1 mg total) by mouth as needed (seizure). 5 tablet 0    multivitamin (THERAGRAN) per tablet Take 1 tablet by mouth once daily.        perampaneL (FYCOMPA) 2 mg tablet Take 3 tablets (6 mg total) by mouth once daily. 270 tablet 2      No current facility-administered medications for this visit.         Folic acid: no     CURRENT ANTI EPILEPTIC MEDICATIONS:  See hpi     VAGAL NERVE STIMULATOR:     5/30/2023:  Model #     AspireSR M106 s/n [id number]  Implant Date    2/21/2019  Is Device palpable in chest wall  Yes   Side of implant    L           Initial  Reprogram   Output current  mA 0.875  1.0  Signal Freq          Hz 30  30  Pulse width         uSec 500  500  Signal on time      Sec 30  30  Signal off  time      Min 5.0  5.0    Magnet settings  Output current          mA 1.125  1.25  Pulse width         uSec 500  500  Signal on time         Sec 60  60    Autostimulation (AS)  Output Current          mA 1.0  1.125  Pulse width         uSec 500  500  Signal on time         Sec 30  Tachycardia detect  On  On  Threshhold for AS % 20  20    Battery: 50-75%    VNS interrogated 1/18/2023. Battery life 50-75%. No changes made. Settings as listed below.    9/23/2022  Model #     AspireSR M106 S/N [id number]  Implant Date    02/21/2019  Is Device palpable in chest wall  Yes   Side of implant    L           Initial  Reprogram   Output current  mA 0.75  0.875  Signal Freq          Hz 30  30  Pulse width         uSec 500  500  Signal on time      Sec 60  30  Signal off time      Min 5.0  5.0    Magnet settings  Output current          mA 1.0  1.125  Pulse width         uSec 500  500  Signal on time         Sec 60  60    Autostimulation (AS)  Output Current          mA 0.875  1.0  Pulse width         uSec 500  500  Signal on time         Sec 30  30  Tachycardia detect  On  On      VNS was interrogated today (4/7/2022) and the following changes were made:     Model # AspireSR M106  Implant Date: 2/21/2019  Is Device palpable in chest wall                     Yes  Side of implant                                                L                                                             Prior settings:  Output current             mA       0.675               Signal Freq                 Hz        30                      Pulse width                 uSec    500               Signal on time             Sec      30                     Signal off time             Min      5.0                   Duty cycle 10%    Autostimulation (AS)  Output Current            mA       0.75                  Pulse width                 uSec    500                   Signal on time             Sec      30    Magnet settings  Output current             mA        0.75                  Pulse width                 uSec    500                    Signal on time             Sec      60                       Tachycardia detect     On/Off                Threshhold for AS       %                                      Lead impedence 3289 Ohms  Generator battery: 50-75 %     New settings:                                                         Output current             mA       0.75               Signal Freq                 Hz        30                      Pulse width                 uSec    500               Signal on time             Sec      60                     Signal off time             Min      5.0                   Duty cycle 18%    Autostimulation (AS)  Output Current            mA       0.875                  Pulse width                 uSec    500                   Signal on time             Sec      30    Magnet settings  Output current             mA       1.0                  Pulse width                 uSec    500                    Signal on time             Sec      60                       Tachycardia detect     On/Off                Threshhold for AS       %                                      Generator battery: 50-75%     PRIOR ANTICONVULSANT HISTORY:   carbamazepine (Tegretol, CBZ):        tried in the past  ethosuximide (Zarontin, ESM):           tried in the past  felbamate (felbatol, FBM):                  tried in the past  gabapentin (Neurontin, GPN):            tried in the past  levetiracetam (Keppra, LEV):             tried in the past  phenytoin (Dilantin, PHT):                  tried in the past  primidone (Mysoline, PRM):               tried in the past  topiramate (Topamax, TPM):             tried in the past  valproic acid (Depakote, VPA):          tried in the past   clorazepate (Tranxene, CLZ):            Stopped previous visits.       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    Environmental and seasonal allergies 09/13/2018    Epilepsy 02/21/2019    S/P placement of VNS (vagus nerve stimulation) device 03/27/2019              Resolved Ambulatory Problems     Diagnosis Date Noted    No Resolved Ambulatory Problems      No Additional Past Medical History      PAST PSYCHIATRIC HISTORY:  no     PAST SURGICAL HISTORY including Epilepsy surgery:             Past Surgical History:   Procedure Laterality Date    epilepsy surgery Left 2008    VAGUS NERVE STIMULATOR INSERTION Left 2/21/2019     Procedure: INSERTION, VAGUS NERVE STIMULATOR;  Surgeon: Teddy Saldana MD;  Location: [facility] OR [address] [place] [address];  Service: Neurosurgery;  Laterality: Left;  toronto II, asa 2, regular bed, supine,      FAMILY HISTORY:             Family History   Problem Relation Age of Onset    Diabetes Father      Cancer Father      Diabetes Sister      Cancer Paternal Aunt      Diabetes Paternal Uncle      Cancer Paternal Grandmother      Diabetes Paternal Grandfather      Cancer Paternal Grandfather         SOCIAL HISTORY:               Social History                   Socioeconomic History    Marital status: Single       Spouse name: Not on file    Number of children: Not on file    Years of education: Not on file    Highest education level: Not on file   Occupational History    Not on file   Tobacco Use    Smoking status: Never Smoker   Substance and Sexual Activity    Alcohol use: No    Drug use: No    Sexual activity: Not on file   Other Topics Concern    Not on file   Social History Narrative    Not on file      Social Determinants of Health            Financial Resource Strain:     Difficulty of Paying Living Expenses:    Food Insecurity:     Worried About Running Out of Food in the Last Year:     Ran Out of Food in the Last Year:    Transportation  Needs:     Lack of Transportation (Medical):     Lack of Transportation (Non-Medical):    Physical Activity:     Days of Exercise per Week:     Minutes of Exercise per Session:    Stress:     Feeling of Stress :    Social Connections:     Frequency of Communication with Friends and Family:     Frequency of Social Gatherings with Friends and Family:     Attends Oriental orthodox Services:     Active Member of Clubs or Organizations:     Attends Club or Organization Meetings:     Marital Status:             a) Marital status: Single                                                    b) Living situation: patient lives with parents  c) Employed/Unemployed/Other: Employed full time  - works in a [employer], [place], [place]     DRIVING HISTORY:  Currently driving: No       LEVEL OF EDUCATION: Masters in Industrial education     SUBSTANCE USE: none     ALLERGIES: Patient has no known allergies.      REVIEW OF SYSTEMS:  Review of Systems     EXAM:   There were no vitals filed for this visit.    - General: Awake, cooperative, NAD.  - HEENT: NC/AT  - Neck: decreased range of motion  - Pulmonary: no increased WOB  - Cardiac: well perfused   - Abdomen: soft, nontender, nondistended  - Extremities: no edema  - Skin: no rashes or lesions noted     NEURO EXAM:   - Mental Status: Awake, alert, oriented x 3. Attentive to examiner. Language is occasionally hesitant with intact repetition and comprehension. Normal prosody. There were no paraphasic errors. Able to name both high and low frequency objects. Speech was not dysarthric. Able to follow both midline and appendicular commands. There was no evidence of apraxia or neglect.     - Cranial Nerves:  VFF. EOMI. No facial droop. Hearing intact to room voice. 5/5 strength in trapezii and SCM bilaterally. Tongue protrudes in midline and to either side with no evidence of atrophy or weakness.     - Motor: Normal bulk and tone throughout. No pronator drift bilaterally. No adventitious  movements such as tremor or asterixis noted.     Delt Bic Tri WrE WrF  FFl FE IO IP Quad Ham TA Gastroc   R   5     5    5    5    5        5   5    5   5    5        5     5      5            L   5      5    5   5    5        5    5   5    5    5       5     5      5               - Sensory: No deficits to light touch.   - Coordination: No dysmetria on FNF  - Gait: Good initiation. Narrow-based, normal stride and arm swing. Romberg negative    IMPRESSION:   Focal epilepsy, onset at age 8 years  intractable s/p epi surgery at [facility] in [place] (left temporal resection, 2008) and VNS (2019)  Speech fluency impaired   Feels irritable when overstimulated by people around him talking      Plan:   - continue lacosamide 200mg BID  - continue perampanel 6mg qhs  - increase clobazam to 20/40  - continue lamotrigine 600mg BID    - levels/labs next visit   - VNS check next in-person visit  - continue working w/ speech therapy to improve communication deficits     - continue sertraline 200 mg daily   - follow up in 3-6 months or sooner with issues  - advised to reach out over the portal with any concerns     Patient and wife are comfortable with plan. All questions and concerns are addressed at this time.     Ashely Peoples PA-C   Neurology-Epilepsy  [facility]    Collaborating physician, Dr. Harvey Roberts, was available during today's encounter.     I spent approximately 55 minutes on the day of this encounter preparing to see the patient, obtaining and reviewing history and results, performing a medically appropriate exam, counseling and educating the patient/family/caregiver, documenting clinical information, coordinating care, and ordering medications, tests, procedures, and referrals.    The patient location is: Home  The chief complaint leading to consultation is: Epilepsy  Visit type: Virtual visit with synchronous audio and video  Total time spent with patient: 41 minutes  Each patient to whom he or  she provides medical services by telemedicine is:  (1) informed of the relationship between the physician and patient and the respective role of any other health care provider with respect to management of the patient; and (2) notified that he or she may decline to receive medical services by telemedicine and may withdraw from such care at any time.

## 2024-09-24 NOTE — TELEPHONE ENCOUNTER
Called patient to inform him that appointment today with Ashely was scheduled incorrectly and that it would be a virtual appointment.

## 2024-09-26 RX ORDER — CLOBAZAM 20 MG/1
TABLET ORAL
Qty: 270 TABLET | Refills: 1 | Status: SHIPPED | OUTPATIENT
Start: 2024-09-26 | End: 2025-03-25

## 2024-09-27 ENCOUNTER — PATIENT MESSAGE (OUTPATIENT)
Dept: PHARMACY | Facility: CLINIC | Age: 49
End: 2024-09-27
Payer: COMMERCIAL

## 2024-12-11 ENCOUNTER — TELEPHONE (OUTPATIENT)
Dept: NEUROLOGY | Facility: CLINIC | Age: 49
End: 2024-12-11
Payer: COMMERCIAL

## 2024-12-11 NOTE — TELEPHONE ENCOUNTER
Called patient to schedule virtual follow up appointment with Carolina, patient's father states that he will have his wife reach out to schedule appointment.

## 2024-12-12 ENCOUNTER — TELEPHONE (OUTPATIENT)
Dept: NEUROLOGY | Facility: CLINIC | Age: 49
End: 2024-12-12
Payer: COMMERCIAL

## 2024-12-12 NOTE — TELEPHONE ENCOUNTER
Returned patient phone call to schedule virtual follow up appointment with Carolina, appointment scheduled 12/18/24 at 4:00 virtually.

## 2024-12-18 ENCOUNTER — OFFICE VISIT (OUTPATIENT)
Dept: NEUROLOGY | Facility: CLINIC | Age: 49
End: 2024-12-18
Payer: COMMERCIAL

## 2024-12-18 DIAGNOSIS — F80.9 COMMUNICATION DEFICIT: ICD-10-CM

## 2024-12-18 DIAGNOSIS — G40.219 LOCALIZATION-RELATED SYMPTOMATIC EPILEPSY AND EPILEPTIC SYNDROMES WITH COMPLEX PARTIAL SEIZURES, INTRACTABLE, WITHOUT STATUS EPILEPTICUS: Primary | ICD-10-CM

## 2024-12-18 DIAGNOSIS — F06.30 MOOD DISORDER DUE TO MEDICAL CONDITION: ICD-10-CM

## 2024-12-18 DIAGNOSIS — Z96.89 S/P PLACEMENT OF VNS (VAGUS NERVE STIMULATION) DEVICE: ICD-10-CM

## 2024-12-18 DIAGNOSIS — Z98.890 HISTORY OF BRAIN SURGERY: ICD-10-CM

## 2024-12-18 NOTE — PROGRESS NOTES
Established patient: Follow up    Patient statement: Pt states no new or concerning items to address and has not had a seizure since last visit      Falls: 0    Medications: up to date    Pharmacy: up to date    Pain Scale: 1

## 2025-01-17 DIAGNOSIS — G40.219 LOCALIZATION-RELATED SYMPTOMATIC EPILEPSY AND EPILEPTIC SYNDROMES WITH COMPLEX PARTIAL SEIZURES, INTRACTABLE, WITHOUT STATUS EPILEPTICUS: ICD-10-CM

## 2025-01-20 RX ORDER — LACOSAMIDE 200 MG/1
200 TABLET ORAL EVERY 12 HOURS
Qty: 180 TABLET | Refills: 1 | Status: SHIPPED | OUTPATIENT
Start: 2025-01-20 | End: 2025-01-21 | Stop reason: SDUPTHER

## 2025-01-20 RX ORDER — PERAMPANEL 6 MG/1
6 TABLET ORAL NIGHTLY
Qty: 90 TABLET | Refills: 1 | Status: SHIPPED | OUTPATIENT
Start: 2025-01-20 | End: 2025-01-21 | Stop reason: SDUPTHER

## 2025-01-21 DIAGNOSIS — G40.219 LOCALIZATION-RELATED SYMPTOMATIC EPILEPSY AND EPILEPTIC SYNDROMES WITH COMPLEX PARTIAL SEIZURES, INTRACTABLE, WITHOUT STATUS EPILEPTICUS: ICD-10-CM

## 2025-01-22 RX ORDER — LACOSAMIDE 200 MG/1
200 TABLET ORAL EVERY 12 HOURS
Qty: 180 TABLET | Refills: 1 | Status: SHIPPED | OUTPATIENT
Start: 2025-01-22

## 2025-02-06 DIAGNOSIS — F06.30 MOOD DISORDER DUE TO MEDICAL CONDITION: ICD-10-CM

## 2025-02-06 DIAGNOSIS — G40.219 LOCALIZATION-RELATED SYMPTOMATIC EPILEPSY AND EPILEPTIC SYNDROMES WITH COMPLEX PARTIAL SEIZURES, INTRACTABLE, WITHOUT STATUS EPILEPTICUS: ICD-10-CM

## 2025-02-06 RX ORDER — SERTRALINE HYDROCHLORIDE 100 MG/1
200 TABLET, FILM COATED ORAL DAILY
Qty: 180 TABLET | Refills: 3 | Status: SHIPPED | OUTPATIENT
Start: 2025-02-06 | End: 2026-02-06

## 2025-02-06 RX ORDER — LACOSAMIDE 200 MG/1
200 TABLET ORAL EVERY 12 HOURS
Qty: 180 TABLET | Refills: 1 | Status: SHIPPED | OUTPATIENT
Start: 2025-02-06

## 2025-02-06 RX ORDER — CLOBAZAM 20 MG/1
TABLET ORAL
Qty: 270 TABLET | Refills: 1 | Status: SHIPPED | OUTPATIENT
Start: 2025-02-06 | End: 2025-08-05

## 2025-02-06 RX ORDER — LAMOTRIGINE 200 MG/1
600 TABLET ORAL 2 TIMES DAILY
Qty: 540 TABLET | Refills: 3 | Status: SHIPPED | OUTPATIENT
Start: 2025-02-06 | End: 2026-02-06

## 2025-02-07 ENCOUNTER — TELEPHONE (OUTPATIENT)
Dept: NEUROLOGY | Facility: CLINIC | Age: 50
End: 2025-02-07
Payer: COMMERCIAL

## 2025-02-07 NOTE — TELEPHONE ENCOUNTER
Spoke to [facility] to advise that Mrs. Peoples submitted approval for pt medication to be re-shipped and asked if a verbal was still needed. [facility] advised that NO verbal is needed and his medication is being processed now.    I called and advised the pt that his medication is in process

## 2025-03-13 NOTE — PROGRESS NOTES
CC: Epilepsy    Interval Events/ROS 12/18/2024:    Current ASM/SEs:   lacosamide 200 mg BID  clobazam 20/40  perampanel 6 mg daily  lamotrigine 600 mg BID  VNS  No side effects  Breakthrough seizures/events: none since last visit/increase in clobazam  Driving: no    Otherwise, no fever, no cold symptoms, no headache, no changes in vision, no new weakness, no chest pain, no shortness of breath, no nausea, no vomiting, no diarrhea, no constipation, no tingling/numbness, no problems walking.    Recent Labs   Lab 04/07/22  1445 01/18/23  1207 09/06/23  1410 03/20/24  1429   Lamotrigine Lvl 15.9 H 15.7 H 11.5 11.1   Lacosamide 7.7 8.5 6.7 8.3   Clobazam 438.0 H 537.0 H 430.0 H 383.0 H   Desmethylclobazam 3050.0 H 4710.0 H 8030.0 H 8390.0 H   Perampanel (Fycompa) Quant  --  153 L 149 L 142 L       Interval Events/ROS 9/24/2024:    Accompanied by wife Jenelle who also contributes to the history.     Current ASM/SEs:   lacosamide 200 mg BID  clobazam 20 mg BID  perampanel 6 mg daily  lamotrigine 600 mg BID  VNS  No side effects  Breakthrough seizures/events: breakthrough convulsion 9/20/24, no identifiable trigger, prior to this last sz 3-4 months ago, stable frequency   Driving: no  Sleep: fair  Mood: stable  Activity: speech therapy     Otherwise, no fever, no cold symptoms, no headache, no changes in vision, no new weakness, no chest pain, no shortness of breath, no nausea, no vomiting, no diarrhea, no constipation, no tingling/numbness, no problems walking.      Interval Events/ROS 3/20/2024:    Accompanied by wife Jenelle who also contributes to the history.     Current ASM/SEs: lacosamide 200 mg BID, clobazam 20 mg BID, perampanel 6 mg daily, lamotrigine 600 mg BID, and VNS; no known SE  Breakthrough seizures/events: last seizure was 2/2024, possibly due to missed medication, otherwise had not had a seizure in many months -> last documented event was 8/2023  Driving: no  Sleep: dx w/ sleep apnea, using CPAP since  12/2023, sleeps around 6 hrs/night  Mood: feels mood has overall been good, has seen improvement though still w/ some racing thoughts, easily stressed/overwhelmed particularly when there are multiple conversations happening at once, sertraline 200mg qd     Slow to process information, difficulty w/ communication at times -> exacerbated by stress. Otherwise, no fever, no cold symptoms, no headache, no changes in vision, no new weakness, no chest pain, no shortness of breath, no nausea, no vomiting, no diarrhea, no constipation, no tingling/numbness, no problems walking.      VNS changes today:   Make/model: AspireSR M106  Implant date: 2/21/2019  Battery life: 25-50%  OC/SF/PW/On/Off  Initial   1.25/30/500/30s/5m  AS 1.375/500/30s  M 1.5/500/60s  Final   1.375/30/500/30s/5m  AS 1.5/500/30s  M 1.625/500/60s    Interval Events/ROS 9/6/2023:    Accompanied by father and wife Jenelle (via phone) who also contribute to history.     Current ASM/SEs: lacosamide 200 mg BID, clobazam 20 mg BID, perampanel 6 mg daily, lamotrigine 600 mg BID, and VNS; no SE reported  Breakthrough seizures/events: last sz 8/2, prior to this 7/6 (was on a cruise, possibly triggered by heat/dehydration)   Driving: no  Sleep: fair  Mood: father and wife are pleased to report his mood has moderately improved, patient agrees though still notes feelings of depression sometimes; sertraline 200mg qd; still working on finding a therapist     Otherwise, no fever, no cold symptoms, no headache, no changes in vision, no new weakness, no chest pain, no shortness of breath, no nausea, no vomiting, no diarrhea, no constipation, no tingling/numbness, no problems walking.     VNS changes today:   Make/model: AspireSR M106  Implant date: 2/21/2019  Battery life: 25-50%  OC/SF/PW/On/Off  Initial   1.0/30/500/30s/5m  AS 1.125/500/30s  M 1.25/500/60s  Final   1.25/30/500/30s/5m  AS 1.375/500/30s  M 1.5/500/60s    Interval Events/ROS 5/30/2023:    Accompanied by  father and salvador Rojasn (via phone) who also contribute to history.     Current ASM/SE: lacosamide 200 mg BID, clobazam 20 mg BID, perampanel 6 mg daily, lamotrigine 600 mg BID, and VNS; no SE reported  Breakthrough seizures/ events: about 6 seizures in the last 3 months; last event was 5/28; a couple of these were triggered by missed meds, but the others have no identifiable trigger  Driving: no  Sleep: 6 hours/ night  Mood: not great, still experiencing feelings of depression, anxiety, and loneliness with sertraline 100 mg once daily; not established with psychiatrist or therapist currently      The patient notes that his memory functions have decreased since last visit. Otherwise, no fever, no cold symptoms, no headache, no changes in vision, no new weakness, no chest pain, no shortness of breath, no nausea, no vomiting, no diarrhea, no constipation, no tingling/ numbness, no problems walking.    Interval Events/ROS 2/28/2023:    Current ASM/SEs: lacosamide 200mg BID, clobazam 20mg BID, perampanel 6mg daily, lamotrigine 600mg BID, and a VNS  Breakthrough seizures/events: no seizures since last visit   Driving: no  Sleep: good   Mood: started sertraline 100mg four weeks ago     Otherwise, no fever, no cold symptoms, no headache, no changes in vision, no new weakness, no chest pain, no shortness of breath, no nausea, no vomiting, no diarrhea, no constipation, no tingling/numbness, no problems walking.     Interval Events/ROS 1/18/2023:    Accompanied by parents who also contribute to the history.     Current ASM/SEs: lacosamide 200mg BID, clobazam 20mg BID, perampanel 6mg daily, lamotrigine 600mg BID, and a VNS; SE anger/irritability  Breakthrough seizures/events: 7 seizures in the past 4 months, no identifiable trigger  Driving: no  Sleep: not great  Mood: anxious, angry, lonely; notes mild improvement w/ sertraline 50mg daily    Chronic memory issues. Otherwise, no fever, no cold symptoms, no headache, no  changes in vision, no new weakness, no chest pain, no shortness of breath, no nausea, no vomiting, no diarrhea, no constipation, no tingling/numbness, no problems walking.    Interval Events/ROS 9/23/2022:    Current ASM/SEs: lacosamide 200mg BID, clobazam 20mg BID, perampanel 6mg daily, lamotrigine 600mg BID, and a VNS; SE anger   Breakthrough seizures/events: current frequency is around one per month; last event was 9/17/2022   Driving: no  Sleep: not well; up late w/ ruminating thoughts/mind racing  Mood: anxious, angry, lonely    Otherwise, no fever, no cold symptoms, no headache, no changes in vision, no new weakness, no chest pain, no shortness of breath, no nausea, no vomiting, no diarrhea, no constipation, no tingling/numbness, no problems walking.     Interval Events/ROS 4/7/2022:  - Presents with parents who also contribute to the history  - Patient is a 47 y/o man with a hx of focal epilepsy s/p left temporal resection (2008) and a VNS (2019)  - Current seizure frequency is around one focal event with preserved awareness about every 10-14 days though reports he did have a seizure w/ impaired awareness last week that occurred at work, unsure how long it lasted though believes it lasted longer than his other events   - No missed doses of medication, no other identifiable trigger of this larger event although he mentions he did have a sinus infection at the time   - He is on a current medication regimen of lacosamide 200mg BID, clobazam 20mg BID, perampanel 6mg daily, and lamotrigine 600mg BID with no reported side effects  - Tolerating current VNS settings well  - Has questions and would like further clarification regarding neurocognitive testing results   - Otherwise, no fever, no cold symptoms, no headache, no changes in vision, no new weakness, no chest pain, no shortness of breath, no nausea, no vomiting, no diarrhea, no constipation, no tingling/numbness, no problems walking    Prior note:   Presents  with father   11/18/21 (not clear), 11/27/21   Dec 3 and 9 - event of 10-15 sec dizziness   Episodes of easily irritability at work   Pt is a poor historian     Prior note:   Sz log:   Oct 29 - GTC   Oct 30 - GTC (despite taking ativan 2 hrs before) trigger - anxiety, nervous   Today had a bout of dizziness and imbalance - this is a rare occurrence about 1-2 hrs after taking meds      Pt of Dr. Chan  Dizziness much improved    Last sz - 9/22/21 -   Semiology re-surgery - several thoughts race through mind, limb jerking    Semiology after surgery - distracted by thoughts, less jerking      Seizure log since last visit: none  - last sz: 1/19/2021  - possible sz 4/28 ?unsure   - mostly dizziness is the issue     Current AEDs:   -  mg bid  (200mg 1 1/2 tabs bid)   - PER 6 mg q day (2mg 3 tabs q AM)   - CLOB 40 mg bid (20mg 2 tabs bid) - suggested increase at last visit 50mg bid - but patient decreased to 30mg bid due to dizziness  PCP decreased 20mg-30mg - per pt and his father, the change has decreased the dizziness over the past week  - LTG 600mg bid (200mg 3 tabs bid)  - VNS      Results for GEORGETTE MONCADA JR. (MRN [id number]) as of 5/31/2021 08:29    Ref. Range 4/24/2019 14:03 5/6/2020 11:44 7/31/2020 10:06 5/14/2021 11:55   Clobazam Latest Ref Range: 30 - 300 ng/mL 916.0 (H) 849.0 (H) 805.0 (H) 859.0 (H)   Lacosamide Latest Ref Range: 1.0 - 10.0 mcg/mL 7.5 10.2 (H) 10.5 (H) 11.1 (H)   Lamotrigine Lvl Latest Ref Range: 2.0 - 15.0 ug/mL 13.2 16.1 (H) 14.5 14.1   Perampanel (Fycompa) Quant Latest Units: ng/mL       140   Desmethylclobazam Latest Ref Range: 300 - 3000 ng/mL 6180.0 (H) 7820.0 (H) 6160.0 (H) 5810.0 (H)         Notes from last clinic visit, 3/11/2021:  Seizure log since last visit:  1/19  12/2  10/12      Results for GEORGETTE MONCADA JR. (MRN [id number]) as of 3/11/2021 11:29    Ref. Range 7/13/2017 12:55 4/24/2019 14:03 5/6/2020 11:44 7/31/2020 10:06   Lacosamide Latest Ref Range: 1.0  - 10.0 mcg/mL 6.8 7.5 10.2 (H) 10.5 (H)   Lamotrigine Lvl Latest Ref Range: 2.0 - 15.0 ug/mL 12.6 13.2 16.1 (H) 14.5   Perampanel (Fycompa) Quant Latest Units: ng/mL 160         Clobazam Latest Ref Range: 30 - 300 ng/mL 894 (H) 916.0 (H) 849.0 (H) 805.0 (H)   Desmethylclobazam Latest Ref Range: 300 - 3000 ng/mL 5904 (H) 6180.0 (H) 7820.0 (H) 6160.0 (H)      Current AEDs:   - LCS 300mg bid  (200mg 1 1/2 tabs bid)  - PER 6 mg q day (2mg 3 tabs q AM)   - CLOB 40 mg bid (20mg 2 tabs bid)  - LTG 600mg bid (200mg 3 tabs bid)  - VNS   Model # AspireSR M106  Implant Date: 2/21/2019  Is Device palpable in chest wall                     Yes  Side of implant                                                L                                                       Initial               Reprogram   Output current             mA      0.675               0.675  Signal Freq                 Hz        30                    30  Pulse width                 uSec    750                  1000    Signal on time             Sec      30                    30  Signal off time             Min      10.0                 10.0    Autostimulation (AS)  Output Current            mA       0.75                 0.75  Pulse width                 uSec    750                  1000  Signal on time             Sec      60    Magnet settings  Output current             mA       0.75                 0.75  Pulse width                 uSec    750                  1000  Signal on time             Sec      60                    60     Tachycardia detect                 On/Off              On/off  Threshhold for AS       %                                         Seizure hx:   - onset at age 8 years     - RUE and head jerking movements -> GTC sz   - no hx of tongue/cheek bite or bowel incontinence; infrequently bladder incontinence  - epilepsy surgery done at [facility], [place] in 2008 - possibly less frequent per father; patient feels that he is more aware of it  coming on and is related to triggers  - VNS placed in 2019 - did not make any difference per father     Notes from last clinic visit with , 8/12/2020:  Patient may have had a seizure on Jun 30.  He had a typical warning, swiped the VNS and did not lose awareness.  That is the only symptoms he experienced.  He is not experiencing any AEs from the meds. He does not have any other medical issues.    2020/05/14  He has noticed intermittent dizziness particularily with change in posture.  Dad fell his comprehension is not as good.  He reports two seizures - 5 days ago after missed AM dose.  The second occurred at work when he went to see his supervisor. 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  Starting 2-3 weeks after last visit he began to have the intermittyent dizziness.  Lamictal level is high enough that there may be a pharmacodynamic interaction with lacosamide.  2019/12/12  Since the last visit he had only two seizures and he retained awareness with both.  He has been taking ativan when he goes to football games.  Lamictal levels have been stable and the clearance calculated from the dose/level indicates he is a rapid metabolizer.    2019/08/29  Patient reports three sz since last visit.  All have been partial with no convulsions.  He is tolerating the VNS well.  He has not been using the magnet to block seizures..  He is tolerating the medication well.  In the past he would usually have a seizure when he went to a game.  I have given him 1 mg ativan to take 1 hrs before a game and  This has worked well in preventing seizures.    2019/04/24  Doing well since last visit, no reported seizures. Tolerated initial VNS settings well. Returns today for continued VNS adjustment. Medication regimen unchanged.   2019/03/27  VNS implanted 2/21/19, tolerated procedure well. He was seen by NSGY in a virtual visit 3/7/19 for his 2 week post-op appointment, incision clean/dry/intact and well  approximated at that time, and on exam today. Patient/family report no recent seizures. Continues to take Lacosamide, Clobazam, Lamotrigine, and Perampanel as prescribed. Presents today for first VNS adjustment.   2018/09/13  Patient reports 3 mild CPS since the last visit.  Last visit I gave him a schedule to increase lacosamide in two steps to 200 mg 1½ BID.  When he reached this dose he became dizzy and reduced the morning dose to 1 tab with resolution of the dizzinesss.  Last visit I discussed VNS treatment and today the patient wants to proceed with implanting the device.  He has no other medical problems except allergies.  He does take loratadine which works on the H1 reception similar to Benadryl but has not been been implicated in lowering seizure threshhold.    2018/01/26  The patient reports 3 sz since the last visit.  The most recent may have resulted from missed dose.  No triggers evident for the other 2 sz.  The patient reports he has an aura but can not describe what he perceives.    2017/07/13  In the past 6 months the patient has experienced five seizures (about 1 per month).   The patient was having difficulty expressing himself - speech is halting and disconnected.  He has been taking ativan prior to social event or watching sports to prevent seizure.  He has been taking ativan about 2 times a month.    2016/04/19 and 2017/01/23              Since the last visit the patient has experienced one seizures (12 Jan 2017) .  Meds were increased last time and he is tolerating the new dosages well.  There was no obvious provocative reason.  He has a good strategy for compliance.  He had no concurrent medical problem and had not taken benadryl or other proconvulsive medication.    pt has had the following episodes:  2015/4/8 - Pt had just finished a walk.  Eye blinking and stereotypic hand movements.  No generalized convulsions were noted. Didn't lose consciousness.  Lasted 30s to 1 minute.  2015/6/6 - Pt  was in his room.  Came to his dad to give him his phone to record what was happening.  Said he has the feeling that he's about to have a seizure.  Says there is no aura per se.  Says a thought that he's about to have a seizure pops into his head, which usually precedes his events.  Said his speech got markedly worse during the event.  Eye blinking lasted 30s to 1 minute and resolved spontaneously.  Also had stereotypic hand movements and eye blinking.  Maintained consciousness throughout episode.  Pt had no memory of the seizure, which was relayed by his dad.     2015/6/8 - Pt was running around a track.  Said he overexerted himself over the last three laps.  Said he had another event immediately after exercise.  Again, this episode was brief (30s-1min) and was characterized by walking in circles, eye blinking, and stereotypic hand motions of rubbing his fingers. Had no intraictal recall.       Previous History (2015/4/1)              The patient was referred for consultation by Dr. Guerrero due to want to transfer care here due to long distance, patient is living [facility].  The patient was accompanied by his mother and father who provided some of the history.       Seizure surgery 2008 for epilepsy. Since then, less frequent and not as strong, still have seizure twice a month with current medication dosage. He is compliant with medication, tolerates medication well, no side effect noticed. On current regime for about 5 years, change to onfi 2-3 years ago in place of keppra.         Seizure Seminology  Seizure Type 1  Classification:   Aura - feel it is coming, his mind told him that seizure is coming  Ictus  - Nonconv -  - Conv - whole body jerking more on the right side, after a couple of jerk, he is out, first started has tongue biting and incontinence  - Duration - 2 minutes  Post-ictal  - Symptoms: can not talk, sleepiness no headache  - Duration: 5-10 minutes  Age of onset : 6 year old  Current Seizure Frequency  -  Initially once week,  Last Seizure: 10 years ago     Seizure Type 2: after seizure  Classification:   Aura - feel it is coming, but not as strong as before  Ictus  - Nonconv -  - Conv - blinking and body jerk more on the arm.   - Duration - 30 sec  Post-ictal  - Symptoms: no sleepiness  - Duration: back to baseline after ictus right away  Age of onset : 29  Current Seizure Frequency -  Last Seizure: a couple weeks ago  Triggers: stress, missed meds, computer use, overexertion     Any other relevant information:  - none     PREVIOUS EVALUATIONS:    Previous EEGs:   - per 's notes, C-EEG in 2014: 'not abnormal'     Previous MRIs:  - per 's notes, MRI in 2015: 'no new changes'     Additional tests:  1)CT Scan:   2) EEG\Video Monitoring: no  3) PET Scan: no  4) Neuropsychological evaluation: no  5) DEXA Scan: no  6) Others: no     RISK FACTORS FOR SEIZURES:    1. Head Trauma:  No    2. CNS Infections:  No  3. CNS Tumors: No     4. CNS Vascular Disease: No     5. Febrile Seizures: No    6. Developmental Delay: No       7. Family History of Seizures: No    8. Birth history: FTNVD     Pregnancy/Labor/Delivery: n/a     CURRENT MEDICATIONS:   Current Medications               Current Outpatient Medications   Medication Sig Dispense Refill    cloBAZam (ONFI) 20 mg Tab TAKE TWO TABLETS BY MOUTH TWICE DAILY. GENERIC EQUIVALENT TO ONFI. 360 tablet 2    FLUARIX QUAD 2075-2282, PF, 60 mcg (15 mcg x 4)/0.5 mL Syrg vaccine     0    lacosamide (VIMPAT) 200 mg Tab tablet Take 1.5 tablets (300 mg total) by mouth every 12 (twelve) hours. 270 tablet 2    lamoTRIgine (LAMICTAL) 200 MG tablet Take 3 tablets (600 mg total) by mouth 2 (two) times daily. 540 tablet 2    LORazepam (ATIVAN) 1 MG tablet Take 1 tablet (1 mg total) by mouth as needed (seizure). 5 tablet 0    multivitamin (THERAGRAN) per tablet Take 1 tablet by mouth once daily.        perampaneL (FYCOMPA) 2 mg tablet Take 3 tablets (6 mg total) by mouth once  daily. 270 tablet 2      No current facility-administered medications for this visit.         Folic acid: no     CURRENT ANTI EPILEPTIC MEDICATIONS:  See hpi     VAGAL NERVE STIMULATOR:     5/30/2023:  Model #     AspireSR [id number] s/n [id number]  Implant Date    2/21/2019  Is Device palpable in chest wall  Yes   Side of implant    L           Initial  Reprogram   Output current  mA 0.875  1.0  Signal Freq          Hz 30  30  Pulse width         uSec 500  500  Signal on time      Sec 30  30  Signal off time      Min 5.0  5.0    Magnet settings  Output current          mA 1.125  1.25  Pulse width         uSec 500  500  Signal on time         Sec 60  60    Autostimulation (AS)  Output Current          mA 1.0  1.125  Pulse width         uSec 500  500  Signal on time         Sec 30  Tachycardia detect  On  On  Threshhold for AS % 20  20    Battery: 50-75%    VNS interrogated 1/18/2023. Battery life 50-75%. No changes made. Settings as listed below.    9/23/2022  Model #     AspireSR M106 S/N [id number]  Implant Date    02/21/2019  Is Device palpable in chest wall  Yes   Side of implant    L           Initial  Reprogram   Output current  mA 0.75  0.875  Signal Freq          Hz 30  30  Pulse width         uSec 500  500  Signal on time      Sec 60  30  Signal off time      Min 5.0  5.0    Magnet settings  Output current          mA 1.0  1.125  Pulse width         uSec 500  500  Signal on time         Sec 60  60    Autostimulation (AS)  Output Current          mA 0.875  1.0  Pulse width         uSec 500  500  Signal on time         Sec 30  30  Tachycardia detect  On  On      VNS was interrogated today (4/7/2022) and the following changes were made:     Model # AspireSR M106  Implant Date: 2/21/2019  Is Device palpable in chest wall                     Yes  Side of implant                                                L                                                             Prior settings:  Output current             mA        0.675               Signal Freq                 Hz        30                      Pulse width                 uSec    500               Signal on time             Sec      30                     Signal off time             Min      5.0                   Duty cycle 10%    Autostimulation (AS)  Output Current            mA       0.75                  Pulse width                 uSec    500                   Signal on time             Sec      30    Magnet settings  Output current             mA       0.75                  Pulse width                 uSec    500                    Signal on time             Sec      60                       Tachycardia detect     On/Off                Threshhold for AS       %                                      Lead impedence 3289 Ohms  Generator battery: 50-75 %     New settings:                                                         Output current             mA       0.75               Signal Freq                 Hz        30                      Pulse width                 uSec    500               Signal on time             Sec      60                     Signal off time             Min      5.0                   Duty cycle 18%    Autostimulation (AS)  Output Current            mA       0.875                  Pulse width                 uSec    500                   Signal on time             Sec      30    Magnet settings  Output current             mA       1.0                  Pulse width                 uSec    500                    Signal on time             Sec      60                       Tachycardia detect     On/Off                Threshhold for AS       %                                      Generator battery: 50-75%     PRIOR ANTICONVULSANT HISTORY:   carbamazepine (Tegretol, CBZ):        tried in the past  ethosuximide (Zarontin, ESM):           tried in the past  felbamate (felbatol, FBM):                  tried in the past  gabapentin (Neurontin, GPN):             tried in the past  levetiracetam (Keppra, LEV):             tried in the past  phenytoin (Dilantin, PHT):                  tried in the past  primidone (Mysoline, PRM):               tried in the past  topiramate (Topamax, TPM):             tried in the past  valproic acid (Depakote, VPA):          tried in the past   clorazepate (Tranxene, CLZ):            Stopped previous visits.       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    Environmental and seasonal allergies 09/13/2018    Epilepsy 02/21/2019    S/P placement of VNS (vagus nerve stimulation) device 03/27/2019              Resolved Ambulatory Problems     Diagnosis Date Noted    No Resolved Ambulatory Problems      No Additional Past Medical History      PAST PSYCHIATRIC HISTORY:  no     PAST SURGICAL HISTORY including Epilepsy surgery:             Past Surgical History:   Procedure Laterality Date    epilepsy surgery Left 2008    VAGUS NERVE STIMULATOR INSERTION Left 2/21/2019     Procedure: INSERTION, VAGUS NERVE STIMULATOR;  Surgeon: Teddy Saldana MD;  Location: [facility] OR [address] [place] [address];  Service: Neurosurgery;  Laterality: Left;  toronto II, asa 2, regular bed, supine,      FAMILY HISTORY:             Family History   Problem Relation Age of Onset    Diabetes Father      Cancer Father      Diabetes Sister      Cancer Paternal Aunt      Diabetes Paternal Uncle      Cancer Paternal Grandmother      Diabetes Paternal Grandfather      Cancer Paternal Grandfather         SOCIAL HISTORY:               Social History                   Socioeconomic History    Marital status: Single       Spouse name: Not on file    Number of children: Not on file    Years of education: Not on file    Highest education level: Not on file    Occupational History    Not on file   Tobacco Use    Smoking status: Never Smoker   Substance and Sexual Activity    Alcohol use: No    Drug use: No    Sexual activity: Not on file   Other Topics Concern    Not on file   Social History Narrative    Not on file      Social Determinants of Health            Financial Resource Strain:     Difficulty of Paying Living Expenses:    Food Insecurity:     Worried About Running Out of Food in the Last Year:     Ran Out of Food in the Last Year:    Transportation Needs:     Lack of Transportation (Medical):     Lack of Transportation (Non-Medical):    Physical Activity:     Days of Exercise per Week:     Minutes of Exercise per Session:    Stress:     Feeling of Stress :    Social Connections:     Frequency of Communication with Friends and Family:     Frequency of Social Gatherings with Friends and Family:     Attends Tenriism Services:     Active Member of Clubs or Organizations:     Attends Club or Organization Meetings:     Marital Status:             a) Marital status: Single                                                    b) Living situation: patient lives with parents  c) Employed/Unemployed/Other: Employed full time  - works in a [employer], [place]     DRIVING HISTORY:  Currently driving: No       LEVEL OF EDUCATION: Masters in Industrial education     SUBSTANCE USE: none     ALLERGIES: Patient has no known allergies.      REVIEW OF SYSTEMS:  Review of Systems     EXAM:   There were no vitals filed for this visit.    - General: Awake, cooperative, NAD.  - HEENT: NC/AT  - Neck: decreased range of motion  - Pulmonary: no increased WOB  - Cardiac: well perfused   - Abdomen: nondistended  - Extremities: no edema  - Skin: no rashes or lesions noted     NEURO EXAM:   - Mental Status: Awake, alert, oriented x 3. Attentive to examiner. Language is occasionally hesitant with intact repetition and comprehension. Normal prosody. There were no paraphasic  errors. Able to name both high and low frequency objects. Speech was not dysarthric. Able to follow both midline and appendicular commands. There was no evidence of apraxia or neglect.     - Cranial Nerves:  VFF. EOMI. No facial droop. Hearing intact to room voice. Tongue protrudes in midline and to either side with no evidence of atrophy or weakness.     - Motor: Normal bulk throughout. No pronator drift bilaterally. No adventitious movements such as tremor or asterixis noted.   - Sensory: No subjective deficits  - Coordination: No dysmetria on FNF  - Gait: remains seated     IMPRESSION:   Focal epilepsy, onset at age 8 years  intractable s/p epi surgery at [facility] in [place] (left temporal resection, 2008) and VNS (2019)  Speech fluency impaired   Feels irritable when overstimulated by people around him talking      Plan:   - continue lacosamide 200mg BID  - continue perampanel 6mg qhs  - continue clobazam 20/40  - continue lamotrigine 600mg BID    - levels/labs next visit   - VNS check next in-person visit  - continue working w/ speech therapy to improve communication deficits     - continue sertraline 200 mg daily   - follow up in 3-6 months or sooner with issues  - advised to reach out over the portal with any concerns     Patient and wife are comfortable with plan. All questions and concerns are addressed at this time.     Ashely Peoples PA-C   Neurology-Epilepsy  [facility]    Collaborating physician, Dr. Harvey Roberts, was available during today's encounter.     I spent approximately 30 minutes on the day of this encounter preparing to see the patient, obtaining and reviewing history and results, performing a medically appropriate exam, counseling and educating the patient/family/caregiver, documenting clinical information, coordinating care, and ordering medications, tests, procedures, and referrals.    The patient location is: Home  The chief complaint leading to consultation is:  Epilepsy  Visit type: Virtual visit with synchronous audio and video  Total time spent with patient: 11 minutes  Each patient to whom he or she provides medical services by telemedicine is:  (1) informed of the relationship between the physician and patient and the respective role of any other health care provider with respect to management of the patient; and (2) notified that he or she may decline to receive medical services by telemedicine and may withdraw from such care at any time.

## 2025-07-16 ENCOUNTER — TELEPHONE (OUTPATIENT)
Dept: NEUROLOGY | Facility: CLINIC | Age: 50
End: 2025-07-16
Payer: COMMERCIAL

## 2025-07-16 NOTE — TELEPHONE ENCOUNTER
Copied from CRM #[id number]. Topic: General Inquiry - Patient Advice  >> Jul 10, 2025 11:38 AM Jagruti wrote:  Pt states every 3-4 weeks he is having seizures. Would like to speak with physician. Would like to discuss issue and come in for an appt. Please call [phone]  >> Jul 15, 2025  7:55 PM Med Assistant Cecilia wrote:  Pt having multiple seizures and wants in-person appt. Can you please assist in getting pt scheduled a NP appt with Dr. Wray. Thank you.  ----- Message -----  From: Jagruti Nagel  Sent: 7/10/2025  11:41 AM CDT  To: [facility] Staff

## 2025-07-22 ENCOUNTER — TELEPHONE (OUTPATIENT)
Dept: NEUROLOGY | Facility: CLINIC | Age: 50
End: 2025-07-22
Payer: COMMERCIAL

## 2025-07-22 NOTE — TELEPHONE ENCOUNTER
Called patient to schedule np appt on 7/29 at 2pm with Dr. Cote. Patient verbalized agreement

## 2025-08-06 ENCOUNTER — OFFICE VISIT (OUTPATIENT)
Dept: NEUROLOGY | Facility: CLINIC | Age: 50
End: 2025-08-06
Payer: COMMERCIAL

## 2025-08-06 DIAGNOSIS — F07.0 TEMPORAL LOBECTOMY BEHAVIOR SYNDROME: ICD-10-CM

## 2025-08-06 DIAGNOSIS — G40.219 LOCALIZATION-RELATED SYMPTOMATIC EPILEPSY AND EPILEPTIC SYNDROMES WITH COMPLEX PARTIAL SEIZURES, INTRACTABLE, WITHOUT STATUS EPILEPTICUS: Primary | ICD-10-CM

## 2025-08-06 DIAGNOSIS — R41.3 MEMORY DIFFICULTIES: ICD-10-CM

## 2025-08-06 DIAGNOSIS — F06.30 MOOD DISORDER DUE TO MEDICAL CONDITION: ICD-10-CM

## 2025-08-06 DIAGNOSIS — Z96.89 S/P PLACEMENT OF VNS (VAGUS NERVE STIMULATION) DEVICE: ICD-10-CM

## 2025-08-06 DIAGNOSIS — F80.9 COMMUNICATION DEFICIT: ICD-10-CM

## 2025-08-20 DIAGNOSIS — G40.219 LOCALIZATION-RELATED SYMPTOMATIC EPILEPSY AND EPILEPTIC SYNDROMES WITH COMPLEX PARTIAL SEIZURES, INTRACTABLE, WITHOUT STATUS EPILEPTICUS: ICD-10-CM

## 2025-08-20 RX ORDER — LACOSAMIDE 200 MG/1
200 TABLET ORAL EVERY 12 HOURS
Qty: 180 TABLET | Refills: 1 | Status: SHIPPED | OUTPATIENT
Start: 2025-08-20

## 2025-08-20 RX ORDER — PERAMPANEL 6 MG/1
6 TABLET ORAL NIGHTLY
Qty: 90 TABLET | Refills: 1 | Status: SHIPPED | OUTPATIENT
Start: 2025-08-20

## 2025-08-20 RX ORDER — CLOBAZAM 20 MG/1
TABLET ORAL
Qty: 270 TABLET | Refills: 1 | Status: SHIPPED | OUTPATIENT
Start: 2025-08-20

## 2025-08-27 ENCOUNTER — PATIENT MESSAGE (OUTPATIENT)
Dept: NEUROLOGY | Facility: CLINIC | Age: 50
End: 2025-08-27
Payer: COMMERCIAL

## 2025-09-02 ENCOUNTER — TELEPHONE (OUTPATIENT)
Dept: NEUROLOGY | Facility: CLINIC | Age: 50
End: 2025-09-02
Payer: COMMERCIAL

## (undated) DEVICE — TUBE FRAZIER 5MM 2FT SOFT TIP

## (undated) DEVICE — DRESSING SURGICAL 1/2X1/2

## (undated) DEVICE — KIT SURGIFLO EVITHROM

## (undated) DEVICE — GAUZE SPONGE PEANUT STRL

## (undated) DEVICE — REMOVER LOTION

## (undated) DEVICE — DRESSING TRANS 2X2 TEGADERM

## (undated) DEVICE — STAPLER SKIN PROXIMATE WIDE

## (undated) DEVICE — SEE MEDLINE ITEM 156905

## (undated) DEVICE — CORD BIPOLAR 12 FOOT

## (undated) DEVICE — SEE MEDLINE ITEM 157150

## (undated) DEVICE — COVER PROBE NL STRL 3.6X96IN

## (undated) DEVICE — SEE MEDLINE ITEM 157131

## (undated) DEVICE — DRAPE OPMI STERILE

## (undated) DEVICE — DIFFUSER

## (undated) DEVICE — DURAPREP SURG SCRUB 26ML

## (undated) DEVICE — CLOSURE SKIN STERI STRIP 1/2X4

## (undated) DEVICE — ELECTRODE BLADE INSULATED 1 IN

## (undated) DEVICE — DRAPE THYROID WITH ARMBOARD

## (undated) DEVICE — SEE MEDLINE ITEM 153688

## (undated) DEVICE — KIT SURGIFLO HEMOSTATIC MATRIX

## (undated) DEVICE — DRESSING TRANS 4X4 TEGADERM

## (undated) DEVICE — DRAPE STERI INSTRUMENT 1018

## (undated) DEVICE — ADHESIVE MASTISOL VIAL 48/BX

## (undated) DEVICE — SUT VICRYL PLUS 3-0 SH 18IN

## (undated) DEVICE — SUT MCRYL PLUS 4-0 PS2 27IN

## (undated) DEVICE — TUNNELER

## (undated) DEVICE — TRAY FOLEY 16FR INFECTION CONT

## (undated) DEVICE — GAUZE SPONGE 4X4 12PLY

## (undated) DEVICE — SUT 4/0 18IN NUROLON BLK B

## (undated) DEVICE — DRAPE STERI-DRAPE 1000 17X11IN

## (undated) DEVICE — DRAPE INCISE IOBAN 2 23X17IN

## (undated) DEVICE — TEGADERM IV

## (undated) DEVICE — STRIP STERI REIN CLSR 1/2X2IN

## (undated) DEVICE — DRESSING ADH FILM TELFA 2X3IN

## (undated) DEVICE — MARKER SKIN STND TIP BLUE BARR

## (undated) DEVICE — CARTRIDGE OIL

## (undated) DEVICE — ELECTRODE REM PLYHSV RETURN 9